# Patient Record
Sex: MALE | Race: WHITE | NOT HISPANIC OR LATINO | Employment: OTHER | URBAN - NONMETROPOLITAN AREA
[De-identification: names, ages, dates, MRNs, and addresses within clinical notes are randomized per-mention and may not be internally consistent; named-entity substitution may affect disease eponyms.]

---

## 2017-02-10 ENCOUNTER — TELEPHONE (OUTPATIENT)
Dept: CARDIOLOGY | Facility: CLINIC | Age: 67
End: 2017-02-10

## 2017-02-10 NOTE — TELEPHONE ENCOUNTER
Called pt to see if he had his Lipid panel done. [facility] [place] and [facility] work.

## 2018-08-30 ENCOUNTER — HOSPITAL ENCOUNTER (OUTPATIENT)
Facility: HOSPITAL | Age: 68
Setting detail: OBSERVATION
Discharge: HOME | End: 2018-08-31
Attending: FAMILY MEDICINE | Admitting: INTERNAL MEDICINE

## 2018-08-30 ENCOUNTER — APPOINTMENT (OUTPATIENT)
Dept: GENERAL RADIOLOGY | Facility: HOSPITAL | Age: 68
End: 2018-08-30

## 2018-08-30 DIAGNOSIS — R07.9 CHEST PAIN, UNSPECIFIED TYPE: Primary | ICD-10-CM

## 2018-08-30 LAB
ALBUMIN SERPL-MCNC: 3.9 G/DL (ref 3.4–4.8)
ALBUMIN/GLOB SERPL: 1.6 G/DL (ref 1.5–2.5)
ALP SERPL-CCNC: 117 U/L (ref 40–129)
ALT SERPL W P-5'-P-CCNC: 14 U/L (ref 10–44)
ANION GAP SERPL CALCULATED.3IONS-SCNC: 2.7 MMOL/L (ref 3.6–11.2)
AST SERPL-CCNC: 12 U/L (ref 10–34)
BASOPHILS # BLD AUTO: 0.07 10*3/MM3 (ref 0–0.3)
BASOPHILS NFR BLD AUTO: 0.8 % (ref 0–2)
BILIRUB SERPL-MCNC: 0.7 MG/DL (ref 0.2–1.8)
BUN BLD-MCNC: 16 MG/DL (ref 7–21)
BUN/CREAT SERPL: 16.3 (ref 7–25)
CALCIUM SPEC-SCNC: 8.8 MG/DL (ref 7.7–10)
CHLORIDE SERPL-SCNC: 109 MMOL/L (ref 99–112)
CK MB SERPL-CCNC: 0.97 NG/ML (ref 0–5)
CK MB SERPL-RTO: 1.5 % (ref 0–3)
CK SERPL-CCNC: 64 U/L (ref 24–204)
CO2 SERPL-SCNC: 25.3 MMOL/L (ref 24.3–31.9)
CREAT BLD-MCNC: 0.98 MG/DL (ref 0.43–1.29)
D DIMER PPP FEU-MCNC: <0.27 MCGFEU/ML (ref 0–0.5)
DEPRECATED RDW RBC AUTO: 42.4 FL (ref 37–54)
EOSINOPHIL # BLD AUTO: 0.34 10*3/MM3 (ref 0–0.7)
EOSINOPHIL NFR BLD AUTO: 3.8 % (ref 0–7)
ERYTHROCYTE [DISTWIDTH] IN BLOOD BY AUTOMATED COUNT: 13.7 % (ref 11.5–14.5)
GFR SERPL CREATININE-BSD FRML MDRD: 76 ML/MIN/1.73
GLOBULIN UR ELPH-MCNC: 2.4 GM/DL
GLUCOSE BLD-MCNC: 92 MG/DL (ref 70–110)
HCT VFR BLD AUTO: 43 % (ref 42–52)
HGB BLD-MCNC: 15 G/DL (ref 14–18)
HOLD SPECIMEN: NORMAL
HOLD SPECIMEN: NORMAL
IMM GRANULOCYTES # BLD: 0.03 10*3/MM3 (ref 0–0.03)
IMM GRANULOCYTES NFR BLD: 0.3 % (ref 0–0.5)
INR PPP: 1.14 (ref 0.9–1.1)
LYMPHOCYTES # BLD AUTO: 2.98 10*3/MM3 (ref 1–3)
LYMPHOCYTES NFR BLD AUTO: 33 % (ref 16–46)
MCH RBC QN AUTO: 29.9 PG (ref 27–33)
MCHC RBC AUTO-ENTMCNC: 34.9 G/DL (ref 33–37)
MCV RBC AUTO: 85.8 FL (ref 80–94)
MONOCYTES # BLD AUTO: 0.77 10*3/MM3 (ref 0.1–0.9)
MONOCYTES NFR BLD AUTO: 8.5 % (ref 0–12)
MYOGLOBIN SERPL-MCNC: 77 NG/ML (ref 0–109)
NEUTROPHILS # BLD AUTO: 4.84 10*3/MM3 (ref 1.4–6.5)
NEUTROPHILS NFR BLD AUTO: 53.6 % (ref 40–75)
OSMOLALITY SERPL CALC.SUM OF ELEC: 274.6 MOSM/KG (ref 273–305)
PLATELET # BLD AUTO: 262 10*3/MM3 (ref 130–400)
PMV BLD AUTO: 10 FL (ref 6–10)
POTASSIUM BLD-SCNC: 4 MMOL/L (ref 3.5–5.3)
PROT SERPL-MCNC: 6.3 G/DL (ref 6–8)
PROTHROMBIN TIME: 14.8 SECONDS (ref 11–15.4)
RBC # BLD AUTO: 5.01 10*6/MM3 (ref 4.7–6.1)
SODIUM BLD-SCNC: 137 MMOL/L (ref 135–153)
TROPONIN I SERPL-MCNC: <0.006 NG/ML
TROPONIN I SERPL-MCNC: <0.006 NG/ML
WBC NRBC COR # BLD: 9.03 10*3/MM3 (ref 4.5–12.5)
WHOLE BLOOD HOLD SPECIMEN: NORMAL
WHOLE BLOOD HOLD SPECIMEN: NORMAL

## 2018-08-30 PROCEDURE — 71045 X-RAY EXAM CHEST 1 VIEW: CPT | Performed by: RADIOLOGY

## 2018-08-30 PROCEDURE — 99285 EMERGENCY DEPT VISIT HI MDM: CPT

## 2018-08-30 PROCEDURE — 25010000002 HEPARIN (PORCINE) PER 1000 UNITS: Performed by: INTERNAL MEDICINE

## 2018-08-30 PROCEDURE — G0378 HOSPITAL OBSERVATION PER HR: HCPCS

## 2018-08-30 PROCEDURE — 99220 PR INITIAL OBSERVATION CARE/DAY 70 MINUTES: CPT | Performed by: INTERNAL MEDICINE

## 2018-08-30 PROCEDURE — 93005 ELECTROCARDIOGRAM TRACING: CPT | Performed by: PHYSICIAN ASSISTANT

## 2018-08-30 PROCEDURE — 71045 X-RAY EXAM CHEST 1 VIEW: CPT

## 2018-08-30 PROCEDURE — 80053 COMPREHEN METABOLIC PANEL: CPT | Performed by: PHYSICIAN ASSISTANT

## 2018-08-30 PROCEDURE — 93010 ELECTROCARDIOGRAM REPORT: CPT | Performed by: INTERNAL MEDICINE

## 2018-08-30 PROCEDURE — 82550 ASSAY OF CK (CPK): CPT | Performed by: INTERNAL MEDICINE

## 2018-08-30 PROCEDURE — 85025 COMPLETE CBC W/AUTO DIFF WBC: CPT | Performed by: PHYSICIAN ASSISTANT

## 2018-08-30 PROCEDURE — 83874 ASSAY OF MYOGLOBIN: CPT | Performed by: INTERNAL MEDICINE

## 2018-08-30 PROCEDURE — 84484 ASSAY OF TROPONIN QUANT: CPT | Performed by: PHYSICIAN ASSISTANT

## 2018-08-30 PROCEDURE — 96372 THER/PROPH/DIAG INJ SC/IM: CPT

## 2018-08-30 PROCEDURE — 84484 ASSAY OF TROPONIN QUANT: CPT | Performed by: INTERNAL MEDICINE

## 2018-08-30 PROCEDURE — 82553 CREATINE MB FRACTION: CPT | Performed by: INTERNAL MEDICINE

## 2018-08-30 PROCEDURE — 85379 FIBRIN DEGRADATION QUANT: CPT | Performed by: PHYSICIAN ASSISTANT

## 2018-08-30 PROCEDURE — 85610 PROTHROMBIN TIME: CPT | Performed by: PHYSICIAN ASSISTANT

## 2018-08-30 RX ORDER — ASPIRIN 81 MG/1
324 TABLET, CHEWABLE ORAL ONCE
Status: COMPLETED | OUTPATIENT
Start: 2018-08-30 | End: 2018-08-30

## 2018-08-30 RX ORDER — SODIUM CHLORIDE 0.9 % (FLUSH) 0.9 %
10 SYRINGE (ML) INJECTION AS NEEDED
Status: DISCONTINUED | OUTPATIENT
Start: 2018-08-30 | End: 2018-08-31 | Stop reason: HOSPADM

## 2018-08-30 RX ORDER — SODIUM CHLORIDE 0.9 % (FLUSH) 0.9 %
1-10 SYRINGE (ML) INJECTION AS NEEDED
Status: DISCONTINUED | OUTPATIENT
Start: 2018-08-30 | End: 2018-08-31 | Stop reason: HOSPADM

## 2018-08-30 RX ORDER — PANTOPRAZOLE SODIUM 40 MG/1
40 TABLET, DELAYED RELEASE ORAL
Status: DISCONTINUED | OUTPATIENT
Start: 2018-08-31 | End: 2018-08-31 | Stop reason: HOSPADM

## 2018-08-30 RX ORDER — ATORVASTATIN CALCIUM 20 MG/1
20 TABLET, FILM COATED ORAL NIGHTLY
Status: DISCONTINUED | OUTPATIENT
Start: 2018-08-31 | End: 2018-08-31 | Stop reason: HOSPADM

## 2018-08-30 RX ORDER — NITROGLYCERIN 0.4 MG/1
0.4 TABLET SUBLINGUAL
Status: DISCONTINUED | OUTPATIENT
Start: 2018-08-30 | End: 2018-08-31 | Stop reason: HOSPADM

## 2018-08-30 RX ORDER — HEPARIN SODIUM 5000 [USP'U]/ML
5000 INJECTION, SOLUTION INTRAVENOUS; SUBCUTANEOUS EVERY 12 HOURS SCHEDULED
Status: DISCONTINUED | OUTPATIENT
Start: 2018-08-30 | End: 2018-08-31 | Stop reason: HOSPADM

## 2018-08-30 RX ORDER — ASPIRIN 81 MG/1
81 TABLET ORAL DAILY
Status: DISCONTINUED | OUTPATIENT
Start: 2018-08-31 | End: 2018-08-31 | Stop reason: HOSPADM

## 2018-08-30 RX ORDER — MORPHINE SULFATE 15 MG/1
15 TABLET, FILM COATED, EXTENDED RELEASE ORAL EVERY 8 HOURS SCHEDULED
Status: DISCONTINUED | OUTPATIENT
Start: 2018-08-31 | End: 2018-08-31 | Stop reason: HOSPADM

## 2018-08-30 RX ORDER — IPRATROPIUM BROMIDE AND ALBUTEROL SULFATE 2.5; .5 MG/3ML; MG/3ML
3 SOLUTION RESPIRATORY (INHALATION) EVERY 6 HOURS PRN
Status: DISCONTINUED | OUTPATIENT
Start: 2018-08-30 | End: 2018-08-31 | Stop reason: HOSPADM

## 2018-08-30 RX ADMIN — ASPIRIN 324 MG: 81 TABLET, CHEWABLE ORAL at 18:14

## 2018-08-30 RX ADMIN — NITROGLYCERIN 1 INCH: 20 OINTMENT TOPICAL at 18:15

## 2018-08-30 RX ADMIN — HEPARIN SODIUM 5000 UNITS: 5000 INJECTION, SOLUTION INTRAVENOUS; SUBCUTANEOUS at 23:49

## 2018-08-31 ENCOUNTER — APPOINTMENT (OUTPATIENT)
Dept: CARDIOLOGY | Facility: HOSPITAL | Age: 68
End: 2018-08-31

## 2018-08-31 ENCOUNTER — APPOINTMENT (OUTPATIENT)
Dept: NUCLEAR MEDICINE | Facility: HOSPITAL | Age: 68
End: 2018-08-31

## 2018-08-31 VITALS
WEIGHT: 195.2 LBS | BODY MASS INDEX: 27.33 KG/M2 | TEMPERATURE: 98.1 F | HEIGHT: 71 IN | DIASTOLIC BLOOD PRESSURE: 75 MMHG | HEART RATE: 50 BPM | OXYGEN SATURATION: 99 % | RESPIRATION RATE: 18 BRPM | SYSTOLIC BLOOD PRESSURE: 136 MMHG

## 2018-08-31 LAB
6-ACETYL MORPHINE: NEGATIVE
ALBUMIN SERPL-MCNC: 3.3 G/DL (ref 3.4–4.8)
ALBUMIN/GLOB SERPL: 1.5 G/DL (ref 1.5–2.5)
ALP SERPL-CCNC: 102 U/L (ref 40–129)
ALT SERPL W P-5'-P-CCNC: 10 U/L (ref 10–44)
AMPHET+METHAMPHET UR QL: NEGATIVE
ANION GAP SERPL CALCULATED.3IONS-SCNC: 0.7 MMOL/L (ref 3.6–11.2)
AST SERPL-CCNC: 12 U/L (ref 10–34)
BARBITURATES UR QL SCN: NEGATIVE
BASOPHILS # BLD AUTO: 0.05 10*3/MM3 (ref 0–0.3)
BASOPHILS NFR BLD AUTO: 0.7 % (ref 0–2)
BENZODIAZ UR QL SCN: NEGATIVE
BH CV ECHO MEAS - % IVS THICK: 47.2 %
BH CV ECHO MEAS - % LVPW THICK: 50.9 %
BH CV ECHO MEAS - ACS: 2 CM
BH CV ECHO MEAS - AO MAX PG: 7.3 MMHG
BH CV ECHO MEAS - AO MEAN PG: 5 MMHG
BH CV ECHO MEAS - AO ROOT AREA (BSA CORRECTED): 1.6
BH CV ECHO MEAS - AO ROOT AREA: 9.1 CM^2
BH CV ECHO MEAS - AO ROOT DIAM: 3.4 CM
BH CV ECHO MEAS - AO V2 MAX: 134.7 CM/SEC
BH CV ECHO MEAS - AO V2 MEAN: 104.7 CM/SEC
BH CV ECHO MEAS - AO V2 VTI: 28.4 CM
BH CV ECHO MEAS - BSA(HAYCOCK): 2.1 M^2
BH CV ECHO MEAS - BSA: 2.1 M^2
BH CV ECHO MEAS - BZI_BMI: 27.2 KILOGRAMS/M^2
BH CV ECHO MEAS - BZI_METRIC_HEIGHT: 180.3 CM
BH CV ECHO MEAS - BZI_METRIC_WEIGHT: 88.5 KG
BH CV ECHO MEAS - EDV(CUBED): 133.1 ML
BH CV ECHO MEAS - EDV(MOD-SP4): 72 ML
BH CV ECHO MEAS - EDV(TEICH): 124.2 ML
BH CV ECHO MEAS - EF(CUBED): 64.4 %
BH CV ECHO MEAS - EF(MOD-SP4): 68.1 %
BH CV ECHO MEAS - EF(TEICH): 55.6 %
BH CV ECHO MEAS - ESV(CUBED): 47.5 ML
BH CV ECHO MEAS - ESV(MOD-SP4): 23 ML
BH CV ECHO MEAS - ESV(TEICH): 55.2 ML
BH CV ECHO MEAS - FS: 29.1 %
BH CV ECHO MEAS - IVS/LVPW: 0.85
BH CV ECHO MEAS - IVSD: 0.86 CM
BH CV ECHO MEAS - IVSS: 1.3 CM
BH CV ECHO MEAS - LA DIMENSION: 4 CM
BH CV ECHO MEAS - LA/AO: 1.2
BH CV ECHO MEAS - LV DIASTOLIC VOL/BSA (35-75): 34.5 ML/M^2
BH CV ECHO MEAS - LV MASS(C)D: 171.4 GRAMS
BH CV ECHO MEAS - LV MASS(C)DI: 82.1 GRAMS/M^2
BH CV ECHO MEAS - LV MASS(C)S: 179.1 GRAMS
BH CV ECHO MEAS - LV MASS(C)SI: 85.9 GRAMS/M^2
BH CV ECHO MEAS - LV SYSTOLIC VOL/BSA (12-30): 11 ML/M^2
BH CV ECHO MEAS - LVIDD: 5.1 CM
BH CV ECHO MEAS - LVIDS: 3.6 CM
BH CV ECHO MEAS - LVLD AP4: 7.2 CM
BH CV ECHO MEAS - LVLS AP4: 6.5 CM
BH CV ECHO MEAS - LVOT AREA (M): 3.5 CM^2
BH CV ECHO MEAS - LVOT AREA: 3.6 CM^2
BH CV ECHO MEAS - LVOT DIAM: 2.1 CM
BH CV ECHO MEAS - LVPWD: 1 CM
BH CV ECHO MEAS - LVPWS: 1.5 CM
BH CV ECHO MEAS - MV A MAX VEL: 88.8 CM/SEC
BH CV ECHO MEAS - MV E MAX VEL: 51.8 CM/SEC
BH CV ECHO MEAS - MV E/A: 0.58
BH CV ECHO MEAS - RAP SYSTOLE: 10 MMHG
BH CV ECHO MEAS - RVDD: 1.8 CM
BH CV ECHO MEAS - RVSP: 35.8 MMHG
BH CV ECHO MEAS - SI(AO): 124.5 ML/M^2
BH CV ECHO MEAS - SI(CUBED): 41.1 ML/M^2
BH CV ECHO MEAS - SI(MOD-SP4): 23.5 ML/M^2
BH CV ECHO MEAS - SI(TEICH): 33.1 ML/M^2
BH CV ECHO MEAS - SV(AO): 259.7 ML
BH CV ECHO MEAS - SV(CUBED): 85.7 ML
BH CV ECHO MEAS - SV(MOD-SP4): 49 ML
BH CV ECHO MEAS - SV(TEICH): 69 ML
BH CV ECHO MEAS - TR MAX VEL: 253.8 CM/SEC
BH CV NUCLEAR PRIOR STUDY: 3
BH CV STRESS BP STAGE 1: NORMAL
BH CV STRESS BP STAGE 2: NORMAL
BH CV STRESS COMMENTS STAGE 1: NORMAL
BH CV STRESS COMMENTS STAGE 2: NORMAL
BH CV STRESS DOSE REGADENOSON STAGE 1: 0.4
BH CV STRESS DURATION MIN STAGE 1: 0
BH CV STRESS DURATION MIN STAGE 2: 4
BH CV STRESS DURATION SEC STAGE 1: 10
BH CV STRESS DURATION SEC STAGE 2: 0
BH CV STRESS HR STAGE 1: 85
BH CV STRESS HR STAGE 2: 90
BH CV STRESS PROTOCOL 1: NORMAL
BH CV STRESS RECOVERY BP: NORMAL MMHG
BH CV STRESS RECOVERY HR: 71 BPM
BH CV STRESS STAGE 1: 1
BH CV STRESS STAGE 2: 2
BILIRUB SERPL-MCNC: 0.6 MG/DL (ref 0.2–1.8)
BUN BLD-MCNC: 16 MG/DL (ref 7–21)
BUN/CREAT SERPL: 16.3 (ref 7–25)
BUPRENORPHINE SERPL-MCNC: NEGATIVE NG/ML
CALCIUM SPEC-SCNC: 8.4 MG/DL (ref 7.7–10)
CANNABINOIDS SERPL QL: NEGATIVE
CHLORIDE SERPL-SCNC: 111 MMOL/L (ref 99–112)
CHOLEST SERPL-MCNC: 98 MG/DL (ref 0–200)
CK MB SERPL-CCNC: 1.26 NG/ML (ref 0–5)
CK MB SERPL-CCNC: 1.57 NG/ML (ref 0–5)
CK MB SERPL-RTO: 2.1 % (ref 0–3)
CK MB SERPL-RTO: 2.2 % (ref 0–3)
CK SERPL-CCNC: 60 U/L (ref 24–204)
CK SERPL-CCNC: 70 U/L (ref 24–204)
CO2 SERPL-SCNC: 26.3 MMOL/L (ref 24.3–31.9)
COCAINE UR QL: NEGATIVE
CREAT BLD-MCNC: 0.98 MG/DL (ref 0.43–1.29)
DEPRECATED RDW RBC AUTO: 43.5 FL (ref 37–54)
EOSINOPHIL # BLD AUTO: 0.35 10*3/MM3 (ref 0–0.7)
EOSINOPHIL NFR BLD AUTO: 4.7 % (ref 0–7)
ERYTHROCYTE [DISTWIDTH] IN BLOOD BY AUTOMATED COUNT: 13.8 % (ref 11.5–14.5)
GFR SERPL CREATININE-BSD FRML MDRD: 76 ML/MIN/1.73
GLOBULIN UR ELPH-MCNC: 2.2 GM/DL
GLUCOSE BLD-MCNC: 99 MG/DL (ref 70–110)
HBA1C MFR BLD: 5.5 % (ref 4.5–5.7)
HCT VFR BLD AUTO: 40.9 % (ref 42–52)
HDLC SERPL-MCNC: 30 MG/DL (ref 60–100)
HGB BLD-MCNC: 14 G/DL (ref 14–18)
IMM GRANULOCYTES # BLD: 0.04 10*3/MM3 (ref 0–0.03)
IMM GRANULOCYTES NFR BLD: 0.5 % (ref 0–0.5)
LDLC SERPL CALC-MCNC: 48 MG/DL (ref 0–100)
LDLC/HDLC SERPL: 1.59 {RATIO}
LV EF 2D ECHO EST: 65 %
LV EF NUC BP: 70 %
LYMPHOCYTES # BLD AUTO: 2.28 10*3/MM3 (ref 1–3)
LYMPHOCYTES NFR BLD AUTO: 30.5 % (ref 16–46)
MAXIMAL PREDICTED HEART RATE: 153 BPM
MAXIMAL PREDICTED HEART RATE: 153 BPM
MCH RBC QN AUTO: 29.8 PG (ref 27–33)
MCHC RBC AUTO-ENTMCNC: 34.2 G/DL (ref 33–37)
MCV RBC AUTO: 87 FL (ref 80–94)
METHADONE UR QL SCN: NEGATIVE
MONOCYTES # BLD AUTO: 0.77 10*3/MM3 (ref 0.1–0.9)
MONOCYTES NFR BLD AUTO: 10.3 % (ref 0–12)
MYOGLOBIN SERPL-MCNC: 69 NG/ML (ref 0–109)
MYOGLOBIN SERPL-MCNC: 79 NG/ML (ref 0–109)
NEUTROPHILS # BLD AUTO: 3.99 10*3/MM3 (ref 1.4–6.5)
NEUTROPHILS NFR BLD AUTO: 53.3 % (ref 40–75)
OPIATES UR QL: POSITIVE
OSMOLALITY SERPL CALC.SUM OF ELEC: 276.9 MOSM/KG (ref 273–305)
OXYCODONE UR QL SCN: NEGATIVE
PCP UR QL SCN: NEGATIVE
PERCENT MAX PREDICTED HR: 58.82 %
PLATELET # BLD AUTO: 236 10*3/MM3 (ref 130–400)
PMV BLD AUTO: 9.9 FL (ref 6–10)
POTASSIUM BLD-SCNC: 3.9 MMOL/L (ref 3.5–5.3)
PROT SERPL-MCNC: 5.5 G/DL (ref 6–8)
RBC # BLD AUTO: 4.7 10*6/MM3 (ref 4.7–6.1)
SODIUM BLD-SCNC: 138 MMOL/L (ref 135–153)
STRESS BASELINE BP: NORMAL MMHG
STRESS BASELINE HR: 51 BPM
STRESS PERCENT HR: 69 %
STRESS POST PEAK BP: NORMAL MMHG
STRESS POST PEAK HR: 90 BPM
STRESS TARGET HR: 130 BPM
STRESS TARGET HR: 130 BPM
TRIGL SERPL-MCNC: 101 MG/DL (ref 0–150)
TROPONIN I SERPL-MCNC: <0.006 NG/ML
TROPONIN I SERPL-MCNC: <0.006 NG/ML
VLDLC SERPL-MCNC: 20.2 MG/DL
WBC NRBC COR # BLD: 7.48 10*3/MM3 (ref 4.5–12.5)

## 2018-08-31 PROCEDURE — 80307 DRUG TEST PRSMV CHEM ANLYZR: CPT | Performed by: INTERNAL MEDICINE

## 2018-08-31 PROCEDURE — 83874 ASSAY OF MYOGLOBIN: CPT | Performed by: INTERNAL MEDICINE

## 2018-08-31 PROCEDURE — 83036 HEMOGLOBIN GLYCOSYLATED A1C: CPT | Performed by: INTERNAL MEDICINE

## 2018-08-31 PROCEDURE — A9500 TC99M SESTAMIBI: HCPCS | Performed by: INTERNAL MEDICINE

## 2018-08-31 PROCEDURE — 80053 COMPREHEN METABOLIC PANEL: CPT | Performed by: INTERNAL MEDICINE

## 2018-08-31 PROCEDURE — 84484 ASSAY OF TROPONIN QUANT: CPT | Performed by: INTERNAL MEDICINE

## 2018-08-31 PROCEDURE — 94799 UNLISTED PULMONARY SVC/PX: CPT

## 2018-08-31 PROCEDURE — 25010000002 REGADENOSON 0.4 MG/5ML SOLUTION: Performed by: INTERNAL MEDICINE

## 2018-08-31 PROCEDURE — G0378 HOSPITAL OBSERVATION PER HR: HCPCS

## 2018-08-31 PROCEDURE — 0 TECHNETIUM SESTAMIBI: Performed by: INTERNAL MEDICINE

## 2018-08-31 PROCEDURE — 93017 CV STRESS TEST TRACING ONLY: CPT

## 2018-08-31 PROCEDURE — 94640 AIRWAY INHALATION TREATMENT: CPT

## 2018-08-31 PROCEDURE — 80061 LIPID PANEL: CPT | Performed by: INTERNAL MEDICINE

## 2018-08-31 PROCEDURE — 82550 ASSAY OF CK (CPK): CPT | Performed by: INTERNAL MEDICINE

## 2018-08-31 PROCEDURE — 85025 COMPLETE CBC W/AUTO DIFF WBC: CPT | Performed by: PHYSICIAN ASSISTANT

## 2018-08-31 PROCEDURE — 93018 CV STRESS TEST I&R ONLY: CPT | Performed by: INTERNAL MEDICINE

## 2018-08-31 PROCEDURE — 93306 TTE W/DOPPLER COMPLETE: CPT | Performed by: INTERNAL MEDICINE

## 2018-08-31 PROCEDURE — 82553 CREATINE MB FRACTION: CPT | Performed by: INTERNAL MEDICINE

## 2018-08-31 PROCEDURE — 78452 HT MUSCLE IMAGE SPECT MULT: CPT

## 2018-08-31 PROCEDURE — 99217 PR OBSERVATION CARE DISCHARGE MANAGEMENT: CPT | Performed by: INTERNAL MEDICINE

## 2018-08-31 PROCEDURE — 93306 TTE W/DOPPLER COMPLETE: CPT

## 2018-08-31 PROCEDURE — 78452 HT MUSCLE IMAGE SPECT MULT: CPT | Performed by: INTERNAL MEDICINE

## 2018-08-31 RX ORDER — CLOBETASOL PROPIONATE 0.5 MG/G
1 CREAM TOPICAL 2 TIMES DAILY PRN
Status: ON HOLD | COMMUNITY
End: 2022-11-30

## 2018-08-31 RX ORDER — ETODOLAC 200 MG/1
200 CAPSULE ORAL 3 TIMES DAILY
COMMUNITY
End: 2022-04-11 | Stop reason: ALTCHOICE

## 2018-08-31 RX ORDER — ASPIRIN 81 MG/1
162 TABLET ORAL DAILY
COMMUNITY
End: 2018-08-31

## 2018-08-31 RX ORDER — METOPROLOL TARTRATE 50 MG/1
25 TABLET, FILM COATED ORAL 2 TIMES DAILY
COMMUNITY
End: 2018-08-31

## 2018-08-31 RX ORDER — ASPIRIN 81 MG/1
81 TABLET ORAL DAILY
Qty: 30 TABLET | Refills: 0 | Status: ON HOLD
Start: 2018-09-01 | End: 2022-11-30

## 2018-08-31 RX ORDER — LANOLIN ALCOHOL/MO/W.PET/CERES
1000 CREAM (GRAM) TOPICAL DAILY
COMMUNITY
End: 2022-04-11 | Stop reason: ALTCHOICE

## 2018-08-31 RX ORDER — ALBUTEROL SULFATE 90 UG/1
2 AEROSOL, METERED RESPIRATORY (INHALATION) EVERY 4 HOURS PRN
COMMUNITY
End: 2023-01-10 | Stop reason: SDUPTHER

## 2018-08-31 RX ORDER — BUDESONIDE AND FORMOTEROL FUMARATE DIHYDRATE 160; 4.5 UG/1; UG/1
2 AEROSOL RESPIRATORY (INHALATION)
Status: ON HOLD | COMMUNITY
End: 2022-11-30

## 2018-08-31 RX ORDER — RANITIDINE 300 MG/1
300 TABLET ORAL DAILY
COMMUNITY
End: 2022-04-11

## 2018-08-31 RX ORDER — CETIRIZINE HYDROCHLORIDE 10 MG/1
10 TABLET ORAL DAILY
Status: ON HOLD | COMMUNITY
End: 2022-11-30

## 2018-08-31 RX ORDER — SILDENAFIL 100 MG/1
100 TABLET, FILM COATED ORAL DAILY PRN
COMMUNITY
End: 2018-08-31

## 2018-08-31 RX ORDER — BETAMETHASONE DIPROPIONATE 0.05 %
1 OINTMENT (GRAM) TOPICAL DAILY
Status: ON HOLD | COMMUNITY
End: 2022-11-30

## 2018-08-31 RX ADMIN — METOPROLOL TARTRATE 12.5 MG: 25 TABLET, FILM COATED ORAL at 00:50

## 2018-08-31 RX ADMIN — MORPHINE SULFATE 15 MG: 15 TABLET, EXTENDED RELEASE ORAL at 00:49

## 2018-08-31 RX ADMIN — TECHNETIUM TC 99M SESTAMIBI 1 DOSE: 1 INJECTION INTRAVENOUS at 09:45

## 2018-08-31 RX ADMIN — IPRATROPIUM BROMIDE AND ALBUTEROL SULFATE 3 ML: .5; 3 SOLUTION RESPIRATORY (INHALATION) at 06:26

## 2018-08-31 RX ADMIN — ATORVASTATIN CALCIUM 20 MG: 20 TABLET, FILM COATED ORAL at 00:49

## 2018-08-31 RX ADMIN — MORPHINE SULFATE 15 MG: 15 TABLET, EXTENDED RELEASE ORAL at 05:12

## 2018-08-31 RX ADMIN — REGADENOSON 0.4 MG: 0.08 INJECTION, SOLUTION INTRAVENOUS at 09:45

## 2018-08-31 RX ADMIN — IPRATROPIUM BROMIDE AND ALBUTEROL SULFATE 3 ML: .5; 3 SOLUTION RESPIRATORY (INHALATION) at 02:58

## 2018-08-31 RX ADMIN — PANTOPRAZOLE SODIUM 40 MG: 40 TABLET, DELAYED RELEASE ORAL at 05:12

## 2018-09-04 ENCOUNTER — OFFICE VISIT (OUTPATIENT)
Dept: PULMONOLOGY | Facility: CLINIC | Age: 68
End: 2018-09-04

## 2018-09-04 VITALS
HEIGHT: 71 IN | TEMPERATURE: 97.5 F | WEIGHT: 192 LBS | SYSTOLIC BLOOD PRESSURE: 126 MMHG | DIASTOLIC BLOOD PRESSURE: 79 MMHG | HEART RATE: 57 BPM | BODY MASS INDEX: 26.88 KG/M2 | OXYGEN SATURATION: 95 %

## 2018-09-04 DIAGNOSIS — J41.0 SIMPLE CHRONIC BRONCHITIS (HCC): ICD-10-CM

## 2018-09-04 DIAGNOSIS — F51.5 DREAM ANXIETY DISORDER: ICD-10-CM

## 2018-09-04 DIAGNOSIS — G47.33 OSA (OBSTRUCTIVE SLEEP APNEA): Primary | ICD-10-CM

## 2018-09-04 DIAGNOSIS — F51.5 BAD DREAMS: ICD-10-CM

## 2018-09-04 PROCEDURE — 99204 OFFICE O/P NEW MOD 45 MIN: CPT | Performed by: INTERNAL MEDICINE
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
## 2018-09-05 ENCOUNTER — OFFICE VISIT (OUTPATIENT)
Dept: CARDIOLOGY | Facility: CLINIC | Age: 68
End: 2018-09-05

## 2018-09-05 VITALS
SYSTOLIC BLOOD PRESSURE: 127 MMHG | BODY MASS INDEX: 27.02 KG/M2 | DIASTOLIC BLOOD PRESSURE: 67 MMHG | HEART RATE: 50 BPM | RESPIRATION RATE: 16 BRPM | HEIGHT: 71 IN | OXYGEN SATURATION: 94 % | WEIGHT: 193 LBS

## 2018-09-05 DIAGNOSIS — I10 ESSENTIAL HYPERTENSION: ICD-10-CM

## 2018-09-05 DIAGNOSIS — I73.9 PVD (PERIPHERAL VASCULAR DISEASE) (HCC): ICD-10-CM

## 2018-09-05 DIAGNOSIS — R00.1 SINUS BRADYCARDIA: ICD-10-CM

## 2018-09-05 DIAGNOSIS — I25.10 CHRONIC CORONARY ARTERY DISEASE: ICD-10-CM

## 2018-09-05 DIAGNOSIS — E78.5 DYSLIPIDEMIA: ICD-10-CM

## 2018-09-05 DIAGNOSIS — Z72.0 TOBACCO ABUSE: ICD-10-CM

## 2018-09-05 DIAGNOSIS — R07.2 PRECORDIAL CHEST PAIN: Primary | ICD-10-CM

## 2018-09-05 PROCEDURE — 99214 OFFICE O/P EST MOD 30 MIN: CPT | Performed by: INTERNAL MEDICINE

## 2018-09-05 RX ORDER — ISOSORBIDE MONONITRATE 30 MG/1
30 TABLET, EXTENDED RELEASE ORAL DAILY
Qty: 30 TABLET | Refills: 6 | Status: SHIPPED | OUTPATIENT
Start: 2018-09-05 | End: 2018-10-23 | Stop reason: SDUPTHER

## 2018-09-05 NOTE — PROGRESS NOTES
Crispin Etienne MD  Bryson Baker  1950      Patient Active Problem List   Diagnosis   • Hypertension, controlled.   • Dyslipidemia, on Lipitor   • Pulmonary emphysema (CMS/HCC)   • Hypercholesterolemia   • Chronic coronary artery disease   • PVD (peripheral vascular disease) (CMS/HCC)   • Precordial chest pain   • Chest pain   • Tobacco abuse       Dear Crispin Etienne MD:    Subjective     Bryson Baker is a 68 y.o. male with the above medical problems who is here today for follow-up.  The patient was previously seen 2016 but it was lost to follow-up since that time.  He was recently admitted to the hospital for an episode of chest pain and was referred back to the clinic for further evaluation.  He underwent stress echo which showed no evidence of ischemia or previous infarct and inferior wall.  Systolic cardiac showed normal ejection fraction and wall motion abnormalities.  Currently the patient he is also presenting with bilateral lower extremity pain with ambulation concerning for claudication.  He has a known history of peripheral vascular disease.      Current Outpatient Prescriptions:   •  albuterol (PROVENTIL HFA;VENTOLIN HFA) 108 (90 Base) MCG/ACT inhaler, Inhale 2 puffs Every 4 (Four) Hours As Needed for Wheezing., Disp: , Rfl:   •  aspirin 81 MG EC tablet, Take 1 tablet by mouth Daily., Disp: 30 tablet, Rfl: 0  •  atorvastatin (LIPITOR) 20 MG tablet, Take 10 mg by mouth Daily., Disp: , Rfl:   •  betamethasone dipropionate (DIPROLENE) 0.05 % ointment, Apply 1 application topically to the appropriate area as directed Daily. Prior to [facility] Admission, Patient was on: Applies to legs for rash, Disp: , Rfl:   •  bisacodyl (DULCOLAX) 5 MG EC tablet, Take 10 mg by mouth 2 (Two) Times a Day., Disp: , Rfl:   •  budesonide-formoterol (SYMBICORT) 160-4.5 MCG/ACT inhaler, Inhale 2 puffs 2 (Two) Times a Day., Disp: , Rfl:   •  carbidopa-levodopa CR (SINEMET CR)  MG per CR tablet, Take 1  tablet by mouth Daily., Disp: , Rfl:   •  cetirizine (zyrTEC) 10 MG tablet, Take 10 mg by mouth Daily., Disp: , Rfl:   •  clobetasol (TEMOVATE) 0.05 % cream, Apply 1 application topically to the appropriate area as directed 2 (Two) Times a Day As Needed (rash). Prior to [facility] Admission, Patient was on: applies to legs, Disp: , Rfl:   •  etodolac (LODINE) 200 MG capsule, Take 200 mg by mouth 3 (Three) Times a Day., Disp: , Rfl:   •  metoprolol tartrate (LOPRESSOR) 25 MG tablet, Take ½ tablet by mouth Every 12 (Twelve) Hours., Disp: 30 tablet, Rfl: 0  •  morphine (MSIR) 15 MG tablet, Take 15 mg by mouth 3 (Three) Times a Day., Disp: , Rfl:   •  nitroglycerin (NITROSTAT) 0.4 MG SL tablet, Place 0.4 mg under the tongue Every 5 (Five) Minutes As Needed., Disp: , Rfl:   •  omeprazole (priLOSEC) 20 MG capsule, Take 20 mg by mouth 2 (Two) Times a Day As Needed., Disp: , Rfl:   •  raNITIdine (ZANTAC) 300 MG tablet, Take 300 mg by mouth Daily., Disp: , Rfl:   •  tamsulosin (FLOMAX) 0.4 MG capsule 24 hr capsule, Take 1 capsule by mouth Every Night., Disp: , Rfl:   •  vitamin B-12 (CYANOCOBALAMIN) 1000 MCG tablet, Take 1,000 mcg by mouth Daily., Disp: , Rfl:   •  isosorbide mononitrate (IMDUR) 30 MG 24 hr tablet, Take 1 tablet by mouth Daily., Disp: 30 tablet, Rfl: 6  •  lisinopril (PRINIVIL,ZESTRIL) 40 MG tablet, Take 20 mg by mouth Daily., Disp: , Rfl:     The following portions of the patient's history were reviewed and updated as appropriate: allergies, current medications, past family history, past medical history, past social history, past surgical history and problem list.    Review of Systems   Constitution: Negative for chills, diaphoresis and fever.   HENT: Negative for congestion, ear pain and sore throat.    Cardiovascular: Positive for chest pain and claudication. Negative for leg swelling, orthopnea, palpitations, paroxysmal nocturnal dyspnea and syncope.   Respiratory: Positive for shortness of breath. Negative  "for cough and hemoptysis.    Endocrine: Negative for cold intolerance and heat intolerance.   Hematologic/Lymphatic: Does not bruise/bleed easily.   Skin: Negative for rash.   Musculoskeletal: Negative for arthritis, joint pain, joint swelling, myalgias and stiffness.   Gastrointestinal: Negative for abdominal pain, constipation, diarrhea, hematemesis, hematochezia, melena, nausea and vomiting.   Genitourinary: Negative for dysuria and hematuria.   Neurological: Negative for dizziness, focal weakness and numbness.       Objective   Blood pressure 127/67, pulse 50, resp. rate 16, height 180.3 cm (70.98\"), weight 87.5 kg (193 lb), SpO2 94 %.    Physical Exam   Constitutional: He is oriented to person, place, and time. He appears well-developed and well-nourished. He is cooperative.   WM sitting comfortably on chair.   HENT:   Head: Normocephalic and atraumatic.   Nose: No mucosal edema or nasal deformity.   Mouth/Throat: Uvula is midline and oropharynx is clear and moist.   Eyes: Pupils are equal, round, and reactive to light. EOM are normal.   Neck: Normal range of motion. Neck supple. No JVD present. No tracheal deviation present. No thyromegaly present.   Cardiovascular: Normal rate, regular rhythm and normal heart sounds.  Exam reveals no gallop.    No murmur heard.  Pulses:       Posterior tibial pulses are 1+ on the left side.   Pulmonary/Chest: Effort normal and breath sounds normal. No respiratory distress. He has no wheezes. He has no rales. He exhibits no tenderness.   Abdominal: Soft. Bowel sounds are normal. He exhibits no mass. There is tenderness. There is no guarding.   Musculoskeletal: Normal range of motion. He exhibits no edema.   Lymphadenopathy:     He has no cervical adenopathy.   Neurological: He is alert and oriented to person, place, and time.   Skin: Skin is warm and dry. No rash noted. No erythema.   Psychiatric: He has a normal mood and affect. His behavior is normal. "       Procedures    Assessment/Plan     1.  Chest pain: Patient with a history of coronary artery disease with nonobstructive CAD on cardiac catheterization done by Dr. Ga in 2014.  He underwent stress test and echocardiogram which showed no evidence of ischemia and a normal ejection fraction and wall motion abnormality.  This point will start the patient on isosorbide mononitrate and monitor for improvement.    2.  Coronary artery disease: Patient with a history of coronary artery disease with a 50% lesion on the PDA found on cardiac catheterization done by Dr. Ga.  He is currently on aspirin, statin, beta blocker, ACE inhibitor and nitroglycerin.  This point will start isosorbide mononitrate and monitor for improvement as above.    3.  Hypertension: Patient with history of hypertension which is currently well controlled on current regimen.  At this point will continue.    4.  Peripheral vascular disease: Patient with history of peripheral vascular disease.  To the patient he has continued to present with pain and bilateral lower extremities and ambulation.  He previously underwent ABIs which showed no evidence of significant obstruction.  Since this was 2 years ago we will repeat at this point.    5.  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  He remains completely asymptomatic from this.  His metoprolol dose was recently decreased.  At this point we'll continue current regimen and monitor.    6.  Dyslipidemia: Patient with history of dyslipidemia with a lipid panel showing adequate control.  At this point would continue current regimen.    7.  Tobacco abuse: Patient with an extensive history of tobacco abuse and continues to smoke in spite of multiple counseling sessions and tobacco cessation.  I once again reminded him of the risks of continued tobacco use in the importance of tobacco cessation.       Diagnosis Plan   1. Precordial chest pain     2. Chronic coronary artery  disease     3. Essential hypertension     4. PVD (peripheral vascular disease) (CMS/HCC)  US Ankle / Brachial Indices Extremity Limited   5. Sinus bradycardia, likey secondary to metoprolol, asympotmatic.     6. Dyslipidemia, on Lipitor     7. Tobacco abuse          Return in about 4 weeks (around 10/3/2018).    I appreciate the opportunity to participate in this patient's cardiovascular care.    Best Regards    Cory Bettencourt

## 2018-09-07 ENCOUNTER — HOSPITAL ENCOUNTER (EMERGENCY)
Facility: HOSPITAL | Age: 68
Discharge: HOME | End: 2018-09-07
Attending: EMERGENCY MEDICINE | Admitting: EMERGENCY MEDICINE

## 2018-09-07 VITALS
SYSTOLIC BLOOD PRESSURE: 126 MMHG | RESPIRATION RATE: 18 BRPM | HEIGHT: 71 IN | TEMPERATURE: 97.6 F | HEART RATE: 58 BPM | BODY MASS INDEX: 27.02 KG/M2 | DIASTOLIC BLOOD PRESSURE: 70 MMHG | WEIGHT: 193 LBS | OXYGEN SATURATION: 98 %

## 2018-09-07 DIAGNOSIS — E86.0 DEHYDRATION: Primary | ICD-10-CM

## 2018-09-07 LAB
ALBUMIN SERPL-MCNC: 3.6 G/DL (ref 3.4–4.8)
ALBUMIN/GLOB SERPL: 1.7 G/DL (ref 1.5–2.5)
ALP SERPL-CCNC: 96 U/L (ref 40–129)
ALT SERPL W P-5'-P-CCNC: 17 U/L (ref 10–44)
ANION GAP SERPL CALCULATED.3IONS-SCNC: 1.3 MMOL/L (ref 3.6–11.2)
AST SERPL-CCNC: 15 U/L (ref 10–34)
BASOPHILS # BLD AUTO: 0.04 10*3/MM3 (ref 0–0.3)
BASOPHILS NFR BLD AUTO: 0.6 % (ref 0–2)
BILIRUB SERPL-MCNC: 0.8 MG/DL (ref 0.2–1.8)
BILIRUB UR QL STRIP: NEGATIVE
BNP SERPL-MCNC: 43 PG/ML (ref 0–100)
BUN BLD-MCNC: 15 MG/DL (ref 7–21)
BUN/CREAT SERPL: 16.5 (ref 7–25)
CALCIUM SPEC-SCNC: 8.4 MG/DL (ref 7.7–10)
CHLORIDE SERPL-SCNC: 116 MMOL/L (ref 99–112)
CLARITY UR: CLEAR
CO2 SERPL-SCNC: 24.7 MMOL/L (ref 24.3–31.9)
COLOR UR: YELLOW
CREAT BLD-MCNC: 0.91 MG/DL (ref 0.43–1.29)
DEPRECATED RDW RBC AUTO: 43.6 FL (ref 37–54)
EOSINOPHIL # BLD AUTO: 0.4 10*3/MM3 (ref 0–0.7)
EOSINOPHIL NFR BLD AUTO: 5.7 % (ref 0–7)
ERYTHROCYTE [DISTWIDTH] IN BLOOD BY AUTOMATED COUNT: 14.1 % (ref 11.5–14.5)
GFR SERPL CREATININE-BSD FRML MDRD: 83 ML/MIN/1.73
GLOBULIN UR ELPH-MCNC: 2.1 GM/DL
GLUCOSE BLD-MCNC: 93 MG/DL (ref 70–110)
GLUCOSE UR STRIP-MCNC: NEGATIVE MG/DL
HCT VFR BLD AUTO: 38.7 % (ref 42–52)
HGB BLD-MCNC: 13 G/DL (ref 14–18)
HGB UR QL STRIP.AUTO: NEGATIVE
IMM GRANULOCYTES # BLD: 0.01 10*3/MM3 (ref 0–0.03)
IMM GRANULOCYTES NFR BLD: 0.1 % (ref 0–0.5)
KETONES UR QL STRIP: NEGATIVE
LEUKOCYTE ESTERASE UR QL STRIP.AUTO: NEGATIVE
LYMPHOCYTES # BLD AUTO: 2.47 10*3/MM3 (ref 1–3)
LYMPHOCYTES NFR BLD AUTO: 35.4 % (ref 16–46)
MCH RBC QN AUTO: 28.7 PG (ref 27–33)
MCHC RBC AUTO-ENTMCNC: 33.6 G/DL (ref 33–37)
MCV RBC AUTO: 85.4 FL (ref 80–94)
MONOCYTES # BLD AUTO: 0.78 10*3/MM3 (ref 0.1–0.9)
MONOCYTES NFR BLD AUTO: 11.2 % (ref 0–12)
NEUTROPHILS # BLD AUTO: 3.28 10*3/MM3 (ref 1.4–6.5)
NEUTROPHILS NFR BLD AUTO: 47 % (ref 40–75)
NITRITE UR QL STRIP: NEGATIVE
OSMOLALITY SERPL CALC.SUM OF ELEC: 283.6 MOSM/KG (ref 273–305)
PH UR STRIP.AUTO: <=5 [PH] (ref 5–8)
PLATELET # BLD AUTO: 241 10*3/MM3 (ref 130–400)
PMV BLD AUTO: 10.3 FL (ref 6–10)
POTASSIUM BLD-SCNC: 4 MMOL/L (ref 3.5–5.3)
PROT SERPL-MCNC: 5.7 G/DL (ref 6–8)
PROT UR QL STRIP: NEGATIVE
RBC # BLD AUTO: 4.53 10*6/MM3 (ref 4.7–6.1)
SODIUM BLD-SCNC: 142 MMOL/L (ref 135–153)
SP GR UR STRIP: 1.02 (ref 1–1.03)
TROPONIN I SERPL-MCNC: <0.006 NG/ML
UROBILINOGEN UR QL STRIP: NORMAL
WBC NRBC COR # BLD: 6.98 10*3/MM3 (ref 4.5–12.5)

## 2018-09-07 PROCEDURE — 93005 ELECTROCARDIOGRAM TRACING: CPT | Performed by: EMERGENCY MEDICINE

## 2018-09-07 PROCEDURE — 84484 ASSAY OF TROPONIN QUANT: CPT | Performed by: EMERGENCY MEDICINE

## 2018-09-07 PROCEDURE — 36415 COLL VENOUS BLD VENIPUNCTURE: CPT

## 2018-09-07 PROCEDURE — 93010 ELECTROCARDIOGRAM REPORT: CPT | Performed by: INTERNAL MEDICINE

## 2018-09-07 PROCEDURE — 85025 COMPLETE CBC W/AUTO DIFF WBC: CPT | Performed by: EMERGENCY MEDICINE

## 2018-09-07 PROCEDURE — 99283 EMERGENCY DEPT VISIT LOW MDM: CPT

## 2018-09-07 PROCEDURE — 83880 ASSAY OF NATRIURETIC PEPTIDE: CPT | Performed by: EMERGENCY MEDICINE

## 2018-09-07 PROCEDURE — 96360 HYDRATION IV INFUSION INIT: CPT

## 2018-09-07 PROCEDURE — 80053 COMPREHEN METABOLIC PANEL: CPT | Performed by: EMERGENCY MEDICINE

## 2018-09-07 PROCEDURE — 81003 URINALYSIS AUTO W/O SCOPE: CPT | Performed by: EMERGENCY MEDICINE

## 2018-09-07 RX ADMIN — SODIUM CHLORIDE 1000 ML: 9 INJECTION, SOLUTION INTRAVENOUS at 19:22

## 2018-09-08 NOTE — ED PROVIDER NOTES
Subjective   Patient presents to ER because his blood pressure has been low.        Weakness - Generalized   Severity:  Mild  Onset quality:  Gradual  Progression:  Worsening  Chronicity:  New  Context: dehydration    Relieved by:  Nothing  Worsened by:  Nothing  Ineffective treatments:  None tried      Review of Systems   Constitutional: Positive for activity change and fatigue.   HENT: Negative.    Eyes: Negative.    Respiratory: Negative.    Cardiovascular: Negative.    Gastrointestinal: Negative.    Endocrine: Negative.    Genitourinary: Negative.    Musculoskeletal: Negative.    Skin: Negative.    Allergic/Immunologic: Negative.    Hematological: Negative.    Psychiatric/Behavioral: Negative.        Past Medical History:   Diagnosis Date   • Anxiety    • Arthritis    • Asthma    • Baritosis (CMS/HCC)    • COPD (chronic obstructive pulmonary disease) (CMS/HCC)    • Coronary artery disease    • Gastritis    • GERD (gastroesophageal reflux disease)    • High cholesterol    • Hypertension    • Sleep apnea        No Known Allergies    Past Surgical History:   Procedure Laterality Date   • CARDIAC CATHETERIZATION     • COLONOSCOPY W/ POLYPECTOMY     • FINGER SURGERY         Family History   Problem Relation Age of Onset   • Cancer Mother    • Heart disease Mother    • Cancer Father    • Cancer Sister    • Cancer Brother        Social History     Social History   • Marital status: Single     Social History Main Topics   • Smoking status: Current Some Day Smoker     Packs/day: 0.50     Types: Cigarettes   • Smokeless tobacco: Never Used   • Alcohol use No   • Drug use: No   • Sexual activity: Defer     Other Topics Concern   • Not on file           Objective   Physical Exam    Procedures           ED Course                  MDM      Final diagnoses:   Dehydration            Jose Manuel Cooper MD  09/07/18 0310

## 2018-09-08 NOTE — ED NOTES
2131 PT IS AAO X4 PT HAS NO SIGNS OF DISTRESS BREATHING IS EQUAL AND UNLABORED SKIN PWD      Daisy Dalton, RN  09/07/18 2136

## 2018-09-19 ENCOUNTER — HOSPITAL ENCOUNTER (OUTPATIENT)
Dept: ULTRASOUND IMAGING | Facility: HOSPITAL | Age: 68
Discharge: HOME | End: 2018-09-19
Attending: INTERNAL MEDICINE | Admitting: INTERNAL MEDICINE

## 2018-09-19 DIAGNOSIS — I73.9 PVD (PERIPHERAL VASCULAR DISEASE) (HCC): ICD-10-CM

## 2018-09-19 PROCEDURE — 93922 UPR/L XTREMITY ART 2 LEVELS: CPT | Performed by: RADIOLOGY

## 2018-09-19 PROCEDURE — 93922 UPR/L XTREMITY ART 2 LEVELS: CPT

## 2018-09-25 ENCOUNTER — TELEPHONE (OUTPATIENT)
Dept: CARDIOLOGY | Facility: CLINIC | Age: 68
End: 2018-09-25

## 2018-10-03 ENCOUNTER — OFFICE VISIT (OUTPATIENT)
Dept: PULMONOLOGY | Facility: CLINIC | Age: 68
End: 2018-10-03

## 2018-10-03 VITALS
WEIGHT: 199 LBS | HEIGHT: 71 IN | HEART RATE: 58 BPM | TEMPERATURE: 98.2 F | SYSTOLIC BLOOD PRESSURE: 125 MMHG | BODY MASS INDEX: 27.86 KG/M2 | DIASTOLIC BLOOD PRESSURE: 64 MMHG | OXYGEN SATURATION: 96 %

## 2018-10-03 DIAGNOSIS — G47.52 REM SLEEP BEHAVIOR DISORDER: ICD-10-CM

## 2018-10-03 DIAGNOSIS — Z87.891 FORMER SMOKER: ICD-10-CM

## 2018-10-03 DIAGNOSIS — G47.33 OBSTRUCTIVE SLEEP APNEA: Primary | ICD-10-CM

## 2018-10-03 DIAGNOSIS — J43.2 CENTRILOBULAR EMPHYSEMA (HCC): ICD-10-CM

## 2018-10-03 PROCEDURE — 99214 OFFICE O/P EST MOD 30 MIN: CPT | Performed by: INTERNAL MEDICINE

## 2018-10-03 NOTE — PROGRESS NOTES
Interval history since last visit: wakes with SOB/ panic attacks.     Recent hospitalizations: none     Investigations (imaging, PFT's, labs, sleep study, record requests, etc.) none     Have you had the Influenza Vaccine? no   Would you like to receive this Vaccine today? no    Have you had the Pneumonia Vaccine?  no  Would you like to receive this Vaccine today? no    Subjective    Bryson Baker presents for the following Sleep Apnea  .    History of Present Illness   Mr. Baker is a 68 year old gentleman with a past medical history of COPD currently on albuterol, and Symbicort, seasonal allergies currently on cetirizine, gastric esophageal reflux disease currently on omeprazole and Zantac and CAD/hypertension currently on aspirin and statin isosorbide mononitrate, lisinopril, metoprolol.  He follows pulmonary clinic for the management of  COPD and sleep apnea.      Currently he denies any symptom of shortness of breath, wheezing, cough, fever, night sweats, weight loss, chest pain, racing of the heart or spells of dizziness.  He is scheduled for sleep study in next couple of days.  He still complains of nightmares.    Review of Systems   Constitutional: Negative for appetite change, chills, diaphoresis and unexpected weight change.   HENT: Negative for sore throat, trouble swallowing and voice change.    Eyes: Negative for visual disturbance.   Respiratory: Negative for apnea, cough, choking, shortness of breath and wheezing.    Cardiovascular: Negative for chest pain, palpitations and leg swelling.   Gastrointestinal: Negative for abdominal pain, constipation, diarrhea, nausea and vomiting.   Endocrine: Negative for cold intolerance, heat intolerance, polydipsia, polyphagia and polyuria.   Genitourinary: Negative for difficulty urinating and dysuria.   Musculoskeletal: Negative for gait problem.   Skin: Negative for rash and wound.   Neurological: Negative for syncope and light-headedness.   Hematological:  Negative for adenopathy.   Psychiatric/Behavioral: Negative for agitation, behavioral problems and confusion.   All other systems reviewed and are negative.      Active Problems:  Problem List Items Addressed This Visit     None      Visit Diagnoses     Obstructive sleep apnea    -  Primary    Centrilobular emphysema (CMS/HCC)        REM sleep behavior disorder        Former smoker              Past Medical History:  Past Medical History:   Diagnosis Date   • Anxiety    • Arthritis    • Asthma    • Baritosis (CMS/HCC)    • COPD (chronic obstructive pulmonary disease) (CMS/HCC)    • Coronary artery disease    • Gastritis    • GERD (gastroesophageal reflux disease)    • High cholesterol    • Hypertension    • Sleep apnea        Family History:  Family History   Problem Relation Age of Onset   • Cancer Mother    • Heart disease Mother    • Cancer Father    • Cancer Sister    • Cancer Brother        Social History:  Social History   Substance Use Topics   • Smoking status: Former Smoker     Packs/day: 0.50     Types: Cigarettes   • Smokeless tobacco: Never Used   • Alcohol use No       Current Medications:  Current Outpatient Prescriptions   Medication Sig Dispense Refill   • albuterol (PROVENTIL HFA;VENTOLIN HFA) 108 (90 Base) MCG/ACT inhaler Inhale 2 puffs Every 4 (Four) Hours As Needed for Wheezing.     • aspirin 81 MG EC tablet Take 1 tablet by mouth Daily. 30 tablet 0   • atorvastatin (LIPITOR) 20 MG tablet Take 10 mg by mouth Daily.     • betamethasone dipropionate (DIPROLENE) 0.05 % ointment Apply 1 application topically to the appropriate area as directed Daily. Prior to [facility] Admission, Patient was on: Applies to legs for rash     • bisacodyl (DULCOLAX) 5 MG EC tablet Take 10 mg by mouth 2 (Two) Times a Day.     • budesonide-formoterol (SYMBICORT) 160-4.5 MCG/ACT inhaler Inhale 2 puffs 2 (Two) Times a Day.     • carbidopa-levodopa CR (SINEMET CR)  MG per CR tablet Take 1 tablet by mouth Daily.     •  "cetirizine (zyrTEC) 10 MG tablet Take 10 mg by mouth Daily.     • clobetasol (TEMOVATE) 0.05 % cream Apply 1 application topically to the appropriate area as directed 2 (Two) Times a Day As Needed (rash). Prior to [facility] Admission, Patient was on: applies to legs     • etodolac (LODINE) 200 MG capsule Take 200 mg by mouth 3 (Three) Times a Day.     • isosorbide mononitrate (IMDUR) 30 MG 24 hr tablet Take 1 tablet by mouth Daily. 30 tablet 6   • lisinopril (PRINIVIL,ZESTRIL) 40 MG tablet Take 20 mg by mouth Daily.     • metoprolol tartrate (LOPRESSOR) 25 MG tablet Take ½ tablet by mouth Every 12 (Twelve) Hours. 30 tablet 0   • morphine (MSIR) 15 MG tablet Take 15 mg by mouth 3 (Three) Times a Day.     • nitroglycerin (NITROSTAT) 0.4 MG SL tablet Place 0.4 mg under the tongue Every 5 (Five) Minutes As Needed.     • omeprazole (priLOSEC) 20 MG capsule Take 20 mg by mouth 2 (Two) Times a Day As Needed.     • raNITIdine (ZANTAC) 300 MG tablet Take 300 mg by mouth Daily.     • tamsulosin (FLOMAX) 0.4 MG capsule 24 hr capsule Take 1 capsule by mouth Every Night.     • vitamin B-12 (CYANOCOBALAMIN) 1000 MCG tablet Take 1,000 mcg by mouth Daily.       No current facility-administered medications for this visit.        Allergies:  No Known Allergies    Vitals:  /64   Pulse 58   Temp 98.2 °F (36.8 °C)   Ht 180.3 cm (71\")   Wt 90.3 kg (199 lb)   SpO2 96%   BMI 27.75 kg/m²     Imaging:    Imaging Results (most recent)     None          Pulmonary Functions Testing Results:    No results found for: FEV1, FVC, DPB4PAB, TLC, DLCO    Results for orders placed or performed during the hospital encounter of 09/07/18   Comprehensive Metabolic Panel   Result Value Ref Range    Glucose 93 70 - 110 mg/dL    BUN 15 7 - 21 mg/dL    Creatinine 0.91 0.43 - 1.29 mg/dL    Sodium 142 135 - 153 mmol/L    Potassium 4.0 3.5 - 5.3 mmol/L    Chloride 116 (H) 99 - 112 mmol/L    CO2 24.7 24.3 - 31.9 mmol/L    Calcium 8.4 7.7 - 10.0 mg/dL    " Total Protein 5.7 (L) 6.0 - 8.0 g/dL    Albumin 3.60 3.40 - 4.80 g/dL    ALT (SGPT) 17 10 - 44 U/L    AST (SGOT) 15 10 - 34 U/L    Alkaline Phosphatase 96 40 - 129 U/L    Total Bilirubin 0.8 0.2 - 1.8 mg/dL    eGFR Non African Amer 83 >60 mL/min/1.73    Globulin 2.1 gm/dL    A/G Ratio 1.7 1.5 - 2.5 g/dL    BUN/Creatinine Ratio 16.5 7.0 - 25.0    Anion Gap 1.3 (L) 3.6 - 11.2 mmol/L   CBC Auto Differential   Result Value Ref Range    WBC 6.98 4.50 - 12.50 10*3/mm3    RBC 4.53 (L) 4.70 - 6.10 10*6/mm3    Hemoglobin 13.0 (L) 14.0 - 18.0 g/dL    Hematocrit 38.7 (L) 42.0 - 52.0 %    MCV 85.4 80.0 - 94.0 fL    MCH 28.7 27.0 - 33.0 pg    MCHC 33.6 33.0 - 37.0 g/dL    RDW 14.1 11.5 - 14.5 %    RDW-SD 43.6 37.0 - 54.0 fl    MPV 10.3 (H) 6.0 - 10.0 fL    Platelets 241 130 - 400 10*3/mm3    Neutrophil % 47.0 40.0 - 75.0 %    Lymphocyte % 35.4 16.0 - 46.0 %    Monocyte % 11.2 0.0 - 12.0 %    Eosinophil % 5.7 0.0 - 7.0 %    Basophil % 0.6 0.0 - 2.0 %    Immature Grans % 0.1 0.0 - 0.5 %    Neutrophils, Absolute 3.28 1.40 - 6.50 10*3/mm3    Lymphocytes, Absolute 2.47 1.00 - 3.00 10*3/mm3    Monocytes, Absolute 0.78 0.10 - 0.90 10*3/mm3    Eosinophils, Absolute 0.40 0.00 - 0.70 10*3/mm3    Basophils, Absolute 0.04 0.00 - 0.30 10*3/mm3    Immature Grans, Absolute 0.01 0.00 - 0.03 10*3/mm3   Urinalysis With Microscopic If Indicated (No Culture) - Urine, Clean Catch   Result Value Ref Range    Color, UA Yellow Yellow, Straw    Appearance, UA Clear Clear    pH, UA <=5.0 5.0 - 8.0    Specific Gravity, UA 1.025 1.005 - 1.030    Glucose, UA Negative Negative    Ketones, UA Negative Negative    Bilirubin, UA Negative Negative    Blood, UA Negative Negative    Protein, UA Negative Negative    Leuk Esterase, UA Negative Negative    Nitrite, UA Negative Negative    Urobilinogen, UA 1.0 E.U./dL 0.2 - 1.0 E.U./dL   BNP   Result Value Ref Range    BNP 43.0 0.0 - 100.0 pg/mL   Troponin   Result Value Ref Range    Troponin I <0.006 <=0.040 ng/mL    Osmolality, Calculated   Result Value Ref Range    Osmolality Calc 283.6 273.0 - 305.0 mOsm/kg     I have reviewed the labs and previous imaging.  Objective   Physical Exam   Constitutional: He is oriented to person, place, and time. He appears well-developed. No distress.   HENT:   Head: Normocephalic and atraumatic.   Eyes: Pupils are equal, round, and reactive to light. Conjunctivae and EOM are normal.   Neck: Normal range of motion. Neck supple. No thyromegaly present.   Cardiovascular: Normal rate, regular rhythm, normal heart sounds and intact distal pulses.    No murmur heard.  Pulmonary/Chest: Breath sounds normal. No respiratory distress.   Abdominal: Soft. Bowel sounds are normal.   Musculoskeletal: Normal range of motion. He exhibits no edema or deformity.   Neurological: He is alert and oriented to person, place, and time. No cranial nerve deficit. Coordination normal.   Skin: Skin is warm and dry. Capillary refill takes less than 2 seconds. No rash noted. He is not diaphoretic.   Psychiatric: He has a normal mood and affect. His behavior is normal.       Assessment/Plan       ICD-10-CM ICD-9-CM   1. Obstructive sleep apnea G47.33 327.23   2. Centrilobular emphysema (CMS/HCC) J43.2 492.8   3. REM sleep behavior disorder G47.52 327.42   4. Former smoker Z87.891 V15.82     Obstructive sleep apnea  [place] sleepiness score: 11  STOPBANG score: 5  Plan:  - Polysomnography: Follow up on the results  - Strongly recommend to avoid alcohol, sedatives and narcotics   - Strongly recommend weight loss program  - Based on polysomnography results, positive airway pressure treatment will be initiated.  - Patient was educated on positive airway pressure treatment.  As per CMS guidelines, more than 4 hours on 70% of observed nights is considered adherence. Patient was strongly encouraged to use CPAP as much as possible during sleep as more CPAP use is equal to more benefit. Use of heated humidification in positive  airway pressure treatment to improve the adherence to the device.  In case of claustrophobia, we will provide the patient cognitive behavioral therapy and desensitization. Oral appliances use will be discussed with the patient in case of mild to moderate sleep apnea or if the patient with severe disease fail positive airway pressure treatment.   The patient was extensively educated on the consequences of untreated obstructive sleep apnea namely cardiovascular/metabolic disorder, neurocognitive deficit, daytime sleepiness, motor vehicle accidents, depression, mood disorders and reduced quality of life.  At the end of conversation, the patient voices understanding of the disease process and treatment modality.  Patient also understands the risk of untreated obstructive sleep apnea and benefit benefits of the treatment.  Counseling time was greater than 10 minutes.    REM sleep behavior disorder  Patient is currently on levo dopa/carbidopa for parkinsonism.   Plan:  -Follow polysomnography results.  Based on the results, we may add melatonin to his medication list.  Melatonin provides a safer alternative to clonazepam.  - Strongly recommend to discontinue beta blocker if possible as it can worsen RBD.  - I strongly recommend to secure the patient's environment while sleeping by removing potentially dangerous objects from the bedroom, placing cushions around the bed and move the mattress to the floor for added protection against injuries.  - I strongly advised him to maintain a normal sleep schedule, avoid sleep deprivation and keep track of any sleepiness he may have.  Sleep deprivation, alcohol, certain medication can also increase REM behavior disorder and should be avoided.    COPD:  Plan:  -Continue with albuterol when necessary  - Continue with Symbicort 2 puffs twice daily    Former smoker, more than 30-pack-year smoking history quit less than 15 years  - He will need low-dose CT scan during his second  visit.      Patient's Body mass index is 27.75 kg/m². BMI is above normal parameters. Recommendations include: educational material and exercise counseling.    Patient is a former smoker.    Patient has already finished the course of pneumonia vaccination.      Patient will follow-up with us in next 3 months.

## 2018-10-23 ENCOUNTER — OFFICE VISIT (OUTPATIENT)
Dept: CARDIOLOGY | Facility: CLINIC | Age: 68
End: 2018-10-23

## 2018-10-23 VITALS
SYSTOLIC BLOOD PRESSURE: 117 MMHG | HEIGHT: 71 IN | WEIGHT: 202.8 LBS | BODY MASS INDEX: 28.39 KG/M2 | OXYGEN SATURATION: 93 % | HEART RATE: 69 BPM | DIASTOLIC BLOOD PRESSURE: 59 MMHG

## 2018-10-23 DIAGNOSIS — I10 ESSENTIAL HYPERTENSION: ICD-10-CM

## 2018-10-23 DIAGNOSIS — E78.5 DYSLIPIDEMIA: ICD-10-CM

## 2018-10-23 DIAGNOSIS — I25.10 ASCVD (ARTERIOSCLEROTIC CARDIOVASCULAR DISEASE): Primary | ICD-10-CM

## 2018-10-23 DIAGNOSIS — J43.9 PULMONARY EMPHYSEMA, UNSPECIFIED EMPHYSEMA TYPE (HCC): ICD-10-CM

## 2018-10-23 DIAGNOSIS — Z72.0 TOBACCO ABUSE: ICD-10-CM

## 2018-10-23 PROCEDURE — 99213 OFFICE O/P EST LOW 20 MIN: CPT | Performed by: PHYSICIAN ASSISTANT

## 2018-10-23 RX ORDER — CHLORAL HYDRATE 500 MG
CAPSULE ORAL
COMMUNITY
End: 2022-04-11 | Stop reason: ALTCHOICE

## 2018-10-23 RX ORDER — AMLODIPINE BESYLATE 5 MG/1
5 TABLET ORAL DAILY
COMMUNITY
End: 2022-04-11 | Stop reason: ALTCHOICE

## 2018-10-23 RX ORDER — POLYETHYLENE GLYCOL 3350 17 G/17G
17 POWDER, FOR SOLUTION ORAL DAILY
Status: ON HOLD | COMMUNITY
End: 2022-11-30

## 2018-10-23 RX ORDER — ISOSORBIDE MONONITRATE 60 MG/1
30 TABLET, EXTENDED RELEASE ORAL DAILY
Qty: 90 TABLET | Refills: 3 | Status: SHIPPED | OUTPATIENT
Start: 2018-10-23 | End: 2019-03-05 | Stop reason: SINTOL

## 2018-10-23 NOTE — PROGRESS NOTES
Ever Chiang MD  Bryson Baker  1950  10/23/2018    Patient Active Problem List   Diagnosis   • Hypertension, controlled.   • Dyslipidemia, on Lipitor   • Pulmonary emphysema (CMS/HCC)   • ASCVD with 50% stenosis in RCA in 2013   • PVD (peripheral vascular disease) (CMS/HCC)   • Precordial chest pain   • Chest pain   • Tobacco abuse       Dear Ever Chiang MD:    Subjective       History of Present Illness:    Chief Complaint   Patient presents with   • Follow-up   • Med Management     med list.    • Results     IFTIKHAR   • Chest Pain     some   • Palpitations       Bryson Baker is a pleasant 68 y.o. male with a past medical history significant for ASCVD with 50% stenosis seen in RCA from a left heart catheterization on August 5, 2013, essential hypertension, dyslipidemia, COPD, and tobacco abuse.  Patient comes in for routine cardiology follow-up.    Patient states that he normally has been seeing Dr. Bettencourt in the last visit Dr. Bettencourt started him on isosorbide mononitrate 30 mg by mouth daily and since initiating this medication he says his chest pains have significantly improved in severity and frequency.  Although he does state that he still does have some couple times a week that is somewhat related to exertion as described as a sharp pain in his left upper chest does not make him short of breath or diaphoretic and does not radiate anywhere.          No Known Allergies:      Current Outpatient Prescriptions:   •  albuterol (PROVENTIL HFA;VENTOLIN HFA) 108 (90 Base) MCG/ACT inhaler, Inhale 2 puffs Every 4 (Four) Hours As Needed for Wheezing., Disp: , Rfl:   •  amLODIPine (NORVASC) 5 MG tablet, Take 5 mg by mouth Daily., Disp: , Rfl:   •  aspirin 81 MG EC tablet, Take 1 tablet by mouth Daily., Disp: 30 tablet, Rfl: 0  •  atorvastatin (LIPITOR) 20 MG tablet, Take 10 mg by mouth Daily., Disp: , Rfl:   •  betamethasone dipropionate (DIPROLENE) 0.05 % ointment, Apply 1 application topically to  the appropriate area as directed Daily. Prior to [facility] Admission, Patient was on: Applies to legs for rash, Disp: , Rfl:   •  budesonide-formoterol (SYMBICORT) 160-4.5 MCG/ACT inhaler, Inhale 2 puffs 2 (Two) Times a Day., Disp: , Rfl:   •  clobetasol (TEMOVATE) 0.05 % cream, Apply 1 application topically to the appropriate area as directed 2 (Two) Times a Day As Needed (rash). Prior to [facility] Admission, Patient was on: applies to legs, Disp: , Rfl:   •  etodolac (LODINE) 200 MG capsule, Take 200 mg by mouth 3 (Three) Times a Day., Disp: , Rfl:   •  metoprolol tartrate (LOPRESSOR) 25 MG tablet, Take ½ tablet by mouth Every 12 (Twelve) Hours. (Patient taking differently: Take 50 mg by mouth Every 12 (Twelve) Hours.), Disp: 30 tablet, Rfl: 0  •  morphine (MSIR) 15 MG tablet, Take 15 mg by mouth 3 (Three) Times a Day., Disp: , Rfl:   •  nitroglycerin (NITROSTAT) 0.4 MG SL tablet, Place 0.4 mg under the tongue Every 5 (Five) Minutes As Needed., Disp: , Rfl:   •  Omega-3 Fatty Acids (FISH OIL) 1000 MG capsule capsule, Take  by mouth Daily With Breakfast., Disp: , Rfl:   •  omeprazole (priLOSEC) 20 MG capsule, Take 20 mg by mouth 2 (Two) Times a Day As Needed., Disp: , Rfl:   •  polyethylene glycol (MIRALAX) packet, Take 17 g by mouth Daily., Disp: , Rfl:   •  raNITIdine (ZANTAC) 300 MG tablet, Take 300 mg by mouth Daily., Disp: , Rfl:   •  bisacodyl (DULCOLAX) 5 MG EC tablet, Take 10 mg by mouth 2 (Two) Times a Day., Disp: , Rfl:   •  carbidopa-levodopa CR (SINEMET CR)  MG per CR tablet, Take 1 tablet by mouth Daily., Disp: , Rfl:   •  cetirizine (zyrTEC) 10 MG tablet, Take 10 mg by mouth Daily., Disp: , Rfl:   •  isosorbide mononitrate (IMDUR) 60 MG 24 hr tablet, Take 0.5 tablets by mouth Daily., Disp: 90 tablet, Rfl: 3  •  lisinopril (PRINIVIL,ZESTRIL) 40 MG tablet, Take 20 mg by mouth Daily., Disp: , Rfl:   •  tamsulosin (FLOMAX) 0.4 MG capsule 24 hr capsule, Take 1 capsule by mouth Every Night., Disp: , Rfl:  "  •  vitamin B-12 (CYANOCOBALAMIN) 1000 MCG tablet, Take 1,000 mcg by mouth Daily., Disp: , Rfl:       The following portions of the patient's history were reviewed and updated as appropriate: allergies, current medications, past family history, past medical history, past social history, past surgical history and problem list.    Social History   Substance Use Topics   • Smoking status: Current Every Day Smoker     Packs/day: 1.00     Types: Cigarettes   • Smokeless tobacco: Never Used      Comment: had quit but started back today 10/23/18   • Alcohol use No       Review of Systems   Constitution: Negative for weakness and malaise/fatigue.   Eyes: Negative for visual disturbance and visual halos.   Cardiovascular: Positive for chest pain and palpitations. Negative for leg swelling and syncope.   Respiratory: Positive for shortness of breath. Negative for cough.    Hematologic/Lymphatic: Negative for bleeding problem. Does not bruise/bleed easily.   Neurological: Negative for dizziness.       Objective   Vitals:    10/23/18 1228   BP: 117/59   BP Location: Left arm   Patient Position: Sitting   Cuff Size: Adult   Pulse: 69   SpO2: 93%   Weight: 92 kg (202 lb 12.8 oz)   Height: 180.3 cm (71\")     Body mass index is 28.28 kg/m².        Physical Exam   Constitutional: He is oriented to person, place, and time. He appears well-developed and well-nourished. No distress.   Cardiovascular: Normal rate, regular rhythm and normal heart sounds.    No murmur heard.  Pulmonary/Chest: Effort normal. No respiratory distress. He has wheezes.   Musculoskeletal: He exhibits no edema.   Neurological: He is alert and oriented to person, place, and time.   Skin: He is not diaphoretic.       Lab Results   Component Value Date     09/07/2018    K 4.0 09/07/2018     (H) 09/07/2018    CO2 24.7 09/07/2018    BUN 15 09/07/2018    CREATININE 0.91 09/07/2018    GLUCOSE 93 09/07/2018    CALCIUM 8.4 09/07/2018    AST 15 09/07/2018    " ALT 17 09/07/2018    ALKPHOS 96 09/07/2018    LABIL2 1.6 05/05/2016     Lab Results   Component Value Date    CKTOTAL 70 08/31/2018     Lab Results   Component Value Date    WBC 6.98 09/07/2018    HGB 13.0 (L) 09/07/2018    HCT 38.7 (L) 09/07/2018     09/07/2018     Lab Results   Component Value Date    INR 1.14 (H) 08/30/2018     Lab Results   Component Value Date    MG 2.2 02/18/2014     Lab Results   Component Value Date    TSH 1.338 05/09/2016    CHLPL 139 02/19/2014    TRIG 101 08/31/2018    HDL 30 (L) 08/31/2018    LDL 48 08/31/2018      Lab Results   Component Value Date    BNP 43.0 09/07/2018       During this visit the following were done:  Labs Reviewed [x]    Labs Ordered []    Radiology Reports Reviewed []    Radiology Ordered []    PCP Records Reviewed []    Referring Provider Records Reviewed []    ER Records Reviewed []    Hospital Records Reviewed []    History Obtained From Family []    Radiology Images Reviewed []    Other Reviewed []    Records Requested []       Procedures      Assessment/Plan    Diagnosis Plan   1. ASCVD with 50% stenosis in RCA in 2013     2. Dyslipidemia, on Lipitor     3. Essential hypertension     4. Tobacco abuse     5. Pulmonary emphysema, unspecified emphysema type (CMS/HCC)                  Recommendations:  1. Continue on aspirin, Lipitor, metoprolol, lisinopril, amlodipine.   2. I reviewed patient's labs was sent to us patient with adequately controlled LDL on the current dose of Lipitor.  3. Patient also reported to me that he had been able to stop smoking couple months ago however since he started gaining weight he started back smoking again I strongly encouraged him to resist this urge has being slightly overweight is still healthier than smoking.  4. Since patient had good benefit with 30 mg I'll go ahead and increase 60 cc of this, the results of his chest pains if it does not make To consider left heart catheterization since he has known nonobstructive  ASCVD in 2013.  5. Follow-up in about one to 2 months    Patient's Body mass index is 28.28 kg/m². BMI is above normal parameters. Recommendations include: educational material.       Return in about 2 months (around 12/23/2018).    As always, I appreciate very much the opportunity to participate in the cardiovascular care of your patients.      With Best Regards,    AUDIE Abdalla disclaimer:  Much of this encounter note is an electronic transcription/translation of spoken language to printed text. The electronic translation of spoken language may permit erroneous, or at times, nonsensical words or phrases to be inadvertently transcribed; Although I have reviewed the note for such errors, some may still exist.

## 2018-10-29 ENCOUNTER — TELEPHONE (OUTPATIENT)
Dept: CARDIOLOGY | Facility: CLINIC | Age: 68
End: 2018-10-29

## 2018-10-29 NOTE — TELEPHONE ENCOUNTER
Pt called and stated that when he woke up this morning he didn't feel right and was having bad dreams. He checked his BP and it was 90/60. He stated after he got up and had some coffee he checked his BP and it was 118/70.

## 2018-10-29 NOTE — TELEPHONE ENCOUNTER
This is may be related to the higher dose of imdur. If he continue to have low blood pressure like that he should go back to his original dose of 30 mg.

## 2018-11-16 ENCOUNTER — TRANSCRIBE ORDERS (OUTPATIENT)
Dept: PULMONOLOGY | Facility: CLINIC | Age: 68
End: 2018-11-16

## 2018-11-16 DIAGNOSIS — G47.33 OSA (OBSTRUCTIVE SLEEP APNEA): Primary | ICD-10-CM

## 2018-11-26 ENCOUNTER — TELEPHONE (OUTPATIENT)
Dept: CARDIOLOGY | Facility: CLINIC | Age: 68
End: 2018-11-26

## 2018-11-26 NOTE — TELEPHONE ENCOUNTER
Pt called and stated that since he has started the Imdur that his BP gets really low. He stated that he doesn't take any of BP meds unless his BP is really high. It has been 3/4 days since he last took his BP meds. He stated that when he does take his BP meds that it will keep his bp so low that he has to eat slaty foods to get it brought back up and he will not have to take his BP meds again for a few days.     Pt has an apt next week with Danny on 12/4/18. I advised pt to make sure he keeps that apt. He expressed understanding.

## 2018-11-27 NOTE — TELEPHONE ENCOUNTER
Called pt and he stated he has been off his Imdur for a week now and still having the low BP issue. He wanted to know if they was anything that we can add him on to bring his BP up.     I also advised him to keep a log of his BP and bring it with his next week when he comes in for a follow up. He expressed understanding.

## 2018-11-27 NOTE — TELEPHONE ENCOUNTER
He is not taking any BP medicines? Metoprolol, norvasc, or lisinoprill? Also ask if he is taking morphine three times a day cause this causes low BP?

## 2018-11-28 NOTE — TELEPHONE ENCOUNTER
Spoke with Danny and advised him that Bryson does take his Morphine TID. Danny stated to have pt hold his BP meds since his BP is so low and they will discuss his medicine when he comes in the office on Tuesday 12/4/18 and to make sure that he know his morphine could be causing his BP to drop so low.     Advised pt and he expressed understanding.

## 2018-11-28 NOTE — TELEPHONE ENCOUNTER
Pt called me back. He stated it has been about a week and half since he has took any BP meds. He takes Amlodipine 5 mg QD, Lisinopril 20 mg QD, and Metoprolol 12.5 mg BID. Does he need to start back on all 3 of these?

## 2018-11-28 NOTE — TELEPHONE ENCOUNTER
If his BP has been staying in the 90s/60s then no. He should stay off of all of them. Also ask if he is taking any narcotics like morphine? These medications can lower blood pressure.

## 2018-12-04 ENCOUNTER — OFFICE VISIT (OUTPATIENT)
Dept: CARDIOLOGY | Facility: CLINIC | Age: 68
End: 2018-12-04

## 2018-12-04 VITALS
HEART RATE: 67 BPM | WEIGHT: 199 LBS | RESPIRATION RATE: 16 BRPM | DIASTOLIC BLOOD PRESSURE: 69 MMHG | SYSTOLIC BLOOD PRESSURE: 116 MMHG | HEIGHT: 71 IN | BODY MASS INDEX: 27.86 KG/M2

## 2018-12-04 DIAGNOSIS — I25.10 ASCVD (ARTERIOSCLEROTIC CARDIOVASCULAR DISEASE): Primary | ICD-10-CM

## 2018-12-04 DIAGNOSIS — K22.719 BARRETT'S ESOPHAGUS WITH DYSPLASIA: ICD-10-CM

## 2018-12-04 DIAGNOSIS — I10 ESSENTIAL HYPERTENSION: ICD-10-CM

## 2018-12-04 DIAGNOSIS — R07.9 CHEST PAIN, UNSPECIFIED TYPE: ICD-10-CM

## 2018-12-04 PROCEDURE — 99214 OFFICE O/P EST MOD 30 MIN: CPT | Performed by: PHYSICIAN ASSISTANT

## 2018-12-04 RX ORDER — RANOLAZINE 500 MG/1
500 TABLET, EXTENDED RELEASE ORAL 2 TIMES DAILY
Qty: 60 TABLET | Refills: 60 | Status: SHIPPED | OUTPATIENT
Start: 2018-12-04 | End: 2018-12-05 | Stop reason: SDUPTHER

## 2018-12-04 NOTE — PROGRESS NOTES
Ever Chiang MD  Bryson Baker  1950 12/04/2018    Patient Active Problem List   Diagnosis   • Hypertension, controlled.   • Dyslipidemia, on Lipitor   • Pulmonary emphysema (CMS/HCC)   • ASCVD with 50% stenosis in RCA in 2013   • PVD (peripheral vascular disease) (CMS/HCC)   • Precordial chest pain   • Chest pain   • Tobacco abuse   • Kumar's esophagus with dysplasia       Dear Ever Chiang MD:    Subjective       History of Present Illness:    Chief Complaint   Patient presents with   • Follow-up     3 mos   • Chest Pain     imdur drops bp   • Palpitations     skipping   • Sleep Apnea     recent c pap diag   • Shortness of Breath     routine activity       Bryson Baker is a pleasant 68 y.o. male with a past medical history significant for  ASCVD with 50% stenosis seen in RCA from a left heart catheterization on August 5, 2013, essential hypertension, dyslipidemia, COPD, and tobacco abuse.  Patient comes in for routine cardiology follow-up.     Patient states that he couldn't tolerate the 60 mg of imdur so he has stopped this medication but states he has been doing well since then. He states he stopped it due to low blood pressure. He does still admit to some chest pain that has been present chronically. He states that pain is sharp and is in his left shoulder blade and center of chest pain. He does state that its related to exertion where walking up hill can make him more short of breath and bring the pain on.       No Known Allergies:      Current Outpatient Medications:   •  albuterol (PROVENTIL HFA;VENTOLIN HFA) 108 (90 Base) MCG/ACT inhaler, Inhale 2 puffs Every 4 (Four) Hours As Needed for Wheezing., Disp: , Rfl:   •  aspirin 81 MG EC tablet, Take 1 tablet by mouth Daily., Disp: 30 tablet, Rfl: 0  •  atorvastatin (LIPITOR) 20 MG tablet, Take 20 mg by mouth Daily., Disp: , Rfl:   •  betamethasone dipropionate (DIPROLENE) 0.05 % ointment, Apply 1 application topically to the  appropriate area as directed Daily. Prior to [facility] Admission, Patient was on: Applies to legs for rash, Disp: , Rfl:   •  bisacodyl (DULCOLAX) 5 MG EC tablet, Take 10 mg by mouth 2 (Two) Times a Day., Disp: , Rfl:   •  budesonide-formoterol (SYMBICORT) 160-4.5 MCG/ACT inhaler, Inhale 2 puffs 2 (Two) Times a Day., Disp: , Rfl:   •  carbidopa-levodopa CR (SINEMET CR)  MG per CR tablet, Take 1 tablet by mouth As Needed., Disp: , Rfl:   •  clobetasol (TEMOVATE) 0.05 % cream, Apply 1 application topically to the appropriate area as directed 2 (Two) Times a Day As Needed (rash). Prior to [facility] Admission, Patient was on: applies to legs, Disp: , Rfl:   •  etodolac (LODINE) 200 MG capsule, Take 200 mg by mouth 3 (Three) Times a Day., Disp: , Rfl:   •  morphine (MSIR) 15 MG tablet, Take 15 mg by mouth 3 (Three) Times a Day., Disp: , Rfl:   •  nitroglycerin (NITROSTAT) 0.4 MG SL tablet, Place 0.4 mg under the tongue Every 5 (Five) Minutes As Needed., Disp: , Rfl:   •  Omega-3 Fatty Acids (FISH OIL) 1000 MG capsule capsule, Take  by mouth Daily With Breakfast., Disp: , Rfl:   •  omeprazole (priLOSEC) 20 MG capsule, Take 20 mg by mouth 2 (Two) Times a Day As Needed., Disp: , Rfl:   •  polyethylene glycol (MIRALAX) packet, Take 17 g by mouth Daily., Disp: , Rfl:   •  raNITIdine (ZANTAC) 300 MG tablet, Take 300 mg by mouth Daily., Disp: , Rfl:   •  tamsulosin (FLOMAX) 0.4 MG capsule 24 hr capsule, Take 1 capsule by mouth Every Night., Disp: , Rfl:   •  vitamin B-12 (CYANOCOBALAMIN) 1000 MCG tablet, Take 1,000 mcg by mouth Daily., Disp: , Rfl:   •  amLODIPine (NORVASC) 5 MG tablet, Take 5 mg by mouth Daily., Disp: , Rfl:   •  cetirizine (zyrTEC) 10 MG tablet, Take 10 mg by mouth Daily., Disp: , Rfl:   •  isosorbide mononitrate (IMDUR) 60 MG 24 hr tablet, Take 0.5 tablets by mouth Daily., Disp: 90 tablet, Rfl: 3  •  lisinopril (PRINIVIL,ZESTRIL) 40 MG tablet, Take 20 mg by mouth Daily., Disp: , Rfl:   •  metoprolol  "tartrate (LOPRESSOR) 25 MG tablet, Take ½ tablet by mouth Every 12 (Twelve) Hours. (Patient taking differently: Take 50 mg by mouth Every 12 (Twelve) Hours.), Disp: 30 tablet, Rfl: 0  •  ranolazine (RANEXA) 500 MG 12 hr tablet, Take 1 tablet by mouth 2 (Two) Times a Day., Disp: 60 tablet, Rfl: 60      The following portions of the patient's history were reviewed and updated as appropriate: allergies, current medications, past family history, past medical history, past social history, past surgical history and problem list.    Social History     Tobacco Use   • Smoking status: Current Every Day Smoker     Packs/day: 1.00     Types: Cigarettes   • Smokeless tobacco: Never Used   • Tobacco comment: had quit but started back today 10/23/18   Substance Use Topics   • Alcohol use: No   • Drug use: No       Review of Systems   Constitution: Negative for weakness and malaise/fatigue.   Cardiovascular: Positive for chest pain, dyspnea on exertion and palpitations. Negative for irregular heartbeat and leg swelling.   Respiratory: Positive for shortness of breath. Negative for cough.    Hematologic/Lymphatic: Negative for bleeding problem. Does not bruise/bleed easily.   Gastrointestinal: Negative for nausea and vomiting.       Objective   Vitals:    12/04/18 1458   BP: 116/69   Pulse: 67   Resp: 16   Weight: 90.3 kg (199 lb)   Height: 180.3 cm (71\")     Body mass index is 27.75 kg/m².        Physical Exam   Constitutional: He is oriented to person, place, and time. He appears well-developed and well-nourished. No distress.   HENT:   Head: Normocephalic and atraumatic.   Cardiovascular: Normal rate, regular rhythm, normal heart sounds and intact distal pulses.   Pulmonary/Chest: Effort normal and breath sounds normal. No respiratory distress.   Musculoskeletal: He exhibits no edema.   Neurological: He is alert and oriented to person, place, and time.   Skin: He is not diaphoretic.       Lab Results   Component Value Date    NA " 142 09/07/2018    K 4.0 09/07/2018     (H) 09/07/2018    CO2 24.7 09/07/2018    BUN 15 09/07/2018    CREATININE 0.91 09/07/2018    GLUCOSE 93 09/07/2018    CALCIUM 8.4 09/07/2018    AST 15 09/07/2018    ALT 17 09/07/2018    ALKPHOS 96 09/07/2018    LABIL2 1.6 05/05/2016     Lab Results   Component Value Date    CKTOTAL 70 08/31/2018     Lab Results   Component Value Date    WBC 6.98 09/07/2018    HGB 13.0 (L) 09/07/2018    HCT 38.7 (L) 09/07/2018     09/07/2018     Lab Results   Component Value Date    INR 1.14 (H) 08/30/2018     Lab Results   Component Value Date    MG 2.2 02/18/2014     Lab Results   Component Value Date    TSH 1.338 05/09/2016    CHLPL 139 02/19/2014    TRIG 101 08/31/2018    HDL 30 (L) 08/31/2018    LDL 48 08/31/2018      Lab Results   Component Value Date    BNP 43.0 09/07/2018       During this visit the following were done:  Labs Reviewed [x]    Labs Ordered []    Radiology Reports Reviewed [x]    Radiology Ordered []    PCP Records Reviewed []    Referring Provider Records Reviewed []    ER Records Reviewed []    Hospital Records Reviewed []    History Obtained From Family []    Radiology Images Reviewed []    Other Reviewed []    Records Requested []       Procedures      Assessment/Plan    Diagnosis Plan   1. ASCVD with 50% stenosis in RCA in 2013     2. Essential hypertension     3. Chest pain, unspecified type     4. Kumar's esophagus with dysplasia                  Recommendations:  1. Will start him on Ranexa to see if this helps his chest pains. If this does not help I again discussed with him the possiblity of a left heart catheterization. He expressed understanding.   2. Continue amlodipine, aspirin, Lipitor,  Lisinopril, metoprolol.   3. Follow up in 3 months.     Return in about 3 months (around 3/4/2019).    As always, I appreciate very much the opportunity to participate in the cardiovascular care of your patients.      With Best Regards,    Danny Oneal,  PA-C    Dragon disclaimer:  Much of this encounter note is an electronic transcription/translation of spoken language to printed text. The electronic translation of spoken language may permit erroneous, or at times, nonsensical words or phrases to be inadvertently transcribed; Although I have reviewed the note for such errors, some may still exist.

## 2018-12-05 ENCOUNTER — TELEPHONE (OUTPATIENT)
Dept: CARDIOLOGY | Facility: CLINIC | Age: 68
End: 2018-12-05

## 2018-12-05 RX ORDER — RANOLAZINE 500 MG/1
500 TABLET, EXTENDED RELEASE ORAL 2 TIMES DAILY
Qty: 60 TABLET | Refills: 3 | OUTPATIENT
Start: 2018-12-05 | End: 2018-12-05 | Stop reason: SDUPTHER

## 2018-12-05 RX ORDER — RANOLAZINE 500 MG/1
500 TABLET, EXTENDED RELEASE ORAL 2 TIMES DAILY
Qty: 60 TABLET | Refills: 60 | OUTPATIENT
Start: 2018-12-05 | End: 2018-12-05 | Stop reason: SDUPTHER

## 2018-12-05 RX ORDER — RANOLAZINE 500 MG/1
500 TABLET, EXTENDED RELEASE ORAL 2 TIMES DAILY
Qty: 60 TABLET | Refills: 3 | Status: SHIPPED | OUTPATIENT
Start: 2018-12-05 | End: 2018-12-06 | Stop reason: SDUPTHER

## 2018-12-05 NOTE — TELEPHONE ENCOUNTER
[facility]. He wanted to make sure we have the right fax number. The number he gave us is:    [phone]     Can someone call this in asap?     Thank you!

## 2018-12-06 RX ORDER — RANOLAZINE 500 MG/1
500 TABLET, EXTENDED RELEASE ORAL 2 TIMES DAILY
Qty: 180 TABLET | Refills: 1 | Status: SHIPPED | OUTPATIENT
Start: 2018-12-06

## 2019-01-02 ENCOUNTER — TELEPHONE (OUTPATIENT)
Dept: PULMONOLOGY | Facility: CLINIC | Age: 69
End: 2019-01-02

## 2019-01-02 ENCOUNTER — OFFICE VISIT (OUTPATIENT)
Dept: PULMONOLOGY | Facility: CLINIC | Age: 69
End: 2019-01-02

## 2019-01-02 VITALS
SYSTOLIC BLOOD PRESSURE: 100 MMHG | OXYGEN SATURATION: 95 % | TEMPERATURE: 98 F | HEIGHT: 61 IN | HEART RATE: 67 BPM | WEIGHT: 203 LBS | BODY MASS INDEX: 38.33 KG/M2 | DIASTOLIC BLOOD PRESSURE: 62 MMHG

## 2019-01-02 DIAGNOSIS — G47.33 OSA (OBSTRUCTIVE SLEEP APNEA): Primary | ICD-10-CM

## 2019-01-02 DIAGNOSIS — G47.52 REM BEHAVIORAL DISORDER: ICD-10-CM

## 2019-01-02 DIAGNOSIS — J43.2 CENTRILOBULAR EMPHYSEMA (HCC): ICD-10-CM

## 2019-01-02 DIAGNOSIS — Z12.2 ENCOUNTER FOR SCREENING FOR LUNG CANCER: ICD-10-CM

## 2019-01-02 DIAGNOSIS — Z87.891 FORMER SMOKER: ICD-10-CM

## 2019-01-02 PROCEDURE — 94664 DEMO&/EVAL PT USE INHALER: CPT | Performed by: INTERNAL MEDICINE

## 2019-01-02 PROCEDURE — 99214 OFFICE O/P EST MOD 30 MIN: CPT | Performed by: INTERNAL MEDICINE

## 2019-01-02 RX ORDER — CHOLECALCIFEROL (VITAMIN D3) 125 MCG
5 CAPSULE ORAL NIGHTLY
Qty: 30 TABLET | Refills: 11 | Status: SHIPPED | OUTPATIENT
Start: 2019-01-02 | End: 2022-04-11

## 2019-01-02 NOTE — PROGRESS NOTES
Interval history since last visit: Feels like he has swelling in stomach.    Recent hospitalizations: None    Investigations (imaging, PFT's, labs, sleep study, record requests, etc.) None    Have you had the Influenza Vaccine? yes    Would you like to receive this Vaccine today? no    Have you had the Pneumonia Vaccine?  yes   Would you like to receive this Vaccine today? no    Subjective    Bryson Baker presents for the following Sleep Apnea    History of Present Illness   Mr. Baker is a 68 year old gentleman with a past medical history of COPD currently on albuterol, and Symbicort, seasonal allergies currently on cetirizine, gastric esophageal reflux disease currently on omeprazole and Zantac and CAD/hypertension currently on aspirin and statin isosorbide mononitrate, lisinopril, metoprolol.  He follows pulmonary clinic for the management of  COPD and sleep apnea.       Currently he denies any symptom of shortness of breath, wheezing, cough, fever, night sweats, weight loss, chest pain, racing of the heart or spells of dizziness.  He still complains of nightmares.        Review of Systems   Constitutional: Negative for activity change, fatigue and unexpected weight change.   HENT: Negative for congestion, postnasal drip and rhinorrhea.    Eyes: Negative for discharge and itching.   Respiratory: Negative for apnea, cough, chest tightness, shortness of breath (On exertion) and wheezing.    Cardiovascular: Negative for chest pain and palpitations.   Gastrointestinal: Negative for abdominal distention and nausea.   Endocrine: Negative for cold intolerance and heat intolerance.   Genitourinary: Negative for difficulty urinating and dysuria.   Musculoskeletal: Negative for arthralgias and back pain.   Skin: Negative for color change and pallor.   Allergic/Immunologic: Negative for environmental allergies and food allergies.   Neurological: Negative for dizziness and headaches.   Hematological: Negative for  adenopathy. Does not bruise/bleed easily.   Psychiatric/Behavioral: Negative for agitation and confusion.       Active Problems:  Problem List Items Addressed This Visit     Pulmonary emphysema (CMS/HCC)    Relevant Medications    tiotropium bromide monohydrate (SPIRIVA RESPIMAT) 2.5 MCG/ACT aerosol solution inhaler      Other Visit Diagnoses     JONNIE (obstructive sleep apnea)    -  Primary    Relevant Orders    Ambulatory Referral to Sleep Medicine    REM behavioral disorder        Encounter for screening for lung cancer        Relevant Orders    CT Chest Low Dose Wo    Former smoker        Relevant Orders    CT Chest Low Dose Wo          Past Medical History:  Past Medical History:   Diagnosis Date   • Anxiety    • Arthritis    • Asthma    • Baritosis (CMS/HCC)    • COPD (chronic obstructive pulmonary disease) (CMS/HCC)    • Coronary artery disease    • Gastritis    • GERD (gastroesophageal reflux disease)    • High cholesterol    • Hypertension    • Sleep apnea    • Sleep apnea        Family History:  Family History   Problem Relation Age of Onset   • Cancer Mother    • Heart disease Mother    • Cancer Father    • Cancer Sister    • Cancer Brother        Social History:  Social History     Tobacco Use   • Smoking status: Current Every Day Smoker     Packs/day: 1.00     Types: Cigarettes   • Smokeless tobacco: Never Used   • Tobacco comment: had quit but started back today 10/23/18   Substance Use Topics   • Alcohol use: No       Current Medications:  Current Outpatient Medications   Medication Sig Dispense Refill   • albuterol (PROVENTIL HFA;VENTOLIN HFA) 108 (90 Base) MCG/ACT inhaler Inhale 2 puffs Every 4 (Four) Hours As Needed for Wheezing.     • amLODIPine (NORVASC) 5 MG tablet Take 5 mg by mouth Daily.     • aspirin 81 MG EC tablet Take 1 tablet by mouth Daily. 30 tablet 0   • atorvastatin (LIPITOR) 20 MG tablet Take 20 mg by mouth Daily.     • betamethasone dipropionate (DIPROLENE) 0.05 % ointment Apply 1  application topically to the appropriate area as directed Daily. Prior to [facility] Admission, Patient was on: Applies to legs for rash     • bisacodyl (DULCOLAX) 5 MG EC tablet Take 10 mg by mouth 2 (Two) Times a Day.     • budesonide-formoterol (SYMBICORT) 160-4.5 MCG/ACT inhaler Inhale 2 puffs 2 (Two) Times a Day.     • carbidopa-levodopa CR (SINEMET CR)  MG per CR tablet Take 1 tablet by mouth As Needed.     • cetirizine (zyrTEC) 10 MG tablet Take 10 mg by mouth Daily.     • clobetasol (TEMOVATE) 0.05 % cream Apply 1 application topically to the appropriate area as directed 2 (Two) Times a Day As Needed (rash). Prior to [facility] Admission, Patient was on: applies to legs     • etodolac (LODINE) 200 MG capsule Take 200 mg by mouth 3 (Three) Times a Day.     • isosorbide mononitrate (IMDUR) 60 MG 24 hr tablet Take 0.5 tablets by mouth Daily. 90 tablet 3   • lisinopril (PRINIVIL,ZESTRIL) 40 MG tablet Take 20 mg by mouth Daily.     • metoprolol tartrate (LOPRESSOR) 25 MG tablet Take ½ tablet by mouth Every 12 (Twelve) Hours. (Patient taking differently: Take 50 mg by mouth Every 12 (Twelve) Hours.) 30 tablet 0   • morphine (MSIR) 15 MG tablet Take 15 mg by mouth 3 (Three) Times a Day.     • nitroglycerin (NITROSTAT) 0.4 MG SL tablet Place 0.4 mg under the tongue Every 5 (Five) Minutes As Needed.     • Omega-3 Fatty Acids (FISH OIL) 1000 MG capsule capsule Take  by mouth Daily With Breakfast.     • omeprazole (priLOSEC) 20 MG capsule Take 20 mg by mouth 2 (Two) Times a Day As Needed.     • polyethylene glycol (MIRALAX) packet Take 17 g by mouth Daily.     • raNITIdine (ZANTAC) 300 MG tablet Take 300 mg by mouth Daily.     • ranolazine (RANEXA) 500 MG 12 hr tablet Take 1 tablet by mouth 2 (Two) Times a Day. 180 tablet 1   • tamsulosin (FLOMAX) 0.4 MG capsule 24 hr capsule Take 1 capsule by mouth Every Night.     • vitamin B-12 (CYANOCOBALAMIN) 1000 MCG tablet Take 1,000 mcg by mouth Daily.       No current  "facility-administered medications for this visit.        Allergies:  No Known Allergies    Vitals:  /62   Pulse 67   Temp 98 °F (36.7 °C) (Oral)   Ht 154.9 cm (61\")   Wt 92.1 kg (203 lb)   SpO2 95%   BMI 38.36 kg/m²     Imaging:    Imaging Results (most recent)     None          Pulmonary Functions Testing Results:    No results found for: FEV1, FVC, MUJ4SBH, TLC, DLCO    Results for orders placed or performed during the hospital encounter of 09/07/18   Comprehensive Metabolic Panel   Result Value Ref Range    Glucose 93 70 - 110 mg/dL    BUN 15 7 - 21 mg/dL    Creatinine 0.91 0.43 - 1.29 mg/dL    Sodium 142 135 - 153 mmol/L    Potassium 4.0 3.5 - 5.3 mmol/L    Chloride 116 (H) 99 - 112 mmol/L    CO2 24.7 24.3 - 31.9 mmol/L    Calcium 8.4 7.7 - 10.0 mg/dL    Total Protein 5.7 (L) 6.0 - 8.0 g/dL    Albumin 3.60 3.40 - 4.80 g/dL    ALT (SGPT) 17 10 - 44 U/L    AST (SGOT) 15 10 - 34 U/L    Alkaline Phosphatase 96 40 - 129 U/L    Total Bilirubin 0.8 0.2 - 1.8 mg/dL    eGFR Non African Amer 83 >60 mL/min/1.73    Globulin 2.1 gm/dL    A/G Ratio 1.7 1.5 - 2.5 g/dL    BUN/Creatinine Ratio 16.5 7.0 - 25.0    Anion Gap 1.3 (L) 3.6 - 11.2 mmol/L   CBC Auto Differential   Result Value Ref Range    WBC 6.98 4.50 - 12.50 10*3/mm3    RBC 4.53 (L) 4.70 - 6.10 10*6/mm3    Hemoglobin 13.0 (L) 14.0 - 18.0 g/dL    Hematocrit 38.7 (L) 42.0 - 52.0 %    MCV 85.4 80.0 - 94.0 fL    MCH 28.7 27.0 - 33.0 pg    MCHC 33.6 33.0 - 37.0 g/dL    RDW 14.1 11.5 - 14.5 %    RDW-SD 43.6 37.0 - 54.0 fl    MPV 10.3 (H) 6.0 - 10.0 fL    Platelets 241 130 - 400 10*3/mm3    Neutrophil % 47.0 40.0 - 75.0 %    Lymphocyte % 35.4 16.0 - 46.0 %    Monocyte % 11.2 0.0 - 12.0 %    Eosinophil % 5.7 0.0 - 7.0 %    Basophil % 0.6 0.0 - 2.0 %    Immature Grans % 0.1 0.0 - 0.5 %    Neutrophils, Absolute 3.28 1.40 - 6.50 10*3/mm3    Lymphocytes, Absolute 2.47 1.00 - 3.00 10*3/mm3    Monocytes, Absolute 0.78 0.10 - 0.90 10*3/mm3    Eosinophils, Absolute 0.40 " 0.00 - 0.70 10*3/mm3    Basophils, Absolute 0.04 0.00 - 0.30 10*3/mm3    Immature Grans, Absolute 0.01 0.00 - 0.03 10*3/mm3   Urinalysis With Microscopic If Indicated (No Culture) - Urine, Clean Catch   Result Value Ref Range    Color, UA Yellow Yellow, Straw    Appearance, UA Clear Clear    pH, UA <=5.0 5.0 - 8.0    Specific Gravity, UA 1.025 1.005 - 1.030    Glucose, UA Negative Negative    Ketones, UA Negative Negative    Bilirubin, UA Negative Negative    Blood, UA Negative Negative    Protein, UA Negative Negative    Leuk Esterase, UA Negative Negative    Nitrite, UA Negative Negative    Urobilinogen, UA 1.0 E.U./dL 0.2 - 1.0 E.U./dL   BNP   Result Value Ref Range    BNP 43.0 0.0 - 100.0 pg/mL   Troponin   Result Value Ref Range    Troponin I <0.006 <=0.040 ng/mL   Osmolality, Calculated   Result Value Ref Range    Osmolality Calc 283.6 273.0 - 305.0 mOsm/kg       Objective   Physical Exam  Physical Exam:  Constitutional:  oriented to person, place, and time. appears well-developed and well-nourished. No distress.   HENT:   Head: Normocephalic and atraumatic.   Right Ear: External ear normal.   Left Ear: External ear normal.   Nose: Nose normal.   Eyes: Pupils are equal, round, and reactive to light. Conjunctivae and EOM are normal. Right eye exhibits no discharge. Left eye exhibits no discharge. No scleral icterus.   Neck: Normal range of motion. Neck supple. No thyromegaly present.   Cardiovascular: Normal rate, regular rhythm, normal heart sounds and intact distal pulses.  Exam reveals no friction rub.    No murmur heard.  Pulmonary/Chest: No stridor.   Bilateral air entry equal.  Decreased breath sounds in left and right posterior lower lung zones.  No rhonchi heard.  Wheezing absent.  No crackles appreciated.    Abdominal: Soft. Bowel sounds are normal.exhibits no distension. There is no tenderness.   Musculoskeletal: Normal range of motion. exhibits no deformity.   No lower extremity edema bilateral.    Neurological: alert and oriented to person, place, and time. No cranial nerve deficit. Coordination normal.   Skin: Skin is warm and dry. Capillary refill takes less than 2 seconds. not diaphoretic. No erythema.   Psychiatric:   normal mood and affect.   behavior is normal.     Assessment/Plan     ICD-10-CM ICD-9-CM   1. JONNIE (obstructive sleep apnea) G47.33 327.23   2. REM behavioral disorder G47.52 327.42   3. Centrilobular emphysema (CMS/[facility]) J43.2 492.8   4. Encounter for screening for lung cancer Z12.2 V76.0   5. Former smoker Z87.891 V15.82     Obstructive sleep apnea  [place] sleepiness score: 11  STOPBANG score: 5  Plan:  - Polysomnography: Severe JONNIE. Reviewed.   - Strongly recommend to avoid alcohol, sedatives and narcotics   - Strongly recommend weight loss program  -  Ordered titration study.   - Patient was educated on positive airway pressure treatment.  As per CMS guidelines, more than 4 hours on 70% of observed nights is considered adherence. Patient was strongly encouraged to use CPAP as much as possible during sleep as more CPAP use is equal to more benefit. Use of heated humidification in positive airway pressure treatment to improve the adherence to the device.  In case of claustrophobia, we will provide the patient cognitive behavioral therapy and desensitization. Oral appliances use will be discussed with the patient in case of mild to moderate sleep apnea or if the patient with severe disease fail positive airway pressure treatment.   The patient was extensively educated on the consequences of untreated obstructive sleep apnea namely cardiovascular/metabolic disorder, neurocognitive deficit, daytime sleepiness, motor vehicle accidents, depression, mood disorders and reduced quality of life.  At the end of conversation, the patient voices understanding of the disease process and treatment modality.  Patient also understands the risk of untreated obstructive sleep apnea and benefit benefits of  the treatment.  Counseling time was greater than 10 minutes.     REM sleep behavior disorder  Patient is currently on levo dopa/carbidopa for parkinsonism.   Plan:  - Added Melatonin for REM sleep behavior disorder.   - Strongly recommend to discontinue beta blocker if possible as it can worsen RBD.  - I strongly recommend to secure the patient's environment while sleeping by removing potentially dangerous objects from the bedroom, placing cushions around the bed and move the mattress to the floor for added protection against injuries.  - I strongly advised him to maintain a normal sleep schedule, avoid sleep deprivation and keep track of any sleepiness he may have. Sleep deprivation, alcohol, certain medication can also increase REM behavior disorder and should be avoided.     COPD:  Plan:  - Added Spiriva nebs.   - Continue with albuterol when necessary  - Continue with Symbicort 2 puffs twice daily     Former smoker, more than 30-pack-year smoking history quit less than 15 years  -  Ordered low-dose CT scan during his second visit.    - Inhaler technique demonstration/discussion:  I have extensively discussed the steps.  In summary, the steps were discussed in the following order. Patient was advised to rinse the mouth after steroid inhalation to prevent fungal mucositis.  · Remove the cap from the inhaler and shake well.    · Breathe out all the way.    · Place the mouthpiece of the inhaler between your teeth and seal your lips tightly around it.    · As you start to breath in slowly, press down on the canister one time.   · Keep breathing in as slowly and deeply as you can.    · It should take about 5 seconds for you to completely breathe in.    · Hold your breath for 10 seconds(count to 10 slowly) to allow the medication to reach the airways of the lung.    · Repeat the above steps for each puff.    · Wait about 1 minute between the puffs.    · Replace the cap on the inhaler when finished.  Counseling time: 12  minutes      Patient's Body mass index is 38.36 kg/m². BMI is above normal parameters. Recommendations include: educational material and exercise counseling.  Patient is ex-smoker.   Patient is uptodate on flu and pneumonia vaccination.       Follow up in 6 months.

## 2019-01-07 ENCOUNTER — HOSPITAL ENCOUNTER (OUTPATIENT)
Dept: CT IMAGING | Facility: HOSPITAL | Age: 69
Discharge: HOME | End: 2019-01-07
Attending: INTERNAL MEDICINE | Admitting: INTERNAL MEDICINE

## 2019-01-07 DIAGNOSIS — Z87.891 FORMER SMOKER: ICD-10-CM

## 2019-01-07 DIAGNOSIS — Z12.2 ENCOUNTER FOR SCREENING FOR LUNG CANCER: ICD-10-CM

## 2019-01-07 PROCEDURE — G0297 LDCT FOR LUNG CA SCREEN: HCPCS | Performed by: RADIOLOGY

## 2019-01-07 PROCEDURE — G0297 LDCT FOR LUNG CA SCREEN: HCPCS

## 2019-03-05 ENCOUNTER — OFFICE VISIT (OUTPATIENT)
Dept: CARDIOLOGY | Facility: CLINIC | Age: 69
End: 2019-03-05

## 2019-03-05 VITALS
WEIGHT: 202.6 LBS | BODY MASS INDEX: 38.25 KG/M2 | HEIGHT: 61 IN | DIASTOLIC BLOOD PRESSURE: 72 MMHG | OXYGEN SATURATION: 98 % | SYSTOLIC BLOOD PRESSURE: 122 MMHG | HEART RATE: 65 BPM

## 2019-03-05 DIAGNOSIS — I73.9 PVD (PERIPHERAL VASCULAR DISEASE) (HCC): ICD-10-CM

## 2019-03-05 DIAGNOSIS — E78.5 DYSLIPIDEMIA: ICD-10-CM

## 2019-03-05 DIAGNOSIS — I10 ESSENTIAL HYPERTENSION: ICD-10-CM

## 2019-03-05 DIAGNOSIS — R00.2 PALPITATIONS: ICD-10-CM

## 2019-03-05 DIAGNOSIS — I25.10 ASCVD (ARTERIOSCLEROTIC CARDIOVASCULAR DISEASE): Primary | ICD-10-CM

## 2019-03-05 PROCEDURE — 99213 OFFICE O/P EST LOW 20 MIN: CPT | Performed by: PHYSICIAN ASSISTANT

## 2019-03-05 PROCEDURE — 93000 ELECTROCARDIOGRAM COMPLETE: CPT | Performed by: PHYSICIAN ASSISTANT

## 2019-03-05 RX ORDER — HYDROCODONE BITARTRATE AND ACETAMINOPHEN 10; 325 MG/1; MG/1
1 TABLET ORAL 3 TIMES DAILY
COMMUNITY

## 2019-03-05 NOTE — PROGRESS NOTES
Ever Chiang MD  Bryson Baker  1950 03/05/2019    Patient Active Problem List   Diagnosis   • Essential hypertension   • Dyslipidemia, on Lipitor   • Pulmonary emphysema (CMS/HCC)   • ASCVD with 50% stenosis in RCA in 2013   • PVD (peripheral vascular disease) (CMS/HCC)   • Precordial chest pain   • Chest pain   • Tobacco abuse   • Kumar's esophagus with dysplasia   • Palpitations       Dear Ever Chiang MD:    Subjective       History of Present Illness:    Chief Complaint   Patient presents with   • Coronary Artery Disease   • Med Management     pt has questions about Imdur and Metoprolol       Bryson Baker is a pleasant 68 y.o. male with a past medical history significant for ASCVD with 50% stenosis seen in RCA from a left heart catheterization on August 5, 2013, essential hypertension, dyslipidemia, COPD, and tobacco abuse.  Patient comes in for routine cardiology follow-up.     Patient denies any chest pain, shortness of breath, dizziness, syncope or near syncope.  He does admit to some mild palpitations that occur for only seconds and come on randomly.  He also states he has not been able to take Imdur due to low blood pressure stating whenever he takes his blood blood pressure around 90 systolic.      No Known Allergies:      Current Outpatient Medications:   •  albuterol (PROVENTIL HFA;VENTOLIN HFA) 108 (90 Base) MCG/ACT inhaler, Inhale 2 puffs Every 4 (Four) Hours As Needed for Wheezing., Disp: , Rfl:   •  aspirin 81 MG EC tablet, Take 1 tablet by mouth Daily., Disp: 30 tablet, Rfl: 0  •  atorvastatin (LIPITOR) 20 MG tablet, Take 20 mg by mouth Daily., Disp: , Rfl:   •  bisacodyl (DULCOLAX) 5 MG EC tablet, Take 10 mg by mouth 2 (Two) Times a Day., Disp: , Rfl:   •  clobetasol (TEMOVATE) 0.05 % cream, Apply 1 application topically to the appropriate area as directed 2 (Two) Times a Day As Needed (rash). Prior to [facility] Admission, Patient was on: applies to legs, Disp: , Rfl:    •  HYDROcodone-acetaminophen (NORCO)  MG per tablet, Take 1 tablet by mouth Every 6 (Six) Hours As Needed for Moderate Pain ., Disp: , Rfl:   •  Omega-3 Fatty Acids (FISH OIL) 1000 MG capsule capsule, Take  by mouth Daily With Breakfast., Disp: , Rfl:   •  omeprazole (priLOSEC) 20 MG capsule, Take 20 mg by mouth 2 (Two) Times a Day As Needed., Disp: , Rfl:   •  polyethylene glycol (MIRALAX) packet, Take 17 g by mouth Daily., Disp: , Rfl:   •  raNITIdine (ZANTAC) 300 MG tablet, Take 300 mg by mouth Daily., Disp: , Rfl:   •  ranolazine (RANEXA) 500 MG 12 hr tablet, Take 1 tablet by mouth 2 (Two) Times a Day., Disp: 180 tablet, Rfl: 1  •  tamsulosin (FLOMAX) 0.4 MG capsule 24 hr capsule, Take 1 capsule by mouth Every Night., Disp: , Rfl:   •  tiotropium bromide monohydrate (SPIRIVA RESPIMAT) 2.5 MCG/ACT aerosol solution inhaler, Inhale 1 puff Daily., Disp: 1 inhaler, Rfl: 5  •  vitamin B-12 (CYANOCOBALAMIN) 1000 MCG tablet, Take 1,000 mcg by mouth Daily., Disp: , Rfl:   •  amLODIPine (NORVASC) 5 MG tablet, Take 5 mg by mouth Daily., Disp: , Rfl:   •  betamethasone dipropionate (DIPROLENE) 0.05 % ointment, Apply 1 application topically to the appropriate area as directed Daily. Prior to [facility] Admission, Patient was on: Applies to legs for rash, Disp: , Rfl:   •  budesonide-formoterol (SYMBICORT) 160-4.5 MCG/ACT inhaler, Inhale 2 puffs 2 (Two) Times a Day., Disp: , Rfl:   •  carbidopa-levodopa CR (SINEMET CR)  MG per CR tablet, Take 1 tablet by mouth As Needed., Disp: , Rfl:   •  cetirizine (zyrTEC) 10 MG tablet, Take 10 mg by mouth Daily., Disp: , Rfl:   •  etodolac (LODINE) 200 MG capsule, Take 200 mg by mouth 3 (Three) Times a Day., Disp: , Rfl:   •  lisinopril (PRINIVIL,ZESTRIL) 40 MG tablet, Take 20 mg by mouth Daily., Disp: , Rfl:   •  melatonin 5 MG tablet tablet, Take 1 tablet by mouth Every Night., Disp: 30 tablet, Rfl: 11  •  metoprolol tartrate (LOPRESSOR) 25 MG tablet, Take ½ tablet by mouth  "Every 12 (Twelve) Hours. (Patient taking differently: Take 50 mg by mouth Every 12 (Twelve) Hours.), Disp: 30 tablet, Rfl: 0  •  morphine (MSIR) 15 MG tablet, Take 15 mg by mouth 3 (Three) Times a Day., Disp: , Rfl:   •  nitroglycerin (NITROSTAT) 0.4 MG SL tablet, Place 0.4 mg under the tongue Every 5 (Five) Minutes As Needed., Disp: , Rfl:       The following portions of the patient's history were reviewed and updated as appropriate: allergies, current medications, past family history, past medical history, past social history, past surgical history and problem list.    Social History     Tobacco Use   • Smoking status: Current Every Day Smoker     Packs/day: 1.00     Types: Cigarettes   • Smokeless tobacco: Never Used   • Tobacco comment: had quit but started back today 10/23/18   Substance Use Topics   • Alcohol use: No   • Drug use: No       Review of Systems   Constitution: Negative for weakness and malaise/fatigue.   Cardiovascular: Negative for chest pain, dyspnea on exertion and irregular heartbeat.   Respiratory: Negative for cough and shortness of breath.    Hematologic/Lymphatic: Negative for bleeding problem. Does not bruise/bleed easily.   Gastrointestinal: Negative for nausea and vomiting.       Objective   Vitals:    03/05/19 1139   BP: 122/72   BP Location: Left arm   Patient Position: Sitting   Cuff Size: Adult   Pulse: 65   SpO2: 98%   Weight: 91.9 kg (202 lb 9.6 oz)   Height: 154.9 cm (61\")     Body mass index is 38.28 kg/m².        Physical Exam   Constitutional: He is oriented to person, place, and time. He appears well-developed and well-nourished. No distress.   HENT:   Head: Normocephalic and atraumatic.   Cardiovascular: Normal rate, regular rhythm, normal heart sounds and intact distal pulses.   Pulmonary/Chest: Effort normal and breath sounds normal. No respiratory distress.   Musculoskeletal: He exhibits no edema.   Neurological: He is alert and oriented to person, place, and time. "   Skin: He is not diaphoretic.       Lab Results   Component Value Date     09/07/2018    K 4.0 09/07/2018     (H) 09/07/2018    CO2 24.7 09/07/2018    BUN 15 09/07/2018    CREATININE 0.91 09/07/2018    GLUCOSE 93 09/07/2018    CALCIUM 8.4 09/07/2018    AST 15 09/07/2018    ALT 17 09/07/2018    ALKPHOS 96 09/07/2018    LABIL2 1.6 05/05/2016     Lab Results   Component Value Date    CKTOTAL 70 08/31/2018     Lab Results   Component Value Date    WBC 6.98 09/07/2018    HGB 13.0 (L) 09/07/2018    HCT 38.7 (L) 09/07/2018     09/07/2018     Lab Results   Component Value Date    INR 1.14 (H) 08/30/2018     Lab Results   Component Value Date    MG 2.2 02/18/2014     Lab Results   Component Value Date    TSH 1.338 05/09/2016    CHLPL 139 02/19/2014    TRIG 101 08/31/2018    HDL 30 (L) 08/31/2018    LDL 48 08/31/2018      Lab Results   Component Value Date    BNP 43.0 09/07/2018       During this visit the following were done:  Labs Reviewed [x]    Labs Ordered []    Radiology Reports Reviewed [x]    Radiology Ordered []    PCP Records Reviewed []    Referring Provider Records Reviewed []    ER Records Reviewed []    Hospital Records Reviewed []    History Obtained From Family []    Radiology Images Reviewed []    Other Reviewed []    Records Requested []         ECG 12 Lead  Date/Time: 3/5/2019 11:39 AM  Performed by: Crystal Hart CMA  Authorized by: Danny Oneal PA-C   Rhythm: sinus rhythm  Conduction: conduction normal  ST Segments: ST segments normal  T inversion: V1  T flattening: III    Clinical impression: non-specific ECG  Comments: QTc 412          Assessment/Plan    Diagnosis Plan   1. ASCVD with 50% stenosis in RCA in 2013     2. Essential hypertension     3. PVD (peripheral vascular disease) (CMS/HCC)     4. Dyslipidemia, on Lipitor     5. Palpitations  Holter Monitor - 48 Hour            Recommendations:  1. Will evaluate his palpitations further with holter monitor.    2. Continue current regimen follow-up in 3 months.        Return in about 3 months (around 6/5/2019).    As always, I appreciate very much the opportunity to participate in the cardiovascular care of your patients.      With Best Regards,    Danny Oneal PA-C

## 2022-03-15 ENCOUNTER — TELEPHONE (OUTPATIENT)
Dept: SURGERY | Facility: CLINIC | Age: 72
End: 2022-03-15

## 2022-03-15 NOTE — TELEPHONE ENCOUNTER
CALLED PT TO SCHEDULE NEXT AVAILABLE W/ [facility] [place]. CONFIRM PT WANTS [place] BE SEEN [place]. NO ANSWER/LMOM

## 2022-03-15 NOTE — TELEPHONE ENCOUNTER
Provider: DR. MARISCAL  Caller: FANNY GARNER  Relationship to Patient: SELF    Phone Number: [phone]  Reason for Call: PATIENT CALLED BACK. PREFERRED TO BE SEEN IN [place]. [facility] SCHEDULED PATIENT WITH DR. JOSEPH IN [place] 3/24/22.

## 2022-04-04 ENCOUNTER — NURSE NAVIGATOR (OUTPATIENT)
Dept: PULMONOLOGY | Facility: CLINIC | Age: 72
End: 2022-04-04

## 2022-04-04 ENCOUNTER — OFFICE VISIT (OUTPATIENT)
Dept: PULMONOLOGY | Facility: CLINIC | Age: 72
End: 2022-04-04

## 2022-04-04 VITALS
WEIGHT: 221.6 LBS | HEART RATE: 88 BPM | BODY MASS INDEX: 31.02 KG/M2 | RESPIRATION RATE: 20 BRPM | TEMPERATURE: 97.5 F | HEIGHT: 71 IN | DIASTOLIC BLOOD PRESSURE: 81 MMHG | SYSTOLIC BLOOD PRESSURE: 149 MMHG | OXYGEN SATURATION: 96 %

## 2022-04-04 DIAGNOSIS — R05.3 CHRONIC COUGH: ICD-10-CM

## 2022-04-04 DIAGNOSIS — R06.09 DOE (DYSPNEA ON EXERTION): ICD-10-CM

## 2022-04-04 DIAGNOSIS — Z72.0 TOBACCO ABUSE: ICD-10-CM

## 2022-04-04 DIAGNOSIS — J44.9 COPD MIXED TYPE: ICD-10-CM

## 2022-04-04 DIAGNOSIS — R91.8 MULTIPLE PULMONARY NODULES: Primary | ICD-10-CM

## 2022-04-04 PROCEDURE — 99205 OFFICE O/P NEW HI 60 MIN: CPT | Performed by: INTERNAL MEDICINE

## 2022-04-04 RX ORDER — NICOTINE 21 MG/24HR
1 PATCH, TRANSDERMAL 24 HOURS TRANSDERMAL EVERY 24 HOURS
Qty: 14 PATCH | Refills: 3 | Status: SHIPPED | OUTPATIENT
Start: 2022-04-04 | End: 2022-07-12

## 2022-04-04 NOTE — PROGRESS NOTES
Left femoral neck fracture    Relevant Problems   No relevant active problems   BPH  Platelets 117    Very Active METs>5     Physical Exam    Airway   Mallampati: II  TM distance: >3 FB  Neck ROM: full       Cardiovascular - normal exam  Rhythm: regular  Rate: normal  (-) murmur     Dental - normal exam         Pulmonary - normal exam  Breath sounds clear to auscultation     Abdominal    Neurological - normal exam                 Anesthesia Plan    ASA 2       Plan - general       Airway plan will be ETT        Induction: intravenous    Postoperative Plan: Postoperative administration of opioids is intended.    Pertinent diagnostic labs and testing reviewed    Informed Consent:    Anesthetic plan and risks discussed with patient.    Use of blood products discussed with: patient whom consented to blood products.          Nurse Navigator met with patient.  Pt is seen in new consultation with Dr. Delgado.  Pt was referred to lung nodule clinic for h/o abnormal chest CT scan; last completed in July 2021, per pt.  Dr. Delgado's recommendation and plan is to order and complete a new CT chest soon, [facility], to write prescription for nicotine patches, and for patient to continue current maintenance & prn inhalers.  Pt verbalized understanding and is in agreement with plan of care.  The role of the NN introduced to patient.  NN contact information provided and encouraged for patient to call with any questions or concerns.  NN will follow and assist prn.

## 2022-04-04 NOTE — PROGRESS NOTES
"  PULMONARY  NOTE    Chief Complaint     Pulmonary nodules, tobacco abuse, COPD, dyspnea on exertion, chronic cough    History of Present Illness     8-year-old male referred for evaluation of pulmonary nodules  He previously had been seen in the pulmonary clinic by Dr. Martin, last in 2019  Subsequent to that he has been followed through the [facility], apparently    He has a long history of tobacco abuse which is ongoing  He smokes up to 1 pack of cigarettes per day  He was able to stop for a period of time using the nicotine patches but \"ran out\" he is interested in trying smoking cessation again    He has dyspnea on exertion  He uses Symbicort, Spiriva, and albuterol  These are covered by the [facility]  He feels that they help with his symptoms    He has a chronic cough which she has had since the 1970s  He has not had any hemoptysis    Patient Active Problem List   Diagnosis   • Essential hypertension   • Dyslipidemia, on Lipitor   • ASCVD with 50% stenosis in RCA in 2013   • PVD (peripheral vascular disease) (HCC)   • Precordial chest pain   • Chest pain   • Tobacco abuse   • Kumar's esophagus with dysplasia   • Palpitations   • Emphysema   • Multiple pulmonary nodules   • Chronic cough   • GAYTAN (dyspnea on exertion)     No Known Allergies    Current Outpatient Medications:   •  albuterol (PROVENTIL HFA;VENTOLIN HFA) 108 (90 Base) MCG/ACT inhaler, Inhale 2 puffs Every 4 (Four) Hours As Needed for Wheezing., Disp: , Rfl:   •  amLODIPine (NORVASC) 5 MG tablet, Take 5 mg by mouth Daily., Disp: , Rfl:   •  aspirin 81 MG EC tablet, Take 1 tablet by mouth Daily., Disp: 30 tablet, Rfl: 0  •  atorvastatin (LIPITOR) 20 MG tablet, Take 20 mg by mouth Daily., Disp: , Rfl:   •  betamethasone dipropionate (DIPROLENE) 0.05 % ointment, Apply 1 application topically to the appropriate area as directed Daily. Prior to [facility] Admission, Patient was on: Applies to legs for rash, Disp: , Rfl:   •  bisacodyl (DULCOLAX) 5 MG EC tablet, " Take 10 mg by mouth 2 (Two) Times a Day., Disp: , Rfl:   •  budesonide-formoterol (SYMBICORT) 160-4.5 MCG/ACT inhaler, Inhale 2 puffs 2 (Two) Times a Day., Disp: , Rfl:   •  carbidopa-levodopa CR (SINEMET CR)  MG per CR tablet, Take 1 tablet by mouth As Needed., Disp: , Rfl:   •  cetirizine (zyrTEC) 10 MG tablet, Take 10 mg by mouth Daily., Disp: , Rfl:   •  clobetasol (TEMOVATE) 0.05 % cream, Apply 1 application topically to the appropriate area as directed 2 (Two) Times a Day As Needed (rash). Prior to [facility] Admission, Patient was on: applies to legs, Disp: , Rfl:   •  etodolac (LODINE) 200 MG capsule, Take 200 mg by mouth 3 (Three) Times a Day., Disp: , Rfl:   •  HYDROcodone-acetaminophen (NORCO)  MG per tablet, Take 1 tablet by mouth Every 6 (Six) Hours As Needed for Moderate Pain ., Disp: , Rfl:   •  lisinopril (PRINIVIL,ZESTRIL) 40 MG tablet, Take 20 mg by mouth Daily., Disp: , Rfl:   •  melatonin 5 MG tablet tablet, Take 1 tablet by mouth Every Night., Disp: 30 tablet, Rfl: 11  •  metoprolol tartrate (LOPRESSOR) 25 MG tablet, Take ½ tablet by mouth Every 12 (Twelve) Hours. (Patient taking differently: Take 50 mg by mouth Every 12 (Twelve) Hours.), Disp: 30 tablet, Rfl: 0  •  morphine (MSIR) 15 MG tablet, Take 15 mg by mouth 3 (Three) Times a Day., Disp: , Rfl:   •  nitroglycerin (NITROSTAT) 0.4 MG SL tablet, Place 0.4 mg under the tongue Every 5 (Five) Minutes As Needed., Disp: , Rfl:   •  Omega-3 Fatty Acids (FISH OIL) 1000 MG capsule capsule, Take  by mouth Daily With Breakfast., Disp: , Rfl:   •  omeprazole (priLOSEC) 20 MG capsule, Take 20 mg by mouth 2 (Two) Times a Day As Needed., Disp: , Rfl:   •  polyethylene glycol (MIRALAX) packet, Take 17 g by mouth Daily., Disp: , Rfl:   •  raNITIdine (ZANTAC) 300 MG tablet, Take 300 mg by mouth Daily., Disp: , Rfl:   •  ranolazine (RANEXA) 500 MG 12 hr tablet, Take 1 tablet by mouth 2 (Two) Times a Day., Disp: 180 tablet, Rfl: 1  •  tamsulosin  "(FLOMAX) 0.4 MG capsule 24 hr capsule, Take 1 capsule by mouth Every Night., Disp: , Rfl:   •  tiotropium bromide monohydrate (SPIRIVA RESPIMAT) 2.5 MCG/ACT aerosol solution inhaler, Inhale 1 puff Daily., Disp: 1 inhaler, Rfl: 5  •  vitamin B-12 (CYANOCOBALAMIN) 1000 MCG tablet, Take 1,000 mcg by mouth Daily., Disp: , Rfl:   MEDICATION LIST AND ALLERGIES REVIEWED.    Family History   Problem Relation Age of Onset   • Cancer Mother    • Heart disease Mother    • Cancer Father    • Cancer Sister    • Cancer Brother      Social History     Tobacco Use   • Smoking status: Current Every Day Smoker     Packs/day: 1.00     Types: Cigarettes   • Smokeless tobacco: Never Used   • Tobacco comment: had quit but started back today 10/23/18   Substance Use Topics   • Alcohol use: No   • Drug use: No     Social History     Social History Narrative   • Not on file     FAMILY AND SOCIAL HISTORY REVIEWED.    Review of Systems  ALSO REFER TO SCANNED ROS SHEET FROM SAME DATE.    /81   Pulse 88   Temp 97.5 °F (36.4 °C) (Temporal)   Resp 20   Ht 180.3 cm (71\")   Wt 101 kg (221 lb 9.6 oz)   SpO2 96%   BMI 30.91 kg/m²   Physical Exam  Vitals and nursing note reviewed.   Constitutional:       General: He is not in acute distress.     Appearance: He is well-developed. He is not diaphoretic.   HENT:      Head: Normocephalic and atraumatic.   Neck:      Thyroid: No thyromegaly.   Cardiovascular:      Rate and Rhythm: Normal rate and regular rhythm.      Heart sounds: Normal heart sounds. No murmur heard.  Pulmonary:      Effort: Pulmonary effort is normal.      Breath sounds: No stridor.      Comments: Bilateral wheezes  Chest:   Breasts:      Right: No supraclavicular adenopathy.      Left: No supraclavicular adenopathy.       Musculoskeletal:         General: Normal range of motion.   Lymphadenopathy:      Cervical: No cervical adenopathy.      Upper Body:      Right upper body: No supraclavicular or epitrochlear adenopathy.    "   Left upper body: No supraclavicular or epitrochlear adenopathy.   Skin:     General: Skin is warm and dry.   Neurological:      Mental Status: He is alert and oriented to person, place, and time.   Psychiatric:         Behavior: Behavior normal.         Results     CT Chest 2019 reviewed on PACS.  Bilateral pulmonary nodules max 6mm in size    Immunization History   Administered Date(s) Administered   • COVID-19 (PFIZER) PURPLE CAP 10/14/2021   • Fluzone High Dose =>65 Years ([facility] ONLY) 09/14/2016, 10/01/2018   • Pneumococcal Conjugate 13-Valent (PCV13) 09/14/2016     Problem List       ICD-10-CM ICD-9-CM   1. Multiple pulmonary nodules  R91.8 793.19   2. Emphysema  J44.9 496   3. Tobacco abuse  Z72.0 305.1   4. Chronic cough  R05.3 786.2   5. GAYTAN (dyspnea on exertion)  R06.00 786.09       Discussion     I do not have his most recent [facility] CT scan for review  We will get that for follow-up  As of 2019, he had small pulmonary nodules and at that point the recommendation would be annual LDCT.    He did like to [facility] CT [facility]  We will schedule his next CT scan of the chest for [facility].    We discussed the need for smoking cessation and we discussed smoking cessation strategies  He is interested in trying the nicotine patches again  I will submit a prescription    I recommended that he stay on the Symbicort, Spiriva, and albuterol as needed    We will see him back after his repeat CT scan of the chest    Level of service justified based on 44 minutes spent in patient care on this date of service including, but not limited to: preparing to see the patient, obtaining and/or reviewing history, performing medically appropriate examination, ordering tests/medicine/procedures, independently interpreting results, documenting clinical information in EHR, and counseling/education of patient/family/caregiver (excluding time spent on other separate services such as  performing procedures or test interpretation, if applicable). (Level 4 30-39 minutes; Level 5 40-54 minutes)    Crispin Delgado MD  Note electronically signed    CC: Ever Chiang MD

## 2022-04-11 ENCOUNTER — OFFICE VISIT (OUTPATIENT)
Dept: CARDIOLOGY | Facility: CLINIC | Age: 72
End: 2022-04-11

## 2022-04-11 VITALS
SYSTOLIC BLOOD PRESSURE: 144 MMHG | BODY MASS INDEX: 30.94 KG/M2 | TEMPERATURE: 98.2 F | WEIGHT: 221 LBS | HEIGHT: 71 IN | DIASTOLIC BLOOD PRESSURE: 83 MMHG | HEART RATE: 79 BPM

## 2022-04-11 DIAGNOSIS — R07.2 PRECORDIAL PAIN: Primary | ICD-10-CM

## 2022-04-11 DIAGNOSIS — Z72.0 TOBACCO ABUSE: ICD-10-CM

## 2022-04-11 DIAGNOSIS — I10 ESSENTIAL HYPERTENSION: ICD-10-CM

## 2022-04-11 DIAGNOSIS — R06.09 DYSPNEA ON EXERTION: ICD-10-CM

## 2022-04-11 PROCEDURE — 93000 ELECTROCARDIOGRAM COMPLETE: CPT | Performed by: INTERNAL MEDICINE

## 2022-04-11 PROCEDURE — 99204 OFFICE O/P NEW MOD 45 MIN: CPT | Performed by: INTERNAL MEDICINE

## 2022-04-11 RX ORDER — PANTOPRAZOLE SODIUM 40 MG/1
40 TABLET, DELAYED RELEASE ORAL 2 TIMES DAILY
COMMUNITY

## 2022-04-11 RX ORDER — DIPHENHYDRAMINE HCL 50 MG
50 CAPSULE ORAL EVERY 6 HOURS PRN
COMMUNITY
End: 2022-07-12

## 2022-04-11 RX ORDER — GABAPENTIN 400 MG/1
400 CAPSULE ORAL 3 TIMES DAILY
COMMUNITY

## 2022-05-03 ENCOUNTER — HOSPITAL ENCOUNTER (OUTPATIENT)
Dept: CT IMAGING | Facility: HOSPITAL | Age: 72
Discharge: HOME | End: 2022-05-03
Admitting: INTERNAL MEDICINE

## 2022-05-03 DIAGNOSIS — Z72.0 TOBACCO ABUSE: ICD-10-CM

## 2022-05-03 DIAGNOSIS — J44.9 COPD MIXED TYPE: ICD-10-CM

## 2022-05-03 DIAGNOSIS — R06.09 DOE (DYSPNEA ON EXERTION): ICD-10-CM

## 2022-05-03 DIAGNOSIS — R05.3 CHRONIC COUGH: ICD-10-CM

## 2022-05-03 DIAGNOSIS — R91.8 MULTIPLE PULMONARY NODULES: ICD-10-CM

## 2022-05-03 PROCEDURE — 71250 CT THORAX DX C-: CPT | Performed by: RADIOLOGY

## 2022-05-03 PROCEDURE — 71250 CT THORAX DX C-: CPT

## 2022-05-05 ENCOUNTER — DOCUMENTATION (OUTPATIENT)
Dept: PULMONOLOGY | Facility: CLINIC | Age: 72
End: 2022-05-05

## 2022-05-05 NOTE — PROGRESS NOTES
CT scan from May 3, 2022 reviewed on PACS and compared to his LDCT from January 7, 2019  Several pulmonary nodules noted  There is a right lower lobe pulmonary nodule that was probably about 5 mm in 2019 and now about 7 mm  It does have more of a soft tissue component    Would recommend a 3-month follow-up CT scan of the chest  Will discuss when he returns to the office next week

## 2022-05-09 ENCOUNTER — OFFICE VISIT (OUTPATIENT)
Dept: PULMONOLOGY | Facility: CLINIC | Age: 72
End: 2022-05-09

## 2022-05-09 VITALS
WEIGHT: 222 LBS | RESPIRATION RATE: 18 BRPM | BODY MASS INDEX: 31.08 KG/M2 | TEMPERATURE: 97.7 F | OXYGEN SATURATION: 97 % | HEIGHT: 71 IN | HEART RATE: 83 BPM | SYSTOLIC BLOOD PRESSURE: 168 MMHG | DIASTOLIC BLOOD PRESSURE: 89 MMHG

## 2022-05-09 DIAGNOSIS — R91.8 MULTIPLE PULMONARY NODULES: ICD-10-CM

## 2022-05-09 DIAGNOSIS — R05.3 CHRONIC COUGH: ICD-10-CM

## 2022-05-09 DIAGNOSIS — J44.9 COPD MIXED TYPE: Primary | ICD-10-CM

## 2022-05-09 DIAGNOSIS — Z72.0 TOBACCO ABUSE: ICD-10-CM

## 2022-05-09 DIAGNOSIS — E66.9 OBESITY (BMI 30-39.9): ICD-10-CM

## 2022-05-09 PROCEDURE — 99214 OFFICE O/P EST MOD 30 MIN: CPT | Performed by: NURSE PRACTITIONER

## 2022-05-09 NOTE — PROGRESS NOTES
"Chief Complaint  multiple pulmonary nodules    Subjective          Bryson Baker presents to [facility]  History of Present Illness     Mr. Baker is a 71 year old male with a medical history significant for anxiety, arthritis, asthma, COPD, CAD, GERD, hyperlipidemia, hypertension and sleep apnea.    He presents today for routine follow-up on multiple pulmonary nodules.  He had a CT chest completed at the [facility] which showed small pulmonary nodules.  The recommendation at this time was to continue with annual LDCT screenings.  He wishes to have his next scan done [facility]  He states that he is also still smoking.  He also tells me that he was once told that he had lupus and has 3 [facility] before another doctor told him that he did not have this.  He is confused as to whether or not he actually has lupus.      Objective   Vital Signs:  /89   Pulse 83   Temp 97.7 °F (36.5 °C) (Temporal)   Resp 18   Ht 180.3 cm (71\")   Wt 101 kg (222 lb)   SpO2 97%   BMI 30.96 kg/m²     BMI is >= 30 and <= 34.9 (Class 1 obesity). The following options were offered after discussion: exercise counseling/recommendations and nutrition counseling/recommendations      Physical Exam     GENERAL APPEARANCE: Well developed, well nourished, alert and cooperative, and appears to be in no acute distress.    HEAD: normocephalic. Atraumatic.    EYES: PERRL, EOMI. Vision is grossly intact.    THROAT: Oral cavity and pharynx normal. No inflammation, swelling, exudate, or lesions.     NECK: Neck supple.  No thyromegaly.    CARDIAC: Normal S1 and S2. No S3, S4 or murmurs. Rhythm is regular.     RESPIRATORY:Bilateral air entry positive. Bilateral diminished breath sounds. No wheezing, crackles or rhonchi noted.    GI: Positive bowel sounds. Soft, nondistended, nontender.     MUSCULOSKELETAL: No significant deformity or joint abnormality. " No edema. Peripheral pulses intact. No varicosities.    NEUROLOGICAL: Strength and sensation symmetric and intact throughout.     PSYCHIATRIC: The mental examination revealed the patient was oriented to person, place, and time.     Result Review :   The following data was reviewed by: ANGELIC Michaels on 05/09/2022:               Assessment and Plan    Diagnoses and all orders for this visit:    1. Emphysema (Primary)    2. Chronic cough    3. Multiple pulmonary nodules  -     CT Chest Without Contrast; Future    4. Tobacco abuse    5. Obesity (BMI 30-39.9)             CT chest was completed and reviewed.  Bilateral pulmonary nodules for noted with the largest being 7 mm in size.  This nodule appeared to be 5 mm on previous scans when compared.  We will repeat CT chest without contrast in 3 months for further follow-up on stability of pulmonary nodules.    Bryson Baker  reports that he has been smoking cigarettes. He has been smoking about 1.00 pack per day. He has never used smokeless tobacco.. I have educated him on the risk of diseases from using tobacco products such as cancer, COPD and heart disease.     I advised him to quit and he is not willing to quit.    Will continue Symbicort, Spiriva and albuterol as needed.    I will obtain records from [facility] in regards to the patient's lupus diagnosis.         Follow Up   Return in about 3 months (around 8/9/2022).  Patient was given instructions and counseling regarding his condition or for health maintenance advice. Please see specific information pulled into the AVS if appropriate.

## 2022-05-11 ENCOUNTER — HOSPITAL ENCOUNTER (OUTPATIENT)
Dept: NUCLEAR MEDICINE | Facility: HOSPITAL | Age: 72
Discharge: HOME | End: 2022-05-11

## 2022-05-11 ENCOUNTER — HOSPITAL ENCOUNTER (OUTPATIENT)
Dept: CARDIOLOGY | Facility: HOSPITAL | Age: 72
Discharge: HOME | End: 2022-05-11

## 2022-05-11 DIAGNOSIS — R06.09 DYSPNEA ON EXERTION: ICD-10-CM

## 2022-05-11 DIAGNOSIS — R07.2 PRECORDIAL PAIN: ICD-10-CM

## 2022-05-11 LAB
BH CV ECHO MEAS - ACS: 2.3 CM
BH CV ECHO MEAS - AO MAX PG: 7.3 MMHG
BH CV ECHO MEAS - AO MEAN PG: 3 MMHG
BH CV ECHO MEAS - AO ROOT DIAM: 3.5 CM
BH CV ECHO MEAS - AO V2 MAX: 135 CM/SEC
BH CV ECHO MEAS - AO V2 VTI: 26.8 CM
BH CV ECHO MEAS - EDV(CUBED): 112.3 ML
BH CV ECHO MEAS - EDV(MOD-SP4): 94.5 ML
BH CV ECHO MEAS - EF(MOD-SP4): 53.2 %
BH CV ECHO MEAS - ESV(CUBED): 51.7 ML
BH CV ECHO MEAS - ESV(MOD-SP4): 44.2 ML
BH CV ECHO MEAS - FS: 22.8 %
BH CV ECHO MEAS - IVS/LVPW: 1.1 CM
BH CV ECHO MEAS - IVSD: 1.31 CM
BH CV ECHO MEAS - LA DIMENSION: 3.3 CM
BH CV ECHO MEAS - LAT PEAK E' VEL: 7.6 CM/SEC
BH CV ECHO MEAS - LV DIASTOLIC VOL/BSA (35-75): 42.9 CM2
BH CV ECHO MEAS - LV MASS(C)D: 233.5 GRAMS
BH CV ECHO MEAS - LV SYSTOLIC VOL/BSA (12-30): 20 CM2
BH CV ECHO MEAS - LVIDD: 4.8 CM
BH CV ECHO MEAS - LVIDS: 3.7 CM
BH CV ECHO MEAS - LVOT AREA: 4.9 CM2
BH CV ECHO MEAS - LVOT DIAM: 2.5 CM
BH CV ECHO MEAS - LVPWD: 1.19 CM
BH CV ECHO MEAS - MED PEAK E' VEL: 5 CM/SEC
BH CV ECHO MEAS - MV A MAX VEL: 77.1 CM/SEC
BH CV ECHO MEAS - MV E MAX VEL: 42 CM/SEC
BH CV ECHO MEAS - MV E/A: 0.54
BH CV ECHO MEAS - PA ACC TIME: 0.12 SEC
BH CV ECHO MEAS - PA PR(ACCEL): 23.7 MMHG
BH CV ECHO MEAS - SI(MOD-SP4): 22.8 ML/M2
BH CV ECHO MEAS - SV(MOD-SP4): 50.3 ML
BH CV ECHO MEASUREMENTS AVERAGE E/E' RATIO: 6.67
BH CV NUCLEAR PRIOR STUDY: 3
BH CV REST NUCLEAR ISOTOPE DOSE: 10.2 MCI
BH CV STRESS BP STAGE 1: NORMAL
BH CV STRESS BP STAGE 2: NORMAL
BH CV STRESS COMMENTS STAGE 1: NORMAL
BH CV STRESS COMMENTS STAGE 2: NORMAL
BH CV STRESS DOSE REGADENOSON STAGE 1: 0.4
BH CV STRESS DURATION MIN STAGE 1: 0
BH CV STRESS DURATION MIN STAGE 2: 4
BH CV STRESS DURATION SEC STAGE 1: 10
BH CV STRESS DURATION SEC STAGE 2: 0
BH CV STRESS HR STAGE 1: 79
BH CV STRESS HR STAGE 2: 81
BH CV STRESS NUCLEAR ISOTOPE DOSE: 30.2 MCI
BH CV STRESS PROTOCOL 1: NORMAL
BH CV STRESS RECOVERY BP: NORMAL MMHG
BH CV STRESS RECOVERY HR: 75 BPM
BH CV STRESS STAGE 1: 1
BH CV STRESS STAGE 2: 2
LEFT ATRIUM VOLUME INDEX: 14.6 ML/M2
MAXIMAL PREDICTED HEART RATE: 150 BPM
MAXIMAL PREDICTED HEART RATE: 150 BPM
PERCENT MAX PREDICTED HR: 54 %
STRESS BASELINE BP: NORMAL MMHG
STRESS BASELINE HR: 65 BPM
STRESS PERCENT HR: 64 %
STRESS POST PEAK BP: NORMAL MMHG
STRESS POST PEAK HR: 81 BPM
STRESS TARGET HR: 128 BPM
STRESS TARGET HR: 128 BPM

## 2022-05-11 PROCEDURE — A9500 TC99M SESTAMIBI: HCPCS | Performed by: INTERNAL MEDICINE

## 2022-05-11 PROCEDURE — 25010000002 REGADENOSON 0.4 MG/5ML SOLUTION: Performed by: INTERNAL MEDICINE

## 2022-05-11 PROCEDURE — 0 TECHNETIUM SESTAMIBI: Performed by: INTERNAL MEDICINE

## 2022-05-11 PROCEDURE — 78452 HT MUSCLE IMAGE SPECT MULT: CPT

## 2022-05-11 PROCEDURE — 93017 CV STRESS TEST TRACING ONLY: CPT

## 2022-05-11 PROCEDURE — 93018 CV STRESS TEST I&R ONLY: CPT | Performed by: INTERNAL MEDICINE

## 2022-05-11 PROCEDURE — 78452 HT MUSCLE IMAGE SPECT MULT: CPT | Performed by: INTERNAL MEDICINE

## 2022-05-11 PROCEDURE — 93306 TTE W/DOPPLER COMPLETE: CPT

## 2022-05-11 PROCEDURE — 93306 TTE W/DOPPLER COMPLETE: CPT | Performed by: INTERNAL MEDICINE

## 2022-05-11 RX ADMIN — TECHNETIUM TC 99M SESTAMIBI 1 DOSE: 1 INJECTION INTRAVENOUS at 10:00

## 2022-05-11 RX ADMIN — TECHNETIUM TC 99M SESTAMIBI 1 DOSE: 1 INJECTION INTRAVENOUS at 08:52

## 2022-05-11 RX ADMIN — REGADENOSON 0.4 MG: 0.08 INJECTION, SOLUTION INTRAVENOUS at 10:00

## 2022-05-18 ENCOUNTER — OFFICE VISIT (OUTPATIENT)
Dept: CARDIOLOGY | Facility: CLINIC | Age: 72
End: 2022-05-18

## 2022-05-18 VITALS
HEIGHT: 71 IN | TEMPERATURE: 97.3 F | BODY MASS INDEX: 30.55 KG/M2 | WEIGHT: 218.2 LBS | HEART RATE: 64 BPM | DIASTOLIC BLOOD PRESSURE: 89 MMHG | SYSTOLIC BLOOD PRESSURE: 156 MMHG

## 2022-05-18 DIAGNOSIS — I25.10 ASCVD (ARTERIOSCLEROTIC CARDIOVASCULAR DISEASE): ICD-10-CM

## 2022-05-18 DIAGNOSIS — R00.2 PALPITATIONS: ICD-10-CM

## 2022-05-18 DIAGNOSIS — R07.2 PRECORDIAL PAIN: Primary | ICD-10-CM

## 2022-05-18 DIAGNOSIS — I10 ESSENTIAL HYPERTENSION: ICD-10-CM

## 2022-05-18 PROCEDURE — 93246 EXT ECG>7D<15D RECORDING: CPT | Performed by: INTERNAL MEDICINE

## 2022-05-18 PROCEDURE — 99214 OFFICE O/P EST MOD 30 MIN: CPT | Performed by: PHYSICIAN ASSISTANT

## 2022-05-18 RX ORDER — LOSARTAN POTASSIUM 25 MG/1
25 TABLET ORAL DAILY
Qty: 30 TABLET | Refills: 3 | Status: SHIPPED | OUTPATIENT
Start: 2022-05-18

## 2022-05-18 NOTE — PROGRESS NOTES
Fabiola Gipson, APRN  Bryson Baker  1950 05/18/2022    Patient Active Problem List   Diagnosis   • Essential hypertension   • Dyslipidemia, on Lipitor   • ASCVD with 50% stenosis in RCA in 2013   • PVD (peripheral vascular disease) (HCC)   • Precordial chest pain   • Chest pain   • Tobacco abuse   • Kumar's esophagus with dysplasia   • Palpitations   • Emphysema   • Multiple pulmonary nodules   • Chronic cough   • GAYTAN (dyspnea on exertion)       Dear Fabiola Gipson, ANGELIC:    Subjective     History of Present Illness:    Chief Complaint   Patient presents with   • Follow-up       Bryson Baker is a pleasant 71 y.o. male with a past medical history significant for nonobstructive CAD with 50% stenosis seen in the RCA.  He also has a history of tobacco abuse, essential hypertension.  He comes in today for follow-up on stress and echo.    Mr. Baker's stress test did show small sized mild degree of ischemia in inferior wall consistent with a low to intermediate risk study.  Echocardiogram revealed normal LVEF with no significant valvular abnormalities.  He does still report chest pains that he describes as a pressure in the center of his chest that last for several minutes few times a week.  Reports that these typically come on at rest but can be brought on with exertion.  He reports that these pains typically come on most often when he has palpitations with rapid heart rates he reports this essentially comes on with every episode of chest pain.        Left heart catheterization on 8/5/2014  CONCLUSION AND COMMENTS:  The patient is a 64-year-old  male with recent class III-IV anginal symptoms and multiple risk factors for coronary artery disease. His cardiac catheterization reveals:   1. Normal left main coronary artery.   2. Normal left anterior descending and left circumflex coronary artery.   3. Right coronary artery is dominant for posterior circulation with moderate diffuse narrowing of about 50%  in the distal segment of a small posterior descending artery branch.   4. Normal left ventricular chamber size, wall motion, and systolic function with an estimated LV ejection fraction of about 60%.       RECOMMENDATIONS: In view of nonfocal and nonsignificant degree of  atherosclerotic disease, would continue to emphasize on risk factor  modification as needed for now and perhaps look at a noncardiac cause for her  symptoms should they persist.           No Known Allergies:      Current Outpatient Medications:   •  albuterol (PROVENTIL HFA;VENTOLIN HFA) 108 (90 Base) MCG/ACT inhaler, Inhale 2 puffs Every 4 (Four) Hours As Needed for Wheezing., Disp: , Rfl:   •  aspirin 81 MG EC tablet, Take 1 tablet by mouth Daily., Disp: 30 tablet, Rfl: 0  •  atorvastatin (LIPITOR) 20 MG tablet, Take 40 mg by mouth Daily., Disp: , Rfl:   •  betamethasone dipropionate (DIPROLENE) 0.05 % ointment, Apply 1 application topically to the appropriate area as directed Daily. Prior to [facility]mission, Patient was on: Applies to legs for rash, Disp: , Rfl:   •  budesonide-formoterol (SYMBICORT) 160-4.5 MCG/ACT inhaler, Inhale 2 puffs 2 (Two) Times a Day., Disp: , Rfl:   •  cetirizine (zyrTEC) 10 MG tablet, Take 10 mg by mouth Daily., Disp: , Rfl:   •  diphenhydrAMINE (BENADRYL) 50 MG capsule, Take 50 mg by mouth Every 6 (Six) Hours As Needed for Itching., Disp: , Rfl:   •  gabapentin (NEURONTIN) 400 MG capsule, Take 400 mg by mouth 3 (Three) Times a Day., Disp: , Rfl:   •  HYDROcodone-acetaminophen (NORCO)  MG per tablet, Take 1 tablet by mouth Every 6 (Six) Hours As Needed for Moderate Pain ., Disp: , Rfl:   •  metoprolol tartrate (LOPRESSOR) 25 MG tablet, Take ½ tablet by mouth Every 12 (Twelve) Hours., Disp: 30 tablet, Rfl: 0  •  nitroglycerin (NITROSTAT) 0.4 MG SL tablet, Place 0.4 mg under the tongue Every 5 (Five) Minutes As Needed., Disp: , Rfl:   •  pantoprazole (PROTONIX) 40 MG EC tablet, Take 40 mg by mouth Daily., Disp:  ", Rfl:   •  polyethylene glycol (MIRALAX) packet, Take 17 g by mouth Daily., Disp: , Rfl:   •  ranolazine (RANEXA) 500 MG 12 hr tablet, Take 1 tablet by mouth 2 (Two) Times a Day., Disp: 180 tablet, Rfl: 1  •  tamsulosin (FLOMAX) 0.4 MG capsule 24 hr capsule, Take 1 capsule by mouth Every Night., Disp: , Rfl:   •  tiotropium bromide monohydrate (SPIRIVA RESPIMAT) 2.5 MCG/ACT aerosol solution inhaler, Inhale 1 puff Daily., Disp: 1 inhaler, Rfl: 5  •  bisacodyl (DULCOLAX) 5 MG EC tablet, Take 10 mg by mouth 2 (Two) Times a Day., Disp: , Rfl:   •  clobetasol (TEMOVATE) 0.05 % cream, Apply 1 application topically to the appropriate area as directed 2 (Two) Times a Day As Needed (rash). Prior to [facility] Admission, Patient was on: applies to legs, Disp: , Rfl:   •  losartan (Cozaar) 25 MG tablet, Take 1 tablet by mouth Daily., Disp: 30 tablet, Rfl: 3  •  nicotine (Nicoderm CQ) 14 MG/24HR patch, Place 1 patch on the skin as directed by provider Daily., Disp: 14 patch, Rfl: 3  •  nicotine (Nicoderm CQ) 21 MG/24HR patch, Place 1 patch on the skin as directed by provider Daily., Disp: 14 patch, Rfl: 3    The following portions of the patient's history were reviewed and updated as appropriate: allergies, current medications, past family history, past medical history, past social history, past surgical history and problem list.    Social History     Tobacco Use   • Smoking status: Current Every Day Smoker     Packs/day: 1.00     Types: Cigarettes   • Smokeless tobacco: Never Used   • Tobacco comment: had quit but started back today 10/23/18   Vaping Use   • Vaping Use: Never used   Substance Use Topics   • Alcohol use: No   • Drug use: No         Objective   Vitals:    05/18/22 1055   BP: 156/89   Pulse: 64   Temp: 97.3 °F (36.3 °C)   Weight: 99 kg (218 lb 3.2 oz)   Height: 180.3 cm (70.98\")     Body mass index is 30.45 kg/m².    Constitutional:       General: Not in acute distress.     Appearance: Healthy appearance. " Well-developed and not in distress. Not diaphoretic.   Eyes:      Conjunctiva/sclera: Conjunctivae normal.      Pupils: Pupils are equal, round, and reactive to light.   HENT:      Head: Normocephalic and atraumatic.   Neck:      Vascular: No carotid bruit or JVD.   Pulmonary:      Effort: Pulmonary effort is normal. No respiratory distress.      Breath sounds: Normal breath sounds.   Cardiovascular:      Normal rate. Regular rhythm.   Skin:     General: Skin is cool.   Neurological:      Mental Status: Alert, oriented to person, place, and time and oriented to person, place and time.         Lab Results   Component Value Date     09/07/2018    K 4.0 09/07/2018     (H) 09/07/2018    CO2 24.7 09/07/2018    BUN 15 09/07/2018    CREATININE 0.91 09/07/2018    GLUCOSE 93 09/07/2018    CALCIUM 8.4 09/07/2018    AST 15 09/07/2018    ALT 17 09/07/2018    ALKPHOS 96 09/07/2018    LABIL2 1.6 05/05/2016     Lab Results   Component Value Date    CKTOTAL 70 08/31/2018     Lab Results   Component Value Date    WBC 6.98 09/07/2018    HGB 13.0 (L) 09/07/2018    HCT 38.7 (L) 09/07/2018     09/07/2018     Lab Results   Component Value Date    INR 1.14 (H) 08/30/2018     Lab Results   Component Value Date    MG 2.2 02/18/2014     Lab Results   Component Value Date    TSH 1.338 05/09/2016    CHLPL 139 02/19/2014    TRIG 101 08/31/2018    HDL 30 (L) 08/31/2018    LDL 48 08/31/2018      Lab Results   Component Value Date    BNP 43.0 09/07/2018       During this visit the following were done:  Labs Reviewed []    Labs Ordered []    Radiology Reports Reviewed []    Radiology Ordered []    PCP Records Reviewed []    Referring Provider Records Reviewed []    ER Records Reviewed []    Hospital Records Reviewed []    History Obtained From Family []    Radiology Images Reviewed []    Other Reviewed []    Records Requested []       Procedures    Assessment & Plan    Diagnosis Plan   1. Precordial pain  Holter Monitor - 72 Hour  Up To 15 Days    Basic Metabolic Panel    Lipid Panel   2. Palpitations  Holter Monitor - 72 Hour Up To 15 Days   3. ASCVD with 50% stenosis in RCA in 2013     4. Essential hypertension              Recommendations:  1. Abnormal stress test  1. I will begin with GDMT patient currently not on ACE/ARB denies any known issues with these in the past or allergies.  I will start losartan 25 mg daily  2. Check BMP and lipid panel in 1 week.  3. Continue aspirin, Lipitor, metoprolol.  4. We will try to have patient see Dr. Ga at next visit given his known 50% stenosis in the RCA 8 years ago may need left heart catheterization if chest pains persist.  2. Palpitations  1. Patient chest pains seem to be related to palpitations I will order Holter monitor to rule out dysrhythmias.      No follow-ups on file.    As always, I appreciate very much the opportunity to participate in the cardiovascular care of your patients.      With Best Regards,    Danny Oneal PA-C

## 2022-06-20 ENCOUNTER — TELEPHONE (OUTPATIENT)
Dept: PULMONOLOGY | Facility: CLINIC | Age: 72
End: 2022-06-20

## 2022-06-20 ENCOUNTER — TREATMENT (OUTPATIENT)
Dept: CARDIOLOGY | Facility: CLINIC | Age: 72
End: 2022-06-20

## 2022-06-20 DIAGNOSIS — R07.2 PRECORDIAL PAIN: ICD-10-CM

## 2022-06-20 DIAGNOSIS — R00.2 PALPITATIONS: ICD-10-CM

## 2022-06-20 PROCEDURE — 93248 EXT ECG>7D<15D REV&INTERPJ: CPT | Performed by: INTERNAL MEDICINE

## 2022-06-28 ENCOUNTER — TELEPHONE (OUTPATIENT)
Dept: SURGERY | Facility: CLINIC | Age: 72
End: 2022-06-28

## 2022-06-28 ENCOUNTER — OFFICE VISIT (OUTPATIENT)
Dept: SURGERY | Facility: CLINIC | Age: 72
End: 2022-06-28

## 2022-06-28 VITALS
HEIGHT: 71 IN | SYSTOLIC BLOOD PRESSURE: 140 MMHG | DIASTOLIC BLOOD PRESSURE: 80 MMHG | BODY MASS INDEX: 30.38 KG/M2 | WEIGHT: 217 LBS

## 2022-06-28 DIAGNOSIS — Z86.010 HISTORY OF COLON POLYPS: ICD-10-CM

## 2022-06-28 DIAGNOSIS — Z72.0 TOBACCO ABUSE: Primary | ICD-10-CM

## 2022-06-28 DIAGNOSIS — K22.719 BARRETT'S ESOPHAGUS WITH DYSPLASIA: ICD-10-CM

## 2022-06-28 DIAGNOSIS — M62.08 DIASTASIS RECTI: ICD-10-CM

## 2022-06-28 DIAGNOSIS — K42.9 UMBILICAL HERNIA WITHOUT OBSTRUCTION AND WITHOUT GANGRENE: ICD-10-CM

## 2022-06-28 PROBLEM — Z86.0100 HISTORY OF COLON POLYPS: Status: ACTIVE | Noted: 2022-06-28

## 2022-06-28 PROCEDURE — 99204 OFFICE O/P NEW MOD 45 MIN: CPT | Performed by: SURGERY

## 2022-06-28 RX ORDER — SODIUM, POTASSIUM,MAG SULFATES 17.5-3.13G
SOLUTION, RECONSTITUTED, ORAL ORAL
Qty: 175 ML | Refills: 0 | Status: SHIPPED | OUTPATIENT
Start: 2022-06-28 | End: 2022-07-11

## 2022-06-28 NOTE — PROGRESS NOTES
Subjective   Bryson Baker is a 71 y.o. male.     Chief Complaint: hernia    History of Present Illness He is a obese smoker who has had a bulge in the epigastrium for several years that is getting larger. He also has some left sided abdominal pains and has had Ryan's esophagus in the past as well as colon polyps. He is due to get a colonoscopy follow up.     The following portions of the patient's history were reviewed and updated as appropriate: current medications, past family history, past medical history, past social history, past surgical history and problem list.    Review of Systems   Constitutional: Negative for activity change, appetite change, chills, fever and unexpected weight change.   HENT: Negative for congestion, facial swelling and sore throat.    Eyes: Negative for photophobia and visual disturbance.   Respiratory: Negative for chest tightness, shortness of breath and wheezing.    Cardiovascular: Negative for chest pain, palpitations and leg swelling.   Gastrointestinal: Positive for abdominal pain. Negative for abdominal distention, anal bleeding, blood in stool, constipation, diarrhea, nausea, rectal pain and vomiting.   Endocrine: Negative for cold intolerance, heat intolerance, polydipsia and polyuria.   Genitourinary: Negative for difficulty urinating, dysuria, flank pain and urgency.   Musculoskeletal: Negative for back pain and myalgias.   Skin: Negative for rash and wound.   Allergic/Immunologic: Negative for immunocompromised state.   Neurological: Negative for dizziness, seizures, syncope, light-headedness, numbness and headaches.   Hematological: Negative for adenopathy. Does not bruise/bleed easily.   Psychiatric/Behavioral: Negative for behavioral problems and confusion. The patient is not nervous/anxious.        Objective   Physical Exam  Vitals reviewed.   Constitutional:       General: He is not in acute distress.     Appearance: He is well-developed. He is not ill-appearing.        HENT:      Head: Normocephalic. No laceration. Hair is normal.      Right Ear: Hearing and ear canal normal.      Left Ear: Hearing and ear canal normal.      Nose: Nose normal.      Right Sinus: No maxillary sinus tenderness or frontal sinus tenderness.      Left Sinus: No maxillary sinus tenderness or frontal sinus tenderness.   Eyes:      General: Lids are normal.      Conjunctiva/sclera: Conjunctivae normal.      Pupils: Pupils are equal, round, and reactive to light.   Neck:      Thyroid: No thyroid mass or thyromegaly.      Vascular: No JVD.      Trachea: No tracheal tenderness or tracheal deviation.   Cardiovascular:      Rate and Rhythm: Normal rate and regular rhythm.      Heart sounds: No murmur heard.    No gallop.   Pulmonary:      Effort: Pulmonary effort is normal.      Breath sounds: Normal breath sounds. No stridor. No wheezing.   Chest:      Chest wall: No tenderness.   Breasts:      Right: No supraclavicular adenopathy.      Left: No supraclavicular adenopathy.       Abdominal:      General: Bowel sounds are normal. There is no distension.      Palpations: Abdomen is soft. There is no mass.      Tenderness: There is no abdominal tenderness. There is no guarding or rebound.      Hernia: A hernia is present.   Musculoskeletal:         General: No deformity.      Cervical back: Normal range of motion.   Lymphadenopathy:      Cervical: No cervical adenopathy.      Upper Body:      Right upper body: No supraclavicular adenopathy.      Left upper body: No supraclavicular adenopathy.   Skin:     General: Skin is warm and dry.      Coloration: Skin is not pale.      Findings: No erythema or rash.   Neurological:      Mental Status: He is alert and oriented to person, place, and time.      Motor: No abnormal muscle tone.   Psychiatric:         Behavior: Behavior normal.         Thought Content: Thought content normal.         Past Medical History:   Diagnosis Date   • Anxiety    • Arthritis    •  Asthma    • Baritosis ([facility])    • COPD (chronic obstructive pulmonary disease) (Formerly [facility])    • Coronary artery disease    • Gastritis    • GERD (gastroesophageal reflux disease)    • High cholesterol    • Hypertension    • Sleep apnea    • Sleep apnea        Family History   Problem Relation Age of Onset   • Cancer Mother    • Heart disease Mother    • Cancer Father    • Cancer Sister    • Cancer Brother        Social History     Tobacco Use   • Smoking status: Current Every Day Smoker     Packs/day: 1.00     Types: Cigarettes   • Smokeless tobacco: Never Used   • Tobacco comment: had quit but started back today 10/23/18   Vaping Use   • Vaping Use: Never used   Substance Use Topics   • Alcohol use: No   • Drug use: No       Past Surgical History:   Procedure Laterality Date   • CARDIAC CATHETERIZATION     • COLONOSCOPY W/ POLYPECTOMY     • FINGER SURGERY         Current Outpatient Medications   Medication Instructions   • albuterol (PROVENTIL HFA;VENTOLIN HFA) 108 (90 Base) MCG/ACT inhaler 2 puffs, Inhalation, Every 4 Hours PRN   • aspirin 81 mg, Oral, Daily   • atorvastatin (LIPITOR) 40 mg, Oral, Daily   • betamethasone dipropionate (DIPROLENE) 0.05 % ointment 1 application, Topical, Daily, Prior to [facility] Admission, Patient was on: Applies to legs for rash    • bisacodyl (DULCOLAX) 10 mg, Oral, 2 Times Daily   • budesonide-formoterol (SYMBICORT) 160-4.5 MCG/ACT inhaler 2 puffs, Inhalation, 2 Times Daily - RT   • cetirizine (ZYRTEC) 10 mg, Oral, Daily   • clobetasol (TEMOVATE) 0.05 % cream 1 application, Topical, 2 Times Daily PRN, Prior to [facility] Admission, Patient was on: applies to legs    • diphenhydrAMINE (BENADRYL) 50 mg, Oral, Every 6 Hours PRN   • gabapentin (NEURONTIN) 400 mg, Oral, 3 Times Daily   • HYDROcodone-acetaminophen (NORCO)  MG per tablet 1 tablet, Oral, Every 6 Hours PRN   • losartan (COZAAR) 25 mg, Oral, Daily   • metoprolol tartrate (LOPRESSOR) 25 MG tablet Take ½ tablet by mouth Every 12  (Twelve) Hours.   • nicotine (Nicoderm CQ) 14 MG/24HR patch 1 patch, Transdermal, Every 24 Hours   • nicotine (Nicoderm CQ) 21 MG/24HR patch 1 patch, Transdermal, Every 24 Hours   • nitroglycerin (NITROSTAT) 0.4 mg, Sublingual, Every 5 Minutes PRN   • pantoprazole (PROTONIX) 40 mg, Oral, Daily   • polyethylene glycol (MIRALAX) packet 17 g, Oral, Daily   • ranolazine (RANEXA) 500 mg, Oral, 2 Times Daily   • tamsulosin (FLOMAX) 0.4 MG capsule 24 hr capsule 1 capsule, Oral, Nightly   • tiotropium bromide monohydrate (SPIRIVA RESPIMAT) 2.5 MCG/ACT aerosol solution inhaler 1 puff, Inhalation, Daily - RT         Assessment & Plan   Diagnoses and all orders for this visit:    1. Tobacco abuse (Primary)    2. Kumar's esophagus with dysplasia    3. History of colon polyps    4. Diastasis recti    5. Umbilical hernia without obstruction and without gangrene    The diastasis does not need surgery and the umbilical hernia is small and assymptomatic now. He needs a colonoscopy and EGD given his history. Also strengthening exercises would help the diastasis as well as stopping smoking and losing weight.

## 2022-06-28 NOTE — TELEPHONE ENCOUNTER
Patient did not get [facility] for 06/28/22 appointment. He stated wanted billed to his [employer] for his appointment with Dr. Ramirez.

## 2022-07-01 ENCOUNTER — TELEPHONE (OUTPATIENT)
Dept: SURGERY | Facility: CLINIC | Age: 72
End: 2022-07-01

## 2022-07-01 DIAGNOSIS — Z20.822 ENCOUNTER FOR PREPROCEDURE SCREENING LABORATORY TESTING FOR COVID-19: Primary | ICD-10-CM

## 2022-07-01 DIAGNOSIS — Z01.812 ENCOUNTER FOR PREPROCEDURE SCREENING LABORATORY TESTING FOR COVID-19: Primary | ICD-10-CM

## 2022-07-01 NOTE — TELEPHONE ENCOUNTER
Patient aware of Covid test (mouth not nose)  Surgery info given to patient and he understands clear liquid diet, prep, and NPO.  Patient has stopped his aspirin

## 2022-07-06 ENCOUNTER — LAB (OUTPATIENT)
Dept: LAB | Facility: HOSPITAL | Age: 72
End: 2022-07-06

## 2022-07-06 DIAGNOSIS — Z20.822 ENCOUNTER FOR PREPROCEDURE SCREENING LABORATORY TESTING FOR COVID-19: ICD-10-CM

## 2022-07-06 DIAGNOSIS — Z01.812 ENCOUNTER FOR PREPROCEDURE SCREENING LABORATORY TESTING FOR COVID-19: ICD-10-CM

## 2022-07-06 DIAGNOSIS — Z01.818 OTHER SPECIFIED PRE-OPERATIVE EXAMINATION: Primary | ICD-10-CM

## 2022-07-06 PROCEDURE — U0004 COV-19 TEST NON-CDC HGH THRU: HCPCS

## 2022-07-06 PROCEDURE — C9803 HOPD COVID-19 SPEC COLLECT: HCPCS

## 2022-07-07 LAB — SARS-COV-2 RNA PNL SPEC NAA+PROBE: NOT DETECTED

## 2022-07-08 ENCOUNTER — ANESTHESIA EVENT (OUTPATIENT)
Dept: PERIOP | Facility: HOSPITAL | Age: 72
End: 2022-07-08

## 2022-07-08 ENCOUNTER — HOSPITAL ENCOUNTER (OUTPATIENT)
Facility: HOSPITAL | Age: 72
Setting detail: HOSPITAL OUTPATIENT SURGERY
Discharge: HOME | End: 2022-07-08
Attending: SURGERY | Admitting: SURGERY

## 2022-07-08 ENCOUNTER — ANESTHESIA (OUTPATIENT)
Dept: PERIOP | Facility: HOSPITAL | Age: 72
End: 2022-07-08

## 2022-07-08 VITALS
TEMPERATURE: 98 F | HEIGHT: 71 IN | RESPIRATION RATE: 20 BRPM | WEIGHT: 218 LBS | HEART RATE: 55 BPM | SYSTOLIC BLOOD PRESSURE: 127 MMHG | BODY MASS INDEX: 30.52 KG/M2 | DIASTOLIC BLOOD PRESSURE: 78 MMHG | OXYGEN SATURATION: 95 %

## 2022-07-08 DIAGNOSIS — M62.08 DIASTASIS RECTI: ICD-10-CM

## 2022-07-08 DIAGNOSIS — K42.9 UMBILICAL HERNIA WITHOUT OBSTRUCTION AND WITHOUT GANGRENE: ICD-10-CM

## 2022-07-08 PROCEDURE — 88305 TISSUE EXAM BY PATHOLOGIST: CPT

## 2022-07-08 PROCEDURE — 25010000002 PROPOFOL 10 MG/ML EMULSION: Performed by: NURSE ANESTHETIST, CERTIFIED REGISTERED

## 2022-07-08 PROCEDURE — 94799 UNLISTED PULMONARY SVC/PX: CPT

## 2022-07-08 PROCEDURE — 94640 AIRWAY INHALATION TREATMENT: CPT

## 2022-07-08 PROCEDURE — 43239 EGD BIOPSY SINGLE/MULTIPLE: CPT | Performed by: SURGERY

## 2022-07-08 PROCEDURE — 45380 COLONOSCOPY AND BIOPSY: CPT | Performed by: SURGERY

## 2022-07-08 PROCEDURE — 87081 CULTURE SCREEN ONLY: CPT | Performed by: SURGERY

## 2022-07-08 RX ORDER — FENTANYL CITRATE 50 UG/ML
50 INJECTION, SOLUTION INTRAMUSCULAR; INTRAVENOUS
Status: DISCONTINUED | OUTPATIENT
Start: 2022-07-08 | End: 2022-07-08 | Stop reason: HOSPADM

## 2022-07-08 RX ORDER — ONDANSETRON 2 MG/ML
4 INJECTION INTRAMUSCULAR; INTRAVENOUS AS NEEDED
Status: DISCONTINUED | OUTPATIENT
Start: 2022-07-08 | End: 2022-07-08 | Stop reason: HOSPADM

## 2022-07-08 RX ORDER — MIDAZOLAM HYDROCHLORIDE 1 MG/ML
0.5 INJECTION INTRAMUSCULAR; INTRAVENOUS
Status: DISCONTINUED | OUTPATIENT
Start: 2022-07-08 | End: 2022-07-08 | Stop reason: HOSPADM

## 2022-07-08 RX ORDER — MEPERIDINE HYDROCHLORIDE 25 MG/ML
12.5 INJECTION INTRAMUSCULAR; INTRAVENOUS; SUBCUTANEOUS
Status: DISCONTINUED | OUTPATIENT
Start: 2022-07-08 | End: 2022-07-08 | Stop reason: HOSPADM

## 2022-07-08 RX ORDER — KETOROLAC TROMETHAMINE 30 MG/ML
15 INJECTION, SOLUTION INTRAMUSCULAR; INTRAVENOUS EVERY 6 HOURS PRN
Status: DISCONTINUED | OUTPATIENT
Start: 2022-07-08 | End: 2022-07-08 | Stop reason: HOSPADM

## 2022-07-08 RX ORDER — SODIUM CHLORIDE 0.9 % (FLUSH) 0.9 %
10 SYRINGE (ML) INJECTION EVERY 12 HOURS SCHEDULED
Status: DISCONTINUED | OUTPATIENT
Start: 2022-07-08 | End: 2022-07-08 | Stop reason: HOSPADM

## 2022-07-08 RX ORDER — SODIUM CHLORIDE, SODIUM LACTATE, POTASSIUM CHLORIDE, CALCIUM CHLORIDE 600; 310; 30; 20 MG/100ML; MG/100ML; MG/100ML; MG/100ML
125 INJECTION, SOLUTION INTRAVENOUS ONCE
Status: COMPLETED | OUTPATIENT
Start: 2022-07-08 | End: 2022-07-08

## 2022-07-08 RX ORDER — SODIUM CHLORIDE 0.9 % (FLUSH) 0.9 %
10 SYRINGE (ML) INJECTION AS NEEDED
Status: DISCONTINUED | OUTPATIENT
Start: 2022-07-08 | End: 2022-07-08 | Stop reason: HOSPADM

## 2022-07-08 RX ORDER — OXYCODONE HYDROCHLORIDE AND ACETAMINOPHEN 5; 325 MG/1; MG/1
1 TABLET ORAL ONCE AS NEEDED
Status: DISCONTINUED | OUTPATIENT
Start: 2022-07-08 | End: 2022-07-08 | Stop reason: HOSPADM

## 2022-07-08 RX ORDER — IPRATROPIUM BROMIDE AND ALBUTEROL SULFATE 2.5; .5 MG/3ML; MG/3ML
3 SOLUTION RESPIRATORY (INHALATION) ONCE AS NEEDED
Status: COMPLETED | OUTPATIENT
Start: 2022-07-08 | End: 2022-07-08

## 2022-07-08 RX ORDER — PROPOFOL 10 MG/ML
VIAL (ML) INTRAVENOUS AS NEEDED
Status: DISCONTINUED | OUTPATIENT
Start: 2022-07-08 | End: 2022-07-08 | Stop reason: SURG

## 2022-07-08 RX ORDER — SODIUM CHLORIDE, SODIUM LACTATE, POTASSIUM CHLORIDE, CALCIUM CHLORIDE 600; 310; 30; 20 MG/100ML; MG/100ML; MG/100ML; MG/100ML
100 INJECTION, SOLUTION INTRAVENOUS ONCE AS NEEDED
Status: DISCONTINUED | OUTPATIENT
Start: 2022-07-08 | End: 2022-07-08 | Stop reason: HOSPADM

## 2022-07-08 RX ADMIN — IPRATROPIUM BROMIDE AND ALBUTEROL SULFATE 3 ML: .5; 3 SOLUTION RESPIRATORY (INHALATION) at 12:12

## 2022-07-08 RX ADMIN — PROPOFOL 80 MG: 10 INJECTION, EMULSION INTRAVENOUS at 11:35

## 2022-07-08 RX ADMIN — SODIUM CHLORIDE, POTASSIUM CHLORIDE, SODIUM LACTATE AND CALCIUM CHLORIDE: 600; 310; 30; 20 INJECTION, SOLUTION INTRAVENOUS at 11:32

## 2022-07-08 RX ADMIN — PROPOFOL 120 MCG/KG/MIN: 10 INJECTION, EMULSION INTRAVENOUS at 11:35

## 2022-07-08 NOTE — ANESTHESIA POSTPROCEDURE EVALUATION
Patient: Bryson Baker    Procedure Summary     Date: 07/08/22 Room / Location: [facility] OR  /  [facility] OR    Anesthesia Start: 1132 Anesthesia Stop: 1157    Procedures:       COLONOSCOPY (N/A )      ESOPHAGOGASTRODUODENOSCOPY (N/A Esophagus) Diagnosis:       Diastasis recti      Umbilical hernia without obstruction and without gangrene      (Diastasis recti [M62.08])      (Umbilical hernia without obstruction and without gangrene [K42.9])    Surgeons: Tico Ramirez MD Provider: Charbel Garnett DO    Anesthesia Type: general ASA Status: 3          Anesthesia Type: general    Vitals  Vitals Value Taken Time   /78 07/08/22 1230   Temp 98 °F (36.7 °C) 07/08/22 1230   Pulse 55 07/08/22 1230   Resp 20 07/08/22 1230   SpO2 95 % 07/08/22 1230           Post Anesthesia Care and Evaluation    Patient location during evaluation: PHASE II  Patient participation: complete - patient participated  Level of consciousness: awake and alert  Pain score: 1  Pain management: adequate    Airway patency: patent  Anesthetic complications: No anesthetic complications  PONV Status: controlled  Cardiovascular status: acceptable  Respiratory status: acceptable  Hydration status: acceptable

## 2022-07-08 NOTE — ANESTHESIA PREPROCEDURE EVALUATION
Anesthesia Evaluation     Patient summary reviewed and Nursing notes reviewed   no history of anesthetic complications:               Airway   Mallampati: II  TM distance: >3 FB  Neck ROM: limited  Possible difficult intubation  Dental - normal exam   (+) poor dentition    Pulmonary - normal exam   (+) a smoker Current Smoked day of surgery, COPD, asthma,shortness of breath, sleep apnea,   Cardiovascular - normal exam  Exercise tolerance: good (4-7 METS)    NYHA Classification: II    (+) hypertension, CAD, GAYTAN, PVD, hyperlipidemia,     ROS comment: · A pharmacological stress test was performed using regadenoson without low-level exercise.  · Findings consistent with a normal ECG stress test.  · Myocardial perfusion imaging indicates a small-sized, mild degree of ischemia located in the inferior wall.  · Left ventricular ejection fraction is hyperdynamic (Calculated EF > 70%). .  · Impressions are consistent with an low to intermediate risk study.         Neuro/Psych  (+) psychiatric history,    GI/Hepatic/Renal/Endo    (+)  GERD,      Musculoskeletal     Abdominal  - normal exam    Bowel sounds: normal.   Substance History - negative use     OB/GYN negative ob/gyn ROS         Other   arthritis,                    Anesthesia Plan    ASA 3     general     intravenous induction     Anesthetic plan, risks, benefits, and alternatives have been provided, discussed and informed consent has been obtained with: patient.        CODE STATUS:

## 2022-07-08 NOTE — OP NOTE
COLONOSCOPY, ESOPHAGOGASTRODUODENOSCOPY  Procedure Note    Bryson Baker  7/8/2022    Pre-op Diagnosis:   Diastasis recti [M62.08]  Umbilical hernia without obstruction and without gangrene [K42.9]    Post-op Diagnosis: mild gastritis, diverticulosis, polyps, hemorrhoids         Procedure(s):  COLONOSCOPY  ESOPHAGOGASTRODUODENOSCOPY    Surgeon(s):  Tico Ramirez MD    Anesthesia: General    Staff:   Circulator: Alecia Arizmendi RN  Endo Technician: Crispin Sousa; Shankar Mittal    Estimated Blood Loss: none    Specimens:                Order Name Source Comment Collection Info Order Time   UREASE FOR H PYLORI Gastric, Antrum  Collected By: Tico Ramirez MD 7/8/2022 11:41 AM     Release to patient   Immediate        TISSUE EXAM, [facility] ([facility]) Large Intestine, Left / Descending Colon  Collected By: Tico Ramirez MD 7/8/2022 11:49 AM     How many specimens?   1          Release to patient   Immediate              Drains: * No LDAs found *    Procedure: The scope advanced from the mouth to the duodenum. It was unremarkable. The stomach had mild irritation and a biopsy was done for urease in the antrum. The esophagus was unremarkable.   The scope advanced from the rectum to the cecum . The ileocecal valve was unremarkable. There was some stool that had to be irrigated and suctioned. There was no inflammation in the colon, but he had a couple of small polyps int he descending colon removed with biopsy forceps. There were numerous large diverticuli in the descending colon and sigmoid but no inflammation. He does have some internal and external hemorrhoids.     Findings:      Complications: none   Grafts / Implants N/A    Tico Ramirez MD     Date: 7/8/2022  Time: 11:57 EDT

## 2022-07-09 LAB — UREASE TISS QL: POSITIVE

## 2022-07-11 ENCOUNTER — TELEPHONE (OUTPATIENT)
Dept: SURGERY | Facility: CLINIC | Age: 72
End: 2022-07-11

## 2022-07-11 DIAGNOSIS — A04.8 BACTERIAL INFECTION DUE TO H. PYLORI: Primary | ICD-10-CM

## 2022-07-11 RX ORDER — BISMUTH SUBSALICYLATE 262 MG/1
524 TABLET, CHEWABLE ORAL
Qty: 80 TABLET | Refills: 0 | Status: SHIPPED | OUTPATIENT
Start: 2022-07-11 | End: 2022-07-12 | Stop reason: SDUPTHER

## 2022-07-11 RX ORDER — DOXYCYCLINE HYCLATE 100 MG/1
100 TABLET, DELAYED RELEASE ORAL 2 TIMES DAILY
Qty: 20 TABLET | Refills: 0 | Status: SHIPPED | OUTPATIENT
Start: 2022-07-11 | End: 2022-07-12 | Stop reason: SDUPTHER

## 2022-07-11 RX ORDER — METRONIDAZOLE 500 MG/1
500 TABLET ORAL 2 TIMES DAILY
Qty: 20 TABLET | Refills: 0 | Status: SHIPPED | OUTPATIENT
Start: 2022-07-11 | End: 2022-07-12

## 2022-07-11 NOTE — TELEPHONE ENCOUNTER
The following RX were sent in to the patient's pharmacy per Dr. Ramirez to treat his positive H.Pylori from his recent EGD. Patient is already on Protonix per his chart, so no prevacid was called in.    doxycycline (DORYX) 100 MG enteric coated tablet [[id number]]     Order Details  Dose: 100 mg Route: Oral Frequency: 2 Times Daily   Dispense Quantity: 20 tablet Refills: 0          Sig: Take 1 tablet by mouth 2 (Two) Times a Day for 10 days.     metroNIDAZOLE (Flagyl) 500 MG tablet [[id number]]     Order Details  Dose: 500 mg Route: Oral Frequency: 2 Times Daily   Dispense Quantity: 20 tablet Refills: 0          Sig: Take 1 tablet by mouth 2 (Two) Times a Day for 10 days.     bismuth subsalicylate (Pepto-Bismol) 262 MG chewable tablet [[id number]]     Order Details  Dose: 524 mg Route: Oral Frequency: 4 Times Daily Before Meals & Nightly   Dispense Quantity: 80 tablet Refills: 0          Sig: Chew 2 tablets 4 (Four) Times a Day Before Meals & at Bedtime for 10 days.     Patient aware

## 2022-07-12 ENCOUNTER — OFFICE VISIT (OUTPATIENT)
Dept: FAMILY MEDICINE CLINIC | Facility: CLINIC | Age: 72
End: 2022-07-12

## 2022-07-12 VITALS
TEMPERATURE: 97.3 F | HEIGHT: 71 IN | WEIGHT: 211 LBS | SYSTOLIC BLOOD PRESSURE: 150 MMHG | OXYGEN SATURATION: 99 % | DIASTOLIC BLOOD PRESSURE: 94 MMHG | BODY MASS INDEX: 29.54 KG/M2 | HEART RATE: 75 BPM

## 2022-07-12 DIAGNOSIS — J44.9 COPD MIXED TYPE: Chronic | ICD-10-CM

## 2022-07-12 DIAGNOSIS — A04.8 BACTERIAL INFECTION DUE TO H. PYLORI: Primary | ICD-10-CM

## 2022-07-12 DIAGNOSIS — E55.9 VITAMIN D DEFICIENCY: Chronic | ICD-10-CM

## 2022-07-12 DIAGNOSIS — I10 ESSENTIAL HYPERTENSION: Chronic | ICD-10-CM

## 2022-07-12 DIAGNOSIS — M54.16 LUMBAR RADICULOPATHY: Chronic | ICD-10-CM

## 2022-07-12 DIAGNOSIS — G62.9 PERIPHERAL POLYNEUROPATHY: Chronic | ICD-10-CM

## 2022-07-12 DIAGNOSIS — N40.1 BENIGN PROSTATIC HYPERPLASIA WITH URINARY HESITANCY: Chronic | ICD-10-CM

## 2022-07-12 DIAGNOSIS — K59.03 DRUG-INDUCED CONSTIPATION: Chronic | ICD-10-CM

## 2022-07-12 DIAGNOSIS — I25.10 ASCVD (ARTERIOSCLEROTIC CARDIOVASCULAR DISEASE): ICD-10-CM

## 2022-07-12 DIAGNOSIS — R73.9 HYPERGLYCEMIA: Chronic | ICD-10-CM

## 2022-07-12 DIAGNOSIS — I73.9 PVD (PERIPHERAL VASCULAR DISEASE): Chronic | ICD-10-CM

## 2022-07-12 DIAGNOSIS — J30.1 NON-SEASONAL ALLERGIC RHINITIS DUE TO POLLEN: Chronic | ICD-10-CM

## 2022-07-12 DIAGNOSIS — M51.36 DDD (DEGENERATIVE DISC DISEASE), LUMBAR: Chronic | ICD-10-CM

## 2022-07-12 DIAGNOSIS — E78.2 MIXED HYPERLIPIDEMIA: Chronic | ICD-10-CM

## 2022-07-12 DIAGNOSIS — K22.719 BARRETT'S ESOPHAGUS WITH DYSPLASIA: Chronic | ICD-10-CM

## 2022-07-12 DIAGNOSIS — R39.11 BENIGN PROSTATIC HYPERPLASIA WITH URINARY HESITANCY: Chronic | ICD-10-CM

## 2022-07-12 LAB — REF LAB TEST METHOD: NORMAL

## 2022-07-12 PROCEDURE — 99204 OFFICE O/P NEW MOD 45 MIN: CPT | Performed by: PHYSICIAN ASSISTANT

## 2022-07-12 RX ORDER — BISMUTH SUBSALICYLATE 262 MG/1
524 TABLET, CHEWABLE ORAL
Qty: 80 TABLET | Refills: 0 | Status: SHIPPED | OUTPATIENT
Start: 2022-07-12 | End: 2022-07-22

## 2022-07-12 RX ORDER — METRONIDAZOLE 500 MG/1
500 TABLET ORAL 2 TIMES DAILY
Qty: 20 TABLET | Refills: 0 | Status: SHIPPED | OUTPATIENT
Start: 2022-07-12 | End: 2022-07-22

## 2022-07-12 RX ORDER — DOXYCYCLINE HYCLATE 100 MG/1
100 TABLET, DELAYED RELEASE ORAL 2 TIMES DAILY
Qty: 20 TABLET | Refills: 0 | Status: SHIPPED | OUTPATIENT
Start: 2022-07-12 | End: 2022-07-22

## 2022-07-12 RX ORDER — DIPHENHYDRAMINE HCL 50 MG
1 CAPSULE ORAL
Status: ON HOLD | COMMUNITY
End: 2022-11-30

## 2022-07-13 NOTE — PATIENT INSTRUCTIONS
"https://www.diabeteseducator.org/docs/default-source/living-with-diabetes/conquering-the-grocery-store-v1.pdf?sfvrsn=4\">   Carbohydrate Counting for Diabetes Mellitus, Adult  Carbohydrate counting is a method of keeping track of how many carbohydrates you eat. Eating carbohydrates naturally increases the amount of sugar (glucose) in the blood. Counting how many carbohydrates you eat improves your blood glucose control, which helps you manage your diabetes.  It is important to know how many carbohydrates you can safely have in each meal. This is different for every person. A dietitian can help you make a meal plan and calculate how many carbohydrates you should have at each meal and snack.  What foods contain carbohydrates?  Carbohydrates are found in the following foods:  Grains, such as breads and cereals.  Dried beans and soy products.  Starchy vegetables, such as potatoes, peas, and corn.  Fruit and fruit juices.  Milk and yogurt.  Sweets and snack foods, such as cake, cookies, candy, chips, and soft drinks.  How do I count carbohydrates in foods?  There are two ways to count carbohydrates in food. You can read food labels or learn standard serving sizes of foods. You can use either of the methods or a combination of both.  Using the Nutrition Facts label  The Nutrition Facts list is included on the labels of almost all packaged foods and beverages in the [place] It includes:  The serving size.  Information about nutrients in each serving, including the grams (g) of carbohydrate per serving.  To use the Nutrition Facts:  Decide how many servings you will have.  Multiply the number of servings by the number of carbohydrates per serving.  The resulting number is the total amount of carbohydrates that you will be having.  Learning the standard serving sizes of foods  When you eat carbohydrate foods that are not packaged or do not include Nutrition Facts on the label, you need to measure the servings in order to count " the amount of carbohydrates.  Measure the foods that you will eat with a food scale or measuring cup, if needed.  Decide how many standard-size servings you will eat.  Multiply the number of servings by 15. For foods that contain carbohydrates, one serving equals 15 g of carbohydrates.  For example, if you eat 2 cups or 10 oz (300 g) of strawberries, you will have eaten 2 servings and 30 g of carbohydrates (2 servings x 15 g = 30 g).  For foods that have more than one food mixed, such as soups and casseroles, you must count the carbohydrates in each food that is included.  The following list contains standard serving sizes of common carbohydrate-rich foods. Each of these servings has about 15 g of carbohydrates:  1 slice of bread.  1 six-inch (15 cm) tortilla.  ? cup or 2 oz (53 g) cooked rice or pasta.  ½ cup or 3 oz (85 g) cooked or canned, drained and rinsed beans or lentils.  ½ cup or 3 oz (85 g) starchy vegetable, such as peas, corn, or squash.  ½ cup or 4 oz (120 g) hot cereal.  ½ cup or 3 oz (85 g) boiled or mashed potatoes, or ¼ or 3 oz (85 g) of a large baked potato.  ½ cup or 4 fl oz (118 mL) fruit juice.  1 cup or 8 fl oz (237 mL) milk.  1 small or 4 oz (106 g) apple.  ½ or 2 oz (63 g) of a medium banana.  1 cup or 5 oz (150 g) strawberries.  3 cups or 1 oz (24 g) popped popcorn.  What is an example of carbohydrate counting?  To calculate the number of carbohydrates in this sample meal, follow the steps shown below.  Sample meal  3 oz (85 g) chicken breast.  ? cup or 4 oz (106 g) brown rice.  ½ cup or 3 oz (85 g) corn.  1 cup or 8 fl oz (237 mL) milk.  1 cup or 5 oz (150 g) strawberries with sugar-free whipped topping.  Carbohydrate calculation  Identify the foods that contain carbohydrates:  Rice.  Corn.  Milk.  Strawberries.  Calculate how many servings you have of each food:  2 servings rice.  1 serving corn.  1 serving milk.  1 serving strawberries.  Multiply each number of servings by 15   servings rice x 15 g = 30 g.  1 serving corn x 15 g = 15 g.  1 serving milk x 15 g = 15 g.  1 serving strawberries x 15 g = 15 g.  Add together all of the amounts to find the total grams of carbohydrates eaten:  30 g + 15 g + 15 g + 15 g = 75 g of carbohydrates total.  What are tips for following this plan?  Shopping  Develop a meal plan and then make a shopping list.  Buy fresh and frozen vegetables, fresh and frozen fruit, dairy, eggs, beans, lentils, and whole grains.  Look at food labels. Choose foods that have more fiber and less sugar.  Avoid processed foods and foods with added sugars.  Meal planning  Aim to have the same amount of carbohydrates at each meal and for each snack time.  Plan to have regular, balanced meals and snacks.  Where to find more information  American Diabetes Association: www.diabetes.org  Centers for Disease Control and Prevention: www.cdc.gov  Summary  Carbohydrate counting is a method of keeping track of how many carbohydrates you eat.  Eating carbohydrates naturally increases the amount of sugar (glucose) in the blood.  Counting how many carbohydrates you eat improves your blood glucose control, which helps you manage your diabetes.  A dietitian can help you make a meal plan and calculate how many carbohydrates you should have at each meal and snack.  This information is not intended to replace advice given to you by your health care provider. Make sure you discuss any questions you have with your health care provider.  Document Revised: 12/17/2020 Document Reviewed: 12/18/2020  [employer] Patient Education © 2021 [employer]  Fat and Cholesterol Restricted Eating Plan  Getting too much fat and cholesterol in your diet may cause health problems. Choosing the right foods helps keep your fat and cholesterol at normal levels. This can keep you from getting certain diseases.  Your doctor may recommend an eating plan that includes:  Total fat: ______% or less of total calories a  "day.  Saturated fat: ______% or less of total calories a day.  Cholesterol: less than _________mg a day.  Fiber: ______g a day.  What are tips for following this plan?  Meal planning  At meals, divide your plate into four equal parts:  Fill one-half of your plate with vegetables and green salads.  Fill one-fourth of your plate with whole grains.  Fill one-fourth of your plate with low-fat (lean) protein foods.  Eat fish that is high in omega-3 fats at least two times a week. This includes mackerel, tuna, sardines, and salmon.  Eat foods that are high in fiber, such as whole grains, beans, apples, broccoli, carrots, peas, and barley.  General tips    Work with your doctor to lose weight if you need to.  Avoid:  Foods with added sugar.  Fried foods.  Foods with partially hydrogenated oils.  Limit alcohol intake to no more than 1 drink a day for nonpregnant women and 2 drinks a day for men. One drink equals 12 oz of beer, 5 oz of wine, or 1½ oz of hard liquor.    Reading food labels  Check food labels for:  Trans fats.  Partially hydrogenated oils.  Saturated fat (g) in each serving.  Cholesterol (mg) in each serving.  Fiber (g) in each serving.  Choose foods with healthy fats, such as:  Monounsaturated fats.  Polyunsaturated fats.  Omega-3 fats.  Choose grain products that have whole grains. Look for the word \"whole\" as the first word in the ingredient list.  Cooking  Cook foods using low-fat methods. These include baking, boiling, grilling, and broiling.  Eat more home-cooked foods. Eat at restaurants and buffets less often.  Avoid cooking using saturated fats, such as butter, cream, palm oil, palm kernel oil, and coconut oil.  Recommended foods    Fruits  All fresh, canned (in natural juice), or frozen fruits.  Vegetables  Fresh or frozen vegetables (raw, steamed, roasted, or grilled). Green salads.  Grains  Whole grains, such as whole wheat or whole grain breads, crackers, cereals, and pasta. Unsweetened oatmeal, " bulgur, barley, quinoa, or brown rice. Corn or whole wheat flour tortillas.  Meats and other protein foods  Ground beef (85% or leaner), grass-fed beef, or beef trimmed of fat. Skinless chicken or turkey. Ground chicken or turkey. Pork trimmed of fat. All fish and seafood. Egg whites. Dried beans, peas, or lentils. Unsalted nuts or seeds. Unsalted canned beans. Nut butters without added sugar or oil.  Dairy  Low-fat or nonfat dairy products, such as skim or 1% milk, 2% or reduced-fat cheeses, low-fat and fat-free ricotta or cottage cheese, or plain low-fat and nonfat yogurt.  Fats and oils  Tub margarine without trans fats. Light or reduced-fat mayonnaise and salad dressings. Avocado. Olive, canola, sesame, or safflower oils.  The items listed above may not be a complete list of foods and beverages you can eat. Contact a dietitian for more information.  Foods to avoid  Fruits  Canned fruit in heavy syrup. Fruit in cream or butter sauce. Fried fruit.  Vegetables  Vegetables cooked in cheese, cream, or butter sauce. Fried vegetables.  Grains  White bread. White pasta. White rice. Cornbread. Bagels, pastries, and croissants. Crackers and snack foods that contain trans fat and hydrogenated oils.  Meats and other protein foods  Fatty cuts of meat. Ribs, chicken wings, griffin, sausage, bologna, salami, chitterlings, fatback, hot dogs, bratwurst, and packaged lunch meats. Liver and organ meats. Whole eggs and egg yolks. Chicken and turkey with skin. Fried meat.  Dairy  Whole or 2% milk, cream, half-and-half, and cream cheese. Whole milk cheeses. Whole-fat or sweetened yogurt. Full-fat cheeses. Nondairy creamers and whipped toppings. Processed cheese, cheese spreads, and cheese curds.  Beverages  Alcohol. Sugar-sweetened drinks such as sodas, lemonade, and fruit drinks.  Fats and oils  Butter, stick margarine, lard, shortening, ghee, or griffin fat. Coconut, palm kernel, and palm oils.  Sweets and desserts  Corn syrup, sugars,  honey, and molasses. Candy. Jam and jelly. Syrup. Sweetened cereals. Cookies, pies, cakes, donuts, muffins, and ice cream.  The items listed above may not be a complete list of foods and beverages you should avoid. Contact a dietitian for more information.  Summary  Choosing the right foods helps keep your fat and cholesterol at normal levels. This can keep you from getting certain diseases.  At meals, fill one-half of your plate with vegetables and green salads.  Eat high-fiber foods, like whole grains, beans, apples, carrots, peas, and barley.  Limit added sugar, saturated fats, alcohol, and fried foods.  This information is not intended to replace advice given to you by your health care provider. Make sure you discuss any questions you have with your health care provider.  Document Revised: 04/21/2021 Document Reviewed: 04/21/2021  [employer] Patient Education © 2021 [employer]

## 2022-07-13 NOTE — PROGRESS NOTES
Subjective   Bryson Baker is a 71 y.o. male.       Chief Complaint -establish care and H. pylori    History of Present Illness -    ROS    He is here today to establish care as a new patient.  He was previously seen at the [facility] which is in [place].  Due to the long drive the patient is seeking medical care closer to his home.    H. pylori-  He complains of epigastric abdominal pain that he has had intermittently for several months.  He underwent endoscopy by EGD and colonoscopy by Dr. Ramirez on 7/8/2022.  Pathology did reveal positive H. pylori testing.    Hypertension-  Stable with losartan and metoprolol.  Patient states that he is having some abdominal discomfort today and the pain may be elevating blood pressure temporarily.    Hyperlipidemia-stable with atorvastatin and low-cholesterol diet    Kumar's esophagus-  Stable with pantoprazole and dietary modification.  [facility]    Emphysema- stable with Spiriva, Symbicort, and albuterol    Degenerative disc disease of lumbar spine-  He has history of degenerative disc disease of the lumbar spine with lumbar radiculopathy and peripheral neuropathy.  Pain does not radiate to the legs.  Patient states that he was a surgical candidate but has chosen not to have surgery at this time.  Some relief with gabapentin and [place] [facility]    Constipation-  Patient does have some drug-induced constipation with long-term use of hydrocodone.  Some relief with Dulcolax    Allergic rhinitis-stable with cetirizine    BPH-stable with Flomax    Peripheral vascular disease-stable with Ranexa and aspirin    Coronary artery disease-  Stable with risk factor modification including aspirin and atorvastatin.  Patient has a history of 50% stenosis in RCA in 2013.    Vitamin D deficiency-stable with vitamin D supplementation    Hyperglycemia-  Patient states he has had episodes of hyperglycemia and has a  "history of prediabetes.  This is due to be rechecked.        The following portions of the patient's history were reviewed and updated as appropriate: allergies, current medications, past family history, past medical history, past social history, past surgical history and problem list.    Review of Systems    Objective  Vital signs:  /94   Pulse 75   Temp 97.3 °F (36.3 °C) (Temporal)   Ht 180.3 cm (71\")   Wt 95.7 kg (211 lb)   SpO2 99%   BMI 29.43 kg/m²     Physical Exam  Vitals and nursing note reviewed.   Constitutional:       Appearance: Normal appearance. He is well-developed.   HENT:      Head: Normocephalic and atraumatic.      Right Ear: Tympanic membrane normal.      Left Ear: Tympanic membrane normal.      Nose: Nose normal.      Mouth/Throat:      Mouth: Mucous membranes are moist.      Pharynx: No oropharyngeal exudate or posterior oropharyngeal erythema.   Eyes:      Extraocular Movements: Extraocular movements intact.      Conjunctiva/sclera: Conjunctivae normal.   Neck:      Thyroid: No thyromegaly.      Trachea: No tracheal deviation.   Cardiovascular:      Rate and Rhythm: Normal rate and regular rhythm.      Heart sounds: Normal heart sounds. No murmur heard.  Pulmonary:      Effort: Pulmonary effort is normal. No respiratory distress.      Breath sounds: Normal breath sounds. No wheezing.   Abdominal:      General: Bowel sounds are normal.      Palpations: Abdomen is soft.      Tenderness: There is no abdominal tenderness. There is no guarding.   Musculoskeletal:         General: Tenderness present.      Cervical back: Normal range of motion and neck supple.      Right lower leg: No edema.      Left lower leg: No edema.      Comments: Diffuse tenderness noted to paraspinal lumbar musculature.  Decreased range of joint motion with lumbar flexion   Lymphadenopathy:      Cervical: No cervical adenopathy.   Skin:     General: Skin is warm and dry.      Findings: No rash.   Neurological:     "  General: No focal deficit present.      Mental Status: He is alert and oriented to person, place, and time.   Psychiatric:         Mood and Affect: Mood normal.         Behavior: Behavior normal.         Thought Content: Thought content normal.         The following data was reviewed by: ESTER Garner on 07/12/2022:    Data reviewed: EGD and colonoscopy reviewed       Assessment & Plan     Diagnoses and all orders for this visit:    1. Bacterial infection due to H. pylori (Primary)  Comments:  Advised to start therapy for H. pylori.  [facility] due to [facility].  Orders:  -     bismuth subsalicylate (Pepto-Bismol) 262 MG chewable tablet; Chew 2 tablets 4 (Four) Times a Day Before Meals & at Bedtime for 10 days.  Dispense: 80 tablet; Refill: 0  -     doxycycline (DORYX) 100 MG enteric coated tablet; Take 1 tablet by mouth 2 (Two) Times a Day for 10 days.  Dispense: 20 tablet; Refill: 0  -     metroNIDAZOLE (Flagyl) 500 MG tablet; Take 1 tablet by mouth 2 (Two) Times a Day for 10 days.  Dispense: 20 tablet; Refill: 0    2. Essential hypertension  Comments:  Continue to monitor as may be acutely elevated due to pain  Plan to treat underlying H. pylori  Continue losartan and metoprolol  Orders:  -     CBC & Differential; Future  -     Comprehensive Metabolic Panel; Future  -     TSH; Future  -     Hemoglobin A1c; Future    3. Mixed hyperlipidemia  Comments:  Advised low-cholesterol diet  Continue atorvastatin  Orders:  -     Lipid Panel; Future  -     Hemoglobin A1c; Future    4. Kumar's esophagus with dysplasia  Comments:  Continue pantoprazole  Advised to continue care regularly with GI  Orders:  -     CBC & Differential; Future    5. Emphysema  Comments:  Continue Symbicort, Spiriva and albuterol  Orders:  -     CBC & Differential; Future    6. Drug-induced constipation  Comments:  Advised a high-fiber diet  Continue Dulcolax  Orders:  -     Comprehensive Metabolic  Panel; Future    7. Non-seasonal allergic rhinitis due to pollen  Comments:  Continue cetirizine  Advised to avoid known environmental allergens when possible    8. Peripheral polyneuropathy  Comments:  Continue gabapentin as [facility] for lumbar radiculopathy and neuropathic pain    9. DDD (degenerative disc disease), lumbar  Comments:  Continue Norco and gabapentin as [facility]    10. Lumbar radiculopathy  Comments:  Continue gabapentin  Patient states he is not interested in surgical intervention at this time    11. Benign prostatic hyperplasia with urinary hesitancy  Comments:  Continue Flomax    12. PVD (peripheral vascular disease) (HCC)  Comments:  Continue aspirin and Ranexa    13. ASCVD with 50% stenosis in RCA in 2013    14. Vitamin D deficiency  Comments:  Recheck vitamin D for further evaluation  Orders:  -     Vitamin D 25 Hydroxy; Future    15. Hyperglycemia  Comments:  Prediabetes  Advised a low carbohydrate diabetic diet  Orders:  -     Hemoglobin A1c; Future            Patient was given instructions and counseling regarding his condition or for health maintenance advice. Please see specific information pulled into the AVS if appropriate      This document has been electronically signed by:  Rita Orr PA-C

## 2022-07-14 ENCOUNTER — PATIENT ROUNDING (BHMG ONLY) (OUTPATIENT)
Dept: FAMILY MEDICINE CLINIC | Facility: CLINIC | Age: 72
End: 2022-07-14

## 2022-07-14 NOTE — PROGRESS NOTES
July 14, 2022    Hello, may I speak with Bryson Baker?    My name is Sada Ching     I am  with [facility]  [address] [place] [id number] [phone].    Before we get started may I verify your date of birth? 1950    I am calling to officially welcome you to our practice and ask about your recent visit. Is this a good time to talk? yes    Tell me about your visit with us. What things went well?  The Dr listened to me and talked good to me. It was ok.       We're always looking for ways to make our patients' experiences even better. Do you have recommendations on ways we may improve?  no    Overall were you satisfied with your first visit to our practice? yes       I appreciate you taking the time to speak with me today. Is there anything else I can do for you? no      Thank you, and have a great day.

## 2022-07-18 ENCOUNTER — OFFICE VISIT (OUTPATIENT)
Dept: SURGERY | Facility: CLINIC | Age: 72
End: 2022-07-18

## 2022-07-18 VITALS — WEIGHT: 211 LBS | HEIGHT: 71 IN | BODY MASS INDEX: 29.54 KG/M2

## 2022-07-18 DIAGNOSIS — K21.9 GASTROESOPHAGEAL REFLUX DISEASE WITHOUT ESOPHAGITIS: ICD-10-CM

## 2022-07-18 DIAGNOSIS — K42.9 UMBILICAL HERNIA WITHOUT OBSTRUCTION AND WITHOUT GANGRENE: ICD-10-CM

## 2022-07-18 DIAGNOSIS — M62.08 DIASTASIS RECTI: Primary | ICD-10-CM

## 2022-07-18 PROBLEM — K22.719 BARRETT'S ESOPHAGUS WITH DYSPLASIA: Status: RESOLVED | Noted: 2018-12-04 | Resolved: 2022-07-18

## 2022-07-18 PROCEDURE — 99213 OFFICE O/P EST LOW 20 MIN: CPT | Performed by: SURGERY

## 2022-07-18 NOTE — PROGRESS NOTES
Subjective   Bryson Baker is a 71 y.o. male.     Chief Complaint: reflux, polyps    History of Present Illness His EGD did not show any Barretts, but his urease was positive. He is on meds for that and feels a little better. He is on antacids but still has some reflux. No abdominal symptoms. His polyps were small benign ones.     The following portions of the patient's history were reviewed and updated as appropriate: current medications, past family history, past medical history, past social history, past surgical history and problem list.    Review of Systems reflux    Objective   Physical Exam Abdomen is soft and not tender. He has diastasis in the epigastrium and a small umbilical hernia that is not tender.    Past Medical History:   Diagnosis Date   • Anxiety    • Arthritis    • Asthma    • Baritosis (HCC)    • COPD (chronic obstructive pulmonary disease) (HCC)    • Coronary artery disease    • Gastritis    • GERD (gastroesophageal reflux disease)    • High cholesterol    • Hypertension    • Sleep apnea    • Sleep apnea        Family History   Problem Relation Age of Onset   • Cancer Mother    • Heart disease Mother    • Cancer Father    • Cancer Sister    • Cancer Brother        Social History     Tobacco Use   • Smoking status: Current Every Day Smoker     Packs/day: 1.00     Years: 30.00     Pack years: 30.00     Types: Cigarettes   • Smokeless tobacco: Never Used   • Tobacco comment: had quit but started back today 10/23/18   Vaping Use   • Vaping Use: Never used   Substance Use Topics   • Alcohol use: No   • Drug use: No       Past Surgical History:   Procedure Laterality Date   • CARDIAC CATHETERIZATION     • COLONOSCOPY N/A 7/8/2022    Procedure: COLONOSCOPY;  Surgeon: Tico Ramirez MD;  Location: [facility] OR;  Service: Gastroenterology;  Laterality: N/A;   • COLONOSCOPY W/ POLYPECTOMY     • ENDOSCOPY N/A 7/8/2022    Procedure: ESOPHAGOGASTRODUODENOSCOPY;  Surgeon: Tico Ramirez MD;  Location:   [facility] OR;  Service: Gastroenterology;  Laterality: N/A;   • FINGER SURGERY         Current Outpatient Medications   Medication Instructions   • albuterol (PROVENTIL HFA;VENTOLIN HFA) 108 (90 Base) MCG/ACT inhaler 2 puffs, Inhalation, Every 4 Hours PRN   • aspirin 81 mg, Oral, Daily   • atorvastatin (LIPITOR) 40 mg, Oral, Daily   • betamethasone dipropionate (DIPROLENE) 0.05 % ointment 1 application, Topical, Daily, Prior to [facility] Admission, Patient was on: Applies to legs for rash    • bisacodyl (DULCOLAX) 10 mg, Oral, 2 Times Daily   • bismuth subsalicylate (PEPTO-BISMOL) 524 mg, Oral, 4 Times Daily Before Meals & Nightly   • budesonide-formoterol (SYMBICORT) 160-4.5 MCG/ACT inhaler 2 puffs, Inhalation, 2 Times Daily - RT   • cetirizine (ZYRTEC) 10 mg, Oral, Daily   • clobetasol (TEMOVATE) 0.05 % cream 1 application, Topical, 2 Times Daily PRN, Prior to [facility] Admission, Patient was on: applies to legs    • diphenhydrAMINE (BENADRYL) 50 MG capsule 1 capsule, Oral, Every 2 Hours   • doxycycline (DORYX) 100 mg, Oral, 2 Times Daily   • gabapentin (NEURONTIN) 400 mg, Oral, 3 Times Daily   • HYDROcodone-acetaminophen (NORCO)  MG per tablet 1 tablet, Oral, Every 6 Hours PRN   • losartan (COZAAR) 25 mg, Oral, Daily   • metoprolol tartrate (LOPRESSOR) 25 MG tablet Take ½ tablet by mouth Every 12 (Twelve) Hours.   • metroNIDAZOLE (FLAGYL) 500 mg, Oral, 2 Times Daily   • nitroglycerin (NITROSTAT) 0.4 mg, Sublingual, Every 5 Minutes PRN   • pantoprazole (PROTONIX) 40 mg, Oral, Daily   • polyethylene glycol (MIRALAX) packet 17 g, Oral, Daily   • ranolazine (RANEXA) 500 mg, Oral, 2 Times Daily   • tamsulosin (FLOMAX) 0.4 MG capsule 24 hr capsule 1 capsule, Oral, Nightly   • tiotropium bromide monohydrate (SPIRIVA RESPIMAT) 2.5 MCG/ACT aerosol solution inhaler 1 puff, Inhalation, Daily - RT         Assessment & Plan   Diagnoses and all orders for this visit:    1. Diastasis recti (Primary)    2. Umbilical hernia without  obstruction and without gangrene    3. Gastroesophageal reflux disease without esophagitis    His primary concern is reflux and I explained the best thing for this would be losing weight and stopping smoking. He will continue on antacids and finish out the course for H pylori. Recheck in 6 months.

## 2022-07-21 ENCOUNTER — PATIENT ROUNDING (BHMG ONLY) (OUTPATIENT)
Dept: FAMILY MEDICINE CLINIC | Facility: CLINIC | Age: 72
End: 2022-07-21

## 2022-07-21 NOTE — PROGRESS NOTES
July 21, 2022    Hello, may I speak with Bryson Baker?    My name is Sada Ching     I am  with [facility]  [address] [place] [id number] [phone].    Before we get started may I verify your date of birth? 1950    I am calling to officially welcome you to our practice and ask about your recent visit. Is this a good time to talk? yes    Tell me about your visit with us. What things went well?  Everything was ok       We're always looking for ways to make our patients' experiences even better. Do you have recommendations on ways we may improve?  no    Overall were you satisfied with your first visit to our practice? yes       I appreciate you taking the time to speak with me today. Is there anything else I can do for you? no      Thank you, and have a great day.

## 2022-07-22 ENCOUNTER — LAB (OUTPATIENT)
Dept: FAMILY MEDICINE CLINIC | Facility: CLINIC | Age: 72
End: 2022-07-22

## 2022-07-22 DIAGNOSIS — R00.2 PALPITATIONS: ICD-10-CM

## 2022-07-22 DIAGNOSIS — E78.2 MIXED HYPERLIPIDEMIA: ICD-10-CM

## 2022-07-22 DIAGNOSIS — I10 ESSENTIAL HYPERTENSION: Primary | ICD-10-CM

## 2022-07-22 LAB
BUN SERPL-MCNC: 10 MG/DL (ref 8–23)
BUN/CREAT SERPL: 9.4 (ref 7–25)
CALCIUM SERPL-MCNC: 8.9 MG/DL (ref 8.6–10.5)
CHLORIDE SERPL-SCNC: 100 MMOL/L (ref 98–107)
CHOLEST SERPL-MCNC: 103 MG/DL (ref 0–200)
CO2 SERPL-SCNC: 27.9 MMOL/L (ref 22–29)
CREAT SERPL-MCNC: 1.06 MG/DL (ref 0.76–1.27)
EGFRCR SERPLBLD CKD-EPI 2021: 75.5 ML/MIN/1.73
GLUCOSE SERPL-MCNC: 101 MG/DL (ref 65–99)
HDLC SERPL-MCNC: 37 MG/DL (ref 40–60)
LDLC SERPL CALC-MCNC: 48 MG/DL (ref 0–100)
POTASSIUM SERPL-SCNC: 4.9 MMOL/L (ref 3.5–5.2)
SODIUM SERPL-SCNC: 137 MMOL/L (ref 136–145)
TRIGL SERPL-MCNC: 93 MG/DL (ref 0–150)
VLDLC SERPL CALC-MCNC: 18 MG/DL (ref 5–40)

## 2022-08-15 ENCOUNTER — OFFICE VISIT (OUTPATIENT)
Dept: PULMONOLOGY | Facility: CLINIC | Age: 72
End: 2022-08-15

## 2022-08-15 VITALS
HEIGHT: 71 IN | HEART RATE: 61 BPM | TEMPERATURE: 97.5 F | WEIGHT: 200 LBS | SYSTOLIC BLOOD PRESSURE: 138 MMHG | BODY MASS INDEX: 28 KG/M2 | OXYGEN SATURATION: 94 % | DIASTOLIC BLOOD PRESSURE: 88 MMHG

## 2022-08-15 DIAGNOSIS — R91.8 MULTIPLE PULMONARY NODULES: ICD-10-CM

## 2022-08-15 DIAGNOSIS — Z72.0 TOBACCO ABUSE: ICD-10-CM

## 2022-08-15 DIAGNOSIS — J44.9 COPD MIXED TYPE: Primary | ICD-10-CM

## 2022-08-15 DIAGNOSIS — E66.3 OVERWEIGHT: ICD-10-CM

## 2022-08-15 PROCEDURE — 99214 OFFICE O/P EST MOD 30 MIN: CPT | Performed by: NURSE PRACTITIONER

## 2022-08-15 NOTE — PROGRESS NOTES
"Chief Complaint  Multiple Pulmonary Nodules and Emphysema    Subjective        Bryson Baker presents to [facility]  History of Present Illness    Mr. Baker is a 72 year old male with a medical history significant for anxiety, arthritis, asthma, COPD, CAD, GERD, hypertension, and sleep apnea.    He presents today for a routine follow up on multiple pulmonary nodules and emphysema.  He states that he has been doing well since his last visit.  He reports that his shortness of breath is at baseline.  He is currently taking Symbicort BID and Spiriva once daily.  He is also taking albuterol as needed.  He is a current smoker of about 1 ppd.        Objective   Vital Signs:  /88   Pulse 61   Temp 97.5 °F (36.4 °C) (Temporal)   Ht 180.3 cm (71\")   Wt 90.7 kg (200 lb)   SpO2 94%   BMI 27.89 kg/m²   Estimated body mass index is 27.89 kg/m² as calculated from the following:    Height as of this encounter: 180.3 cm (71\").    Weight as of this encounter: 90.7 kg (200 lb).    BMI is >= 25 and <30. (Overweight) The following options were offered after discussion;: exercise counseling/recommendations and nutrition counseling/recommendations      Physical Exam     GENERAL APPEARANCE: Well developed, well nourished, alert and cooperative, and appears to be in no acute distress.    HEAD: normocephalic. Atraumatic.    EYES: PERRL, EOMI. Vision is grossly intact.    THROAT: Oral cavity and pharynx normal. No inflammation, swelling, exudate, or lesions.     NECK: Neck supple.  No thyromegaly.    CARDIAC: Normal S1 and S2. No S3, S4 or murmurs. Rhythm is regular.     RESPIRATORY:Bilateral air entry positive. Bilateral diminished breath sounds. No wheezing, crackles or rhonchi noted.    GI: Positive bowel sounds. Soft, nondistended, nontender.     MUSCULOSKELETAL: No significant deformity or joint abnormality. No edema. Peripheral pulses intact. No " varicosities.    NEUROLOGICAL: Strength and sensation symmetric and intact throughout.     PSYCHIATRIC: The mental examination revealed the patient was oriented to person, place, and time.     Result Review :  The following data was reviewed by: ANGELIC Michaels on 08/15/2022:  Common labs    Common Labsle 7/22/22 7/22/22    1031 1031   Glucose  101 (A)   BUN  10   Creatinine  1.06   Sodium  137   Potassium  4.9   Chloride  100   Calcium  8.9   Total Cholesterol 103    Triglycerides 93    HDL Cholesterol 37 (A)    LDL Cholesterol  48    (A) Abnormal value       Comments are available for some flowsheets but are not being displayed.           Data reviewed: [place]          Assessment and Plan   Diagnoses and all orders for this visit:    1. Emphysema (Primary)    2. Multiple pulmonary nodules    3. Tobacco abuse    4. Overweight         Most recent CT Chest was reviewed.  Order was placed for a 3 month follow up CT chest, this is scheduled for 8/29.    Continue Symbicort BID and Spiriva once daily.  Continue albuterol as needed.     Bryson Baker  reports that he has been smoking cigarettes. He has been smoking about 1.00 pack per day. He has never used smokeless tobacco.. I have educated him on the risk of diseases from using tobacco products such as cancer, COPD and heart disease.     I advised him to quit and he is not willing to quit.        Follow Up   Return in about 6 months (around 2/15/2023).  Patient was given instructions and counseling regarding his condition or for health maintenance advice. Please see specific information pulled into the AVS if appropriate.

## 2022-08-16 PROBLEM — R05.3 CHRONIC COUGH: Status: RESOLVED | Noted: 2022-04-04 | Resolved: 2022-08-16

## 2022-08-29 ENCOUNTER — HOSPITAL ENCOUNTER (OUTPATIENT)
Dept: CT IMAGING | Facility: HOSPITAL | Age: 72
Discharge: HOME | End: 2022-08-29
Admitting: NURSE PRACTITIONER

## 2022-08-29 DIAGNOSIS — R91.8 MULTIPLE PULMONARY NODULES: ICD-10-CM

## 2022-08-29 PROCEDURE — 71250 CT THORAX DX C-: CPT

## 2022-08-29 PROCEDURE — 71250 CT THORAX DX C-: CPT | Performed by: RADIOLOGY

## 2022-09-07 ENCOUNTER — DOCUMENTATION (OUTPATIENT)
Dept: PULMONOLOGY | Facility: CLINIC | Age: 72
End: 2022-09-07

## 2022-09-07 DIAGNOSIS — R91.8 MULTIPLE PULMONARY NODULES: Primary | ICD-10-CM

## 2022-09-07 NOTE — PROGRESS NOTES
We will follow the patient on CT chest results.  Bilateral pulmonary nodules are noted to be stable.  We will plan to repeat CT chest in 3 months.

## 2022-10-10 ENCOUNTER — LAB (OUTPATIENT)
Dept: FAMILY MEDICINE CLINIC | Facility: CLINIC | Age: 72
End: 2022-10-10

## 2022-10-10 DIAGNOSIS — E78.2 MIXED HYPERLIPIDEMIA: ICD-10-CM

## 2022-10-10 DIAGNOSIS — E55.9 VITAMIN D DEFICIENCY: ICD-10-CM

## 2022-10-10 DIAGNOSIS — J44.9 COPD MIXED TYPE: ICD-10-CM

## 2022-10-10 DIAGNOSIS — R73.9 HYPERGLYCEMIA: ICD-10-CM

## 2022-10-10 DIAGNOSIS — R00.2 PALPITATIONS: ICD-10-CM

## 2022-10-10 DIAGNOSIS — I10 ESSENTIAL HYPERTENSION: Primary | ICD-10-CM

## 2022-10-11 LAB
25(OH)D3+25(OH)D2 SERPL-MCNC: 40.1 NG/ML (ref 30–100)
ALBUMIN SERPL-MCNC: 4 G/DL (ref 3.5–5.2)
ALBUMIN/GLOB SERPL: 2.2 G/DL
ALP SERPL-CCNC: 111 U/L (ref 39–117)
ALT SERPL-CCNC: 18 U/L (ref 1–41)
AST SERPL-CCNC: 12 U/L (ref 1–40)
BASOPHILS # BLD AUTO: 0.08 10*3/MM3 (ref 0–0.2)
BASOPHILS NFR BLD AUTO: 1 % (ref 0–1.5)
BILIRUB SERPL-MCNC: 1.1 MG/DL (ref 0–1.2)
BUN SERPL-MCNC: 8 MG/DL (ref 8–23)
BUN/CREAT SERPL: 8.4 (ref 7–25)
CALCIUM SERPL-MCNC: 9 MG/DL (ref 8.6–10.5)
CHLORIDE SERPL-SCNC: 102 MMOL/L (ref 98–107)
CHOLEST SERPL-MCNC: 116 MG/DL (ref 0–200)
CO2 SERPL-SCNC: 27 MMOL/L (ref 22–29)
CREAT SERPL-MCNC: 0.95 MG/DL (ref 0.76–1.27)
EGFRCR SERPLBLD CKD-EPI 2021: 85.6 ML/MIN/1.73
EOSINOPHIL # BLD AUTO: 0.25 10*3/MM3 (ref 0–0.4)
EOSINOPHIL NFR BLD AUTO: 3.2 % (ref 0.3–6.2)
ERYTHROCYTE [DISTWIDTH] IN BLOOD BY AUTOMATED COUNT: 13.5 % (ref 12.3–15.4)
GLOBULIN SER CALC-MCNC: 1.8 GM/DL
GLUCOSE SERPL-MCNC: 92 MG/DL (ref 65–99)
HBA1C MFR BLD: 5.8 % (ref 4.8–5.6)
HCT VFR BLD AUTO: 45.5 % (ref 37.5–51)
HDLC SERPL-MCNC: 35 MG/DL (ref 40–60)
HGB BLD-MCNC: 15.6 G/DL (ref 13–17.7)
IMM GRANULOCYTES # BLD AUTO: 0.03 10*3/MM3 (ref 0–0.05)
IMM GRANULOCYTES NFR BLD AUTO: 0.4 % (ref 0–0.5)
LDLC SERPL CALC-MCNC: 63 MG/DL (ref 0–100)
LYMPHOCYTES # BLD AUTO: 2.53 10*3/MM3 (ref 0.7–3.1)
LYMPHOCYTES NFR BLD AUTO: 32.3 % (ref 19.6–45.3)
MCH RBC QN AUTO: 30.2 PG (ref 26.6–33)
MCHC RBC AUTO-ENTMCNC: 34.3 G/DL (ref 31.5–35.7)
MCV RBC AUTO: 88.2 FL (ref 79–97)
MONOCYTES # BLD AUTO: 0.67 10*3/MM3 (ref 0.1–0.9)
MONOCYTES NFR BLD AUTO: 8.6 % (ref 5–12)
NEUTROPHILS # BLD AUTO: 4.27 10*3/MM3 (ref 1.7–7)
NEUTROPHILS NFR BLD AUTO: 54.5 % (ref 42.7–76)
NRBC BLD AUTO-RTO: 0 /100 WBC (ref 0–0.2)
PLATELET # BLD AUTO: 305 10*3/MM3 (ref 140–450)
POTASSIUM SERPL-SCNC: 4.7 MMOL/L (ref 3.5–5.2)
PROT SERPL-MCNC: 5.8 G/DL (ref 6–8.5)
RBC # BLD AUTO: 5.16 10*6/MM3 (ref 4.14–5.8)
SODIUM SERPL-SCNC: 136 MMOL/L (ref 136–145)
TRIGL SERPL-MCNC: 95 MG/DL (ref 0–150)
TSH SERPL DL<=0.005 MIU/L-ACNC: 1.93 UIU/ML (ref 0.27–4.2)
VLDLC SERPL CALC-MCNC: 18 MG/DL (ref 5–40)
WBC # BLD AUTO: 7.83 10*3/MM3 (ref 3.4–10.8)

## 2022-10-17 ENCOUNTER — OFFICE VISIT (OUTPATIENT)
Dept: FAMILY MEDICINE CLINIC | Facility: CLINIC | Age: 72
End: 2022-10-17

## 2022-10-17 VITALS
HEIGHT: 71 IN | SYSTOLIC BLOOD PRESSURE: 132 MMHG | WEIGHT: 221 LBS | HEART RATE: 82 BPM | TEMPERATURE: 97.8 F | RESPIRATION RATE: 19 BRPM | DIASTOLIC BLOOD PRESSURE: 72 MMHG | BODY MASS INDEX: 30.94 KG/M2 | OXYGEN SATURATION: 96 %

## 2022-10-17 DIAGNOSIS — I73.9 PVD (PERIPHERAL VASCULAR DISEASE): Chronic | ICD-10-CM

## 2022-10-17 DIAGNOSIS — M47.25 OSTEOARTHRITIS OF SPINE WITH RADICULOPATHY, THORACOLUMBAR REGION: Chronic | ICD-10-CM

## 2022-10-17 DIAGNOSIS — Z72.0 TOBACCO ABUSE: Chronic | ICD-10-CM

## 2022-10-17 DIAGNOSIS — R91.8 MULTIPLE PULMONARY NODULES: Chronic | ICD-10-CM

## 2022-10-17 DIAGNOSIS — Z00.00 MEDICARE ANNUAL WELLNESS VISIT, SUBSEQUENT: Primary | ICD-10-CM

## 2022-10-17 DIAGNOSIS — E55.9 VITAMIN D DEFICIENCY: Chronic | ICD-10-CM

## 2022-10-17 DIAGNOSIS — E78.2 MIXED HYPERLIPIDEMIA: Chronic | ICD-10-CM

## 2022-10-17 DIAGNOSIS — K59.03 DRUG-INDUCED CONSTIPATION: Chronic | ICD-10-CM

## 2022-10-17 DIAGNOSIS — Z11.59 ENCOUNTER FOR HEPATITIS C SCREENING TEST FOR LOW RISK PATIENT: ICD-10-CM

## 2022-10-17 DIAGNOSIS — M25.59 PAIN IN OTHER JOINT: ICD-10-CM

## 2022-10-17 DIAGNOSIS — R21 RASH: Chronic | ICD-10-CM

## 2022-10-17 DIAGNOSIS — I25.10 ASCVD (ARTERIOSCLEROTIC CARDIOVASCULAR DISEASE): Chronic | ICD-10-CM

## 2022-10-17 DIAGNOSIS — R73.9 HYPERGLYCEMIA: Chronic | ICD-10-CM

## 2022-10-17 DIAGNOSIS — M62.08 DIASTASIS RECTI: Chronic | ICD-10-CM

## 2022-10-17 DIAGNOSIS — Z23 ENCOUNTER FOR IMMUNIZATION: ICD-10-CM

## 2022-10-17 DIAGNOSIS — I10 ESSENTIAL HYPERTENSION: Chronic | ICD-10-CM

## 2022-10-17 DIAGNOSIS — K21.9 GASTROESOPHAGEAL REFLUX DISEASE WITHOUT ESOPHAGITIS: Chronic | ICD-10-CM

## 2022-10-17 DIAGNOSIS — R73.03 PREDIABETES: Chronic | ICD-10-CM

## 2022-10-17 DIAGNOSIS — J44.9 COPD MIXED TYPE: Chronic | ICD-10-CM

## 2022-10-17 DIAGNOSIS — K42.9 UMBILICAL HERNIA WITHOUT OBSTRUCTION AND WITHOUT GANGRENE: Chronic | ICD-10-CM

## 2022-10-17 PROCEDURE — G0439 PPPS, SUBSEQ VISIT: HCPCS | Performed by: PHYSICIAN ASSISTANT

## 2022-10-17 PROCEDURE — 86225 DNA ANTIBODY NATIVE: CPT | Performed by: PHYSICIAN ASSISTANT

## 2022-10-17 PROCEDURE — 82550 ASSAY OF CK (CPK): CPT | Performed by: PHYSICIAN ASSISTANT

## 2022-10-17 PROCEDURE — 86235 NUCLEAR ANTIGEN ANTIBODY: CPT | Performed by: PHYSICIAN ASSISTANT

## 2022-10-17 PROCEDURE — 81001 URINALYSIS AUTO W/SCOPE: CPT | Performed by: PHYSICIAN ASSISTANT

## 2022-10-17 PROCEDURE — G0008 ADMIN INFLUENZA VIRUS VAC: HCPCS | Performed by: PHYSICIAN ASSISTANT

## 2022-10-17 PROCEDURE — 86803 HEPATITIS C AB TEST: CPT | Performed by: PHYSICIAN ASSISTANT

## 2022-10-17 PROCEDURE — 86038 ANTINUCLEAR ANTIBODIES: CPT | Performed by: PHYSICIAN ASSISTANT

## 2022-10-17 PROCEDURE — 90662 IIV NO PRSV INCREASED AG IM: CPT | Performed by: PHYSICIAN ASSISTANT

## 2022-10-17 PROCEDURE — 80053 COMPREHEN METABOLIC PANEL: CPT | Performed by: PHYSICIAN ASSISTANT

## 2022-10-17 PROCEDURE — 86431 RHEUMATOID FACTOR QUANT: CPT | Performed by: PHYSICIAN ASSISTANT

## 2022-10-17 RX ORDER — CELECOXIB 200 MG/1
200 CAPSULE ORAL DAILY
Qty: 90 CAPSULE | Refills: 3 | Status: SHIPPED | OUTPATIENT
Start: 2022-10-17 | End: 2022-10-25 | Stop reason: SDUPTHER

## 2022-10-17 NOTE — PROGRESS NOTES
The ABCs of the Annual Wellness Visit  Subsequent Medicare Wellness Visit    Chief Complaint   Patient presents with   • Medicare Wellness-subsequent      Subjective    History of Present Illness:  Bryson Baker is a 72 y.o. male who presents for a Subsequent Medicare Wellness Visit.    Osteoarthritis-  Patient complains of moderate dull achy pain in multiple joints that is worse with cold weather changes.  Minimal relief with Tylenol or ibuprofen over-the-counter    Prediabetes-  We discussed his most recent hemoglobin A1c of 5.7 which does newly diagnosed him today with prediabetes.    Rash-  Patient complains of scattered rash in the form of plaques with some white scaling on abdomen and legs.  He states that rash appears in different areas intermittently over the past several years.    Hypertension-controlled with metoprolol and losartan    Constipation-stable with Dulcolax and dietary modification    Hyperlipidemia-stable with atorvastatin    Vitamin D deficiency-stable with vitamin D supplementation    Peripheral vascular disease-stable with conservative measures    Atherosclerotic cardiovascular disease-   stable with known 50% stenosis in RCA in 2013.  Patient continues conservative measures including risk factor modification with atorvastatin    Diastases recti and umbilical hernia-  Stable with patient denying any changes in color, pain, or size    Gastroesophageal reflux disease-stable with pantoprazole    Pulmonary nodules-stable and being followed by pulmonology.    8/29/2022 CT chest revealed nodules of the right upper lobe appear relatively stable.  Nodules of the right lower lobe appear stable.  Largest nodule in the right lung is 8.3 mm and was previously 9.1 mm.  5.5 mm nodule left lower lobe was previously 5.9 mm.  Small nodules in left upper lobe are stable.  No new nodules identified. No interval change in small bilateral pulmonary nodules with stable appearance of index nodules noted in each  lung.    Emphysema-  Not at goal due to continued smoking of tobacco.  Patient states he is not interested in help with smoking cessation at this time      The following portions of the patient's history were reviewed and   updated as appropriate: allergies, current medications, past family history, past medical history, past social history, past surgical history and problem list.    Compared to one year ago, the patient feels his physical   health is worse.    Compared to one year ago, the patient feels his mental   health is worse.    Recent Hospitalizations:  He was not admitted to the hospital during the last year.       Current Medical Providers:  Patient Care Team:  Rita Orr PA as PCP - General (Family Medicine)    Outpatient Medications Prior to Visit   Medication Sig Dispense Refill   • albuterol (PROVENTIL HFA;VENTOLIN HFA) 108 (90 Base) MCG/ACT inhaler Inhale 2 puffs Every 4 (Four) Hours As Needed for Wheezing.     • aspirin 81 MG EC tablet Take 1 tablet by mouth Daily. 30 tablet 0   • atorvastatin (LIPITOR) 20 MG tablet Take 40 mg by mouth Daily.     • betamethasone dipropionate (DIPROLENE) 0.05 % ointment Apply 1 application topically to the appropriate area as directed Daily. Prior to [facility] Admission, Patient was on: Applies to legs for rash     • bisacodyl (DULCOLAX) 5 MG EC tablet Take 10 mg by mouth 2 (Two) Times a Day.     • budesonide-formoterol (SYMBICORT) 160-4.5 MCG/ACT inhaler Inhale 2 puffs 2 (Two) Times a Day.     • cetirizine (zyrTEC) 10 MG tablet Take 10 mg by mouth Daily.     • clobetasol (TEMOVATE) 0.05 % cream Apply 1 application topically to the appropriate area as directed 2 (Two) Times a Day As Needed (rash). Prior to [facility] Admission, Patient was on: applies to legs     • diphenhydrAMINE (BENADRYL) 50 MG capsule Take 1 capsule by mouth Every 2 (Two) Hours.     • gabapentin (NEURONTIN) 400 MG capsule Take 400 mg by mouth 3 (Three) Times a Day.     •  HYDROcodone-acetaminophen (NORCO)  MG per tablet Take 1 tablet by mouth Every 6 (Six) Hours As Needed for Moderate Pain .     • losartan (Cozaar) 25 MG tablet Take 1 tablet by mouth Daily. 30 tablet 3   • metoprolol tartrate (LOPRESSOR) 25 MG tablet Take ½ tablet by mouth Every 12 (Twelve) Hours. 30 tablet 0   • nitroglycerin (NITROSTAT) 0.4 MG SL tablet Place 0.4 mg under the tongue Every 5 (Five) Minutes As Needed.     • pantoprazole (PROTONIX) 40 MG EC tablet Take 40 mg by mouth Daily.     • polyethylene glycol (MIRALAX) packet Take 17 g by mouth Daily.     • ranolazine (RANEXA) 500 MG 12 hr tablet Take 1 tablet by mouth 2 (Two) Times a Day. 180 tablet 1   • tamsulosin (FLOMAX) 0.4 MG capsule 24 hr capsule Take 1 capsule by mouth Every Night.     • tiotropium bromide monohydrate (SPIRIVA RESPIMAT) 2.5 MCG/ACT aerosol solution inhaler Inhale 1 puff Daily. 1 inhaler 5     No facility-administered medications prior to visit.       Opioid medication/s are on active medication list.  and I have evaluated his active treatment plan and pain score trends (see table).  Vitals:    10/17/22 1354   PainSc:   6   PainLoc: Shoulder     I have reviewed the chart for potential of high risk medication and harmful drug interactions in the elderly.            Aspirin is on active medication list. Aspirin use is indicated based on review of current medical condition/s. Pros and cons of this therapy have been discussed today. Benefits of this medication outweigh potential harm.  Patient has been encouraged to continue taking this medication.  .      Patient Active Problem List   Diagnosis   • Essential hypertension   • Mixed hyperlipidemia   • ASCVD with 50% stenosis in RCA in 2013   • PVD (peripheral vascular disease) (HCC)   • Precordial chest pain   • Tobacco abuse   • Palpitations   • Emphysema   • Multiple pulmonary nodules   • GAYTAN (dyspnea on exertion)   • History of colon polyps   • Diastasis recti   • Umbilical hernia  "without obstruction and without gangrene   • Gastroesophageal reflux disease without esophagitis     Advance Care Planning  Advance Directive is not on file.  ACP discussion was held with the patient during this visit. Patient does not have an advance directive, information provided.          Objective    Vitals:    10/17/22 1354   BP: 132/72   BP Location: Right arm   Patient Position: Sitting   Cuff Size: Adult   Pulse: 82   Resp: 19   Temp: 97.8 °F (36.6 °C)   TempSrc: Temporal   SpO2: 96%   Weight: 100 kg (221 lb)   Height: 180.3 cm (71\")   PainSc:   6   PainLoc: Shoulder     Estimated body mass index is 30.82 kg/m² as calculated from the following:    Height as of this encounter: 180.3 cm (71\").    Weight as of this encounter: 100 kg (221 lb).    BMI is >= 30 and <35. (Class 1 Obesity). The following options were offered after discussion;: exercise counseling/recommendations and nutrition counseling/recommendations      Does the patient have evidence of cognitive impairment? No    Physical Exam  Vitals and nursing note reviewed.   Constitutional:       Appearance: Normal appearance. He is well-developed.   HENT:      Head: Normocephalic and atraumatic.      Right Ear: Tympanic membrane normal.      Left Ear: Tympanic membrane normal.      Nose: Nose normal.      Mouth/Throat:      Mouth: Mucous membranes are moist.      Pharynx: No oropharyngeal exudate or posterior oropharyngeal erythema.   Eyes:      Extraocular Movements: Extraocular movements intact.      Conjunctiva/sclera: Conjunctivae normal.   Neck:      Thyroid: No thyromegaly.      Trachea: No tracheal deviation.   Cardiovascular:      Rate and Rhythm: Normal rate and regular rhythm.      Heart sounds: Normal heart sounds. No murmur heard.  Pulmonary:      Effort: Pulmonary effort is normal. No respiratory distress.      Breath sounds: Normal breath sounds. No wheezing.   Abdominal:      General: Bowel sounds are normal.      Palpations: Abdomen is " soft.      Tenderness: There is no abdominal tenderness. There is no guarding.      Hernia: A hernia is present.      Comments: Diastases recti is noted with small reducible nontender flesh-colored umbilical hernia   Musculoskeletal:         General: No tenderness. Normal range of motion.      Cervical back: Normal range of motion and neck supple.      Comments: Left middle finger noted to be absent of tip distal to DIP joint status post trauma in the past   Lymphadenopathy:      Cervical: No cervical adenopathy.   Skin:     General: Skin is warm and dry.      Findings: No rash.   Neurological:      General: No focal deficit present.      Mental Status: He is alert and oriented to person, place, and time.   Psychiatric:         Mood and Affect: Mood normal.         Behavior: Behavior normal.         Thought Content: Thought content normal.       Lab Results   Component Value Date    CHLPL 116 10/10/2022    TRIG 127 10/18/2022    HDL 36 (L) 10/18/2022    LDL 62 10/18/2022    LDL 63 10/10/2022    VLDL 23 10/18/2022    VLDL 18 10/10/2022    HGBA1C 5.40 10/18/2022     Lab Results   Component Value Date    WBC 8.80 10/18/2022    HGB 15.6 10/18/2022    HCT 46.0 10/18/2022    MCV 88.8 10/18/2022     10/18/2022     Lab Results   Component Value Date    GLUCOSE 77 10/17/2022    BUN 14 10/17/2022    CREATININE 0.99 10/17/2022    EGFRIFNONA 83 09/07/2018    BCR 14 10/17/2022    K 4.7 10/17/2022    CO2 16 (L) 10/17/2022    CALCIUM 9.0 10/17/2022    PROTENTOTREF 6.5 10/17/2022    ALBUMIN 4.1 10/17/2022    LABIL2 1.7 10/17/2022    AST 13 10/17/2022    ALT 17 10/17/2022              HEALTH RISK ASSESSMENT    Smoking Status:  Social History     Tobacco Use   Smoking Status Every Day   • Packs/day: 1.00   • Types: Cigarettes   Smokeless Tobacco Never   Tobacco Comments    had quit but started back today 10/23/18     Alcohol Consumption:  Social History     Substance and Sexual Activity   Alcohol Use No     Fall Risk  Screen:    KATELYN Fall Risk Assessment was completed, and patient is at LOW risk for falls.Assessment completed on:10/17/2022    Depression Screening:  PHQ-2/PHQ-9 Depression Screening 10/17/2022   Little Interest or Pleasure in Doing Things 0-->not at all   Feeling Down, Depressed or Hopeless 0-->not at all   PHQ-9: Brief Depression Severity Measure Score 0       Health Habits and Functional and Cognitive Screening:  Functional & Cognitive Status 10/17/2022   Do you have difficulty preparing food and eating? No   Do you have difficulty bathing yourself, getting dressed or grooming yourself? No   Do you have difficulty using the toilet? No   Do you have difficulty moving around from place to place? No   Do you have trouble with steps or getting out of a bed or a chair? No   Current Diet Well Balanced Diet   Dental Exam Other   Eye Exam Not up to date   Exercise (times per week) 4 times per week   Current Exercises Include Walking   Do you need help using the phone?  No   Are you deaf or do you have serious difficulty hearing?  No   Do you need help with transportation? No   Do you need help shopping? No   Do you need help preparing meals?  No   Do you need help with housework?  No   Do you need help with laundry? No   Do you need help taking your medications? No   Do you need help managing money? No   Do you ever drive or ride in a car without wearing a seat belt? No   Have you felt unusual stress, anger or loneliness in the last month? No   If you need help, do you have trouble finding someone available to you? No   Have you been bothered in the last four weeks by sexual problems? No   Do you have difficulty concentrating, remembering or making decisions? No       Age-appropriate Screening Schedule:  Refer to the list below for future screening recommendations based on patient's age, sex and/or medical conditions. Orders for these recommended tests are listed in the plan section. The patient has been provided with  a written plan.    Health Maintenance   Topic Date Due   • TDAP/TD VACCINES (1 - Tdap) Never done   • ZOSTER VACCINE (1 of 2) Never done   • LIPID PANEL  10/18/2023   • INFLUENZA VACCINE  Completed              Assessment & Plan   CMS Preventative Services Quick Reference  Risk Factors Identified During Encounter  Cardiovascular Disease  Fall Risk-High or Moderate  Hearing Problem  Obesity/Overweight   Polypharmacy  The above risks/problems have been discussed with the patient.  Follow up actions/plans if indicated are seen below in the Assessment/Plan Section.  Pertinent information has been shared with the patient in the After Visit Summary.    Diagnoses and all orders for this visit:    1. Medicare annual wellness visit, subsequent (Primary)  Comments:  Performed and documented today    2. Encounter for immunization  -     Fluzone High-Dose 65+yrs ([id number])    3. Osteoarthritis of spine with radiculopathy, thoracolumbar region  Comments:  Start Celebrex  Advised conservative measures and daily stretching  Orders:  -     celecoxib (CeleBREX) 200 MG capsule; Take 1 capsule by mouth Daily.  Dispense: 90 capsule; Refill: 3    4. Encounter for hepatitis C screening test for low risk patient  -     Hepatitis C antibody; Future  -     Hepatitis C antibody    5. Pain in other joint  -     CK  -     ADAMA  -     Rheumatoid Factor  -     Urinalysis With Microscopic - Urine, Clean Catch  -     Anti-scleroderma Antibody  -     Anti-RNA Polymerase III (RDL)  -     Anti-DNA Antibody, Double-stranded    6. Rash  Comments:  Discussed differential diagnosis including psoriasis  Checking patient for scleroderma   Patient declines referral dermatology    Orders:  -     CK  -     ADAMA  -     Rheumatoid Factor  -     Urinalysis With Microscopic - Urine, Clean Catch  -     Anti-scleroderma Antibody  -     Anti-RNA Polymerase III (RDL)  -     Anti-DNA Antibody, Double-stranded    7. Essential hypertension  Comments:  Continue to monitor  as may be acutely elevated due to pain  Plan to treat underlying H. pylori  Continue losartan and metoprolol  Orders:  -     Comprehensive Metabolic Panel  -     TSH  -     Hemoglobin A1c    8. Drug-induced constipation  Comments:  Advised a high-fiber diet  Continue Dulcolax  Orders:  -     Comprehensive Metabolic Panel    9. Mixed hyperlipidemia  Comments:  Advised low-cholesterol diet  Continue atorvastatin  Orders:  -     Hemoglobin A1c    10. Hyperglycemia  Comments:  Prediabetes  Advised a low carbohydrate diabetic diet  Orders:  -     Hemoglobin A1c    11. Vitamin D deficiency  Comments:  Recheck vitamin D for further evaluation  Orders:  -     Vitamin D 25 Hydroxy    12. PVD (peripheral vascular disease) (HCC)  Comments:  Advised conservative measures including leg elevation and low-sodium diet    13. ASCVD with 50% stenosis in RCA in 2013  Comments:  Advised risk factor modification including atorvastatin and aspirin  Advised to report any new symptoms and follow-up regularly with cardiology    14. Umbilical hernia without obstruction and without gangrene  Comments:  Continue to monitor  Patient declines referral to surgery for correction    15. Gastroesophageal reflux disease without esophagitis  Comments:  Continue pantoprazole  Advised to avoid known trigger foods    16. Diastasis recti  Comments:  Conservative measures advised    17. Multiple pulmonary nodules  Comments:  Discussed his CT chest results  Continue care with pulmonology for monitoring    18. Emphysema  Comments:  Advise smoking cessation  Continue albuterol Symbicort and Spiriva    19. Tobacco abuse  Comments:  Advise smoking cessation  Patient declines help with smoking cessation at this time    20. Prediabetes  Comments:  Hemoglobin A1c 5.7  Advised a low carbohydrate diabetic diet        Follow Up:   Return in about 6 weeks (around 11/28/2022) for Followup with Rita.     An After Visit Summary and PPPS were made available to the  patient.

## 2022-10-18 LAB
BACTERIA UR QL AUTO: NORMAL /HPF
BILIRUB UR QL STRIP: NEGATIVE
CHROMATIN AB SERPL-ACNC: <10 IU/ML (ref 0–14)
CK SERPL-CCNC: 103 U/L (ref 20–200)
CLARITY UR: CLEAR
COLOR UR: YELLOW
GLUCOSE UR STRIP-MCNC: NEGATIVE MG/DL
HCV AB SER DONR QL: NORMAL
HGB UR QL STRIP.AUTO: ABNORMAL
HYALINE CASTS UR QL AUTO: NORMAL /LPF
KETONES UR QL STRIP: NEGATIVE
LEUKOCYTE ESTERASE UR QL STRIP.AUTO: NEGATIVE
NITRITE UR QL STRIP: NEGATIVE
PH UR STRIP.AUTO: 6.5 [PH] (ref 5–8)
PROT UR QL STRIP: NEGATIVE
RBC # UR STRIP: NORMAL /HPF
REF LAB TEST METHOD: NORMAL
SP GR UR STRIP: 1.01 (ref 1–1.03)
SQUAMOUS #/AREA URNS HPF: NORMAL /HPF
UROBILINOGEN UR QL STRIP: ABNORMAL
WBC # UR STRIP: NORMAL /HPF

## 2022-10-18 PROCEDURE — 82306 VITAMIN D 25 HYDROXY: CPT | Performed by: PHYSICIAN ASSISTANT

## 2022-10-18 PROCEDURE — 84443 ASSAY THYROID STIM HORMONE: CPT | Performed by: PHYSICIAN ASSISTANT

## 2022-10-18 PROCEDURE — 80061 LIPID PANEL: CPT | Performed by: PHYSICIAN ASSISTANT

## 2022-10-18 PROCEDURE — 86235 NUCLEAR ANTIGEN ANTIBODY: CPT | Performed by: PHYSICIAN ASSISTANT

## 2022-10-18 PROCEDURE — 83036 HEMOGLOBIN GLYCOSYLATED A1C: CPT | Performed by: PHYSICIAN ASSISTANT

## 2022-10-18 PROCEDURE — 85025 COMPLETE CBC W/AUTO DIFF WBC: CPT | Performed by: PHYSICIAN ASSISTANT

## 2022-10-19 PROBLEM — R07.9 CHEST PAIN: Status: RESOLVED | Noted: 2018-08-30 | Resolved: 2022-10-19

## 2022-10-19 LAB
ALBUMIN SERPL-MCNC: 4.1 G/DL (ref 3.7–4.7)
ALBUMIN/GLOB SERPL: 1.7 {RATIO} (ref 1.2–2.2)
ALP SERPL-CCNC: 116 IU/L (ref 44–121)
ALT SERPL-CCNC: 17 IU/L (ref 0–44)
ANA SER QL: NEGATIVE
AST SERPL-CCNC: 13 IU/L (ref 0–40)
BILIRUB SERPL-MCNC: 0.2 MG/DL (ref 0–1.2)
BUN SERPL-MCNC: 14 MG/DL (ref 8–27)
BUN/CREAT SERPL: 14 (ref 10–24)
CALCIUM SERPL-MCNC: 9 MG/DL (ref 8.6–10.2)
CHLORIDE SERPL-SCNC: 103 MMOL/L (ref 96–106)
CO2 SERPL-SCNC: 16 MMOL/L (ref 20–29)
CREAT SERPL-MCNC: 0.99 MG/DL (ref 0.76–1.27)
DSDNA AB SER-ACNC: 1 IU/ML (ref 0–9)
EGFRCR SERPLBLD CKD-EPI 2021: 81 ML/MIN/1.73
GLOBULIN SER CALC-MCNC: 2.4 G/DL (ref 1.5–4.5)
GLUCOSE SERPL-MCNC: 77 MG/DL (ref 70–99)
POTASSIUM SERPL-SCNC: 4.7 MMOL/L (ref 3.5–5.2)
PROT SERPL-MCNC: 6.5 G/DL (ref 6–8.5)
SODIUM SERPL-SCNC: 139 MMOL/L (ref 134–144)

## 2022-10-19 NOTE — PATIENT INSTRUCTIONS
Medicare Wellness  Personal Prevention Plan of Service     Date of Office Visit:    Encounter Provider:  ESTER Garner  Place of Service:  [facility]  Patient Name: Bryson Baker  :  1950    As part of the Medicare Wellness portion of your visit today, we are providing you with this personalized preventive plan of services (PPPS). This plan is based upon recommendations of the [place] Preventive Services Task Force (USPSTF) and the Advisory Committee on Immunization Practices (ACIP).    This lists the preventive care services that should be considered, and provides dates of when you are due. Items listed as completed are up-to-date and do not require any further intervention.    Health Maintenance   Topic Date Due    TDAP/TD VACCINES (1 - Tdap) Never done    ZOSTER VACCINE (1 of 2) Never done    Pneumococcal Vaccine 65+ (2 - PPSV23 if available, else PCV20) 2017    COVID-19 Vaccine (2 - Pfizer series) 2022    ANNUAL WELLNESS VISIT  10/17/2023    LIPID PANEL  10/18/2023    COLORECTAL CANCER SCREENING  2032    HEPATITIS C SCREENING  Completed    INFLUENZA VACCINE  Completed    AAA SCREEN (ONE-TIME)  Completed       Orders Placed This Encounter   Procedures    Fluzone High-Dose 65+yrs ([id number])    Hepatitis C antibody     Standing Status:   Future     Number of Occurrences:   1     Standing Expiration Date:   10/17/2023     Order Specific Question:   Release to patient     Answer:   Routine Release    CK     Order Specific Question:   Release to patient     Answer:   Routine Release    ADAMA     Order Specific Question:   Release to patient     Answer:   Routine Release    Rheumatoid Factor     Order Specific Question:   Release to patient     Answer:   Routine Release    Anti-scleroderma Antibody     Order Specific Question:   Release to patient     Answer:   Routine Release    Anti-RNA Polymerase III (RDL)     Order Specific Question:    Release to patient     Answer:   Routine Release    Anti-DNA Antibody, Double-stranded     Order Specific Question:   Release to patient     Answer:   Routine Release    Urinalysis without microscopic (no culture) - Urine, Clean Catch     Order Specific Question:   Release to patient     Answer:   Routine Release    Urinalysis, Microscopic Only - Urine, Clean Catch     Order Specific Question:   Release to patient     Answer:   Routine Release    Urinalysis With Microscopic - Urine, Clean Catch     Order Specific Question:   Release to patient     Answer:   Routine Release       Return in about 6 weeks (around 11/28/2022) for Followup with Rita.        Advance Directive  Advance directives are legal documents that allow you to make decisions about your health care and medical treatment in case you become unable to communicate for yourself. Advance directives let your wishes be known to family, friends, and health care providers.  Discussing and writing advance directives should happen over time rather than all at once. Advance directives can be changed and updated at any time. There are different types of advance directives, such as:  Medical power of .  Living will.  Do not resuscitate (DNR) order or do not attempt resuscitation (DNAR) order.  Health care proxy and medical power of   A health care proxy is also called a health care agent. This person is appointed to make medical decisions for you when you are unable to make decisions for yourself. Generally, people ask a trusted friend or family member to act as their proxy and represent their preferences. Make sure you have an agreement with your trusted person to act as your proxy. A proxy may have to make a medical decision on your behalf if your wishes are not known.  A medical power of , also called a durable power of  for health care, is a legal document that names your health care proxy. Depending on the laws in your state,  the document may need to be:  Signed.  Notarized.  Dated.  Copied.  Witnessed.  Incorporated into your medical record.  You may also want to appoint a trusted person to manage your money in the event you are unable to do so. This is called a durable power of  for finances. It is a separate legal document from the durable power of  for health care. You may choose your health care proxy or someone different to act as your agent in money matters.  If you do not appoint a proxy, or there is a concern that the proxy is not acting in your best interest, a court may appoint a guardian to act on your behalf.  Living will  A living will is a set of instructions that state your wishes about medical care when you cannot express them yourself. Health care providers should keep a copy of your living will in your medical record. You may want to give a copy to family members or friends. To alert caregivers in case of an emergency, you can place a card in your wallet to let them know that you have a living will and where they can find it. A living will is used if you become:  Terminally ill.  Disabled.  Unable to communicate or make decisions.  The following decisions should be included in your living will:  To use or not to use life support equipment, such as dialysis machines and breathing machines (ventilators).  Whether you want a DNR or DNAR order. This tells health care providers not to use cardiopulmonary resuscitation (CPR) if breathing or heartbeat stops.  To use or not to use tube feeding.  To be given or not to be given food and fluids.  Whether you want comfort (palliative) care when the goal becomes comfort rather than a cure.  Whether you want to donate your organs and tissues.  A living will does not give instructions for distributing your money and property if you should pass away.  DNR or DNAR  A DNR or DNAR order is a request not to have CPR in the event that your heart stops beating or you stop  breathing. If a DNR or DNAR order has not been made and shared, a health care provider will try to help any patient whose heart has stopped or who has stopped breathing. If you plan to have surgery, talk with your health care provider about how your DNR or DNAR order will be followed if problems occur.  What if I do not have an advance directive?  Some states assign family decision makers to act on your behalf if you do not have an advance directive. Each state has its own laws about advance directives. You may want to check with your health care provider, , or state representative about the laws in your state.  Summary  Advance directives are legal documents that allow you to make decisions about your health care and medical treatment in case you become unable to communicate for yourself.  The process of discussing and writing advance directives should happen over time. You can change and update advance directives at any time.  Advance directives may include a medical power of , a living will, and a DNR or DNAR order.  This information is not intended to replace advice given to you by your health care provider. Make sure you discuss any questions you have with your health care provider.  Document Revised: 09/21/2021 Document Reviewed: 09/21/2021  [employer] Patient Education © 2022 [employer]  Smoking Tobacco Information, Adult  Smoking tobacco can be harmful to your health. Tobacco contains a poisonous (toxic), colorless chemical called nicotine. Nicotine is addictive. It changes the brain and can make it hard to stop smoking. Tobacco also has other toxic chemicals that can hurt your body and raise your risk of many cancers.  How can smoking tobacco affect me?  Smoking tobacco puts you at risk for:  Cancer. Smoking is most commonly associated with lung cancer, but can also lead to cancer in other parts of the body.  Chronic obstructive pulmonary disease (COPD). This is a long-term lung condition  that makes it hard to breathe. It also gets worse over time.  High blood pressure (hypertension), heart disease, stroke, or heart attack.  Lung infections, such as pneumonia.  Cataracts. This is when the lenses in the eyes become clouded.  Digestive problems. This may include peptic ulcers, heartburn, and gastroesophageal reflux disease (GERD).  Oral health problems, such as gum disease and tooth loss.  Loss of taste and smell.  Smoking can affect your appearance by causing:  Wrinkles.  Yellow or stained teeth, fingers, and fingernails.  Smoking tobacco can also affect your social life, because:  It may be challenging to find places to smoke when away from home. Many workplaces, restaurants, hotels, and public places are tobacco-free.  Smoking is expensive. This is due to the cost of tobacco and the long-term costs of treating health problems from smoking.  Secondhand smoke may affect those around you. Secondhand smoke can cause lung cancer, breathing problems, and heart disease. Children of smokers have a higher risk for:  Sudden infant death syndrome (SIDS).  Ear infections.  Lung infections.  If you currently smoke tobacco, quitting now can help you:  Lead a longer and healthier life.  Look, smell, breathe, and feel better over time.  Save money.  Protect others from the harms of secondhand smoke.  What actions can I take to prevent health problems?  Quit smoking    Do not start smoking. Quit if you already do.  Make a plan to quit smoking and commit to it. Look for programs to help you and ask your health care provider for recommendations and ideas.  Set a date and write down all the reasons you want to quit.  Let your friends and family know you are quitting so they can help and support you. Consider finding friends who also want to quit. It can be easier to quit with someone else, so that you can support each other.  Talk with your health care provider about using nicotine replacement medicines to help you  quit, such as gum, lozenges, patches, sprays, or pills.  Do not replace cigarette smoking with electronic cigarettes, which are commonly called e-cigarettes. The safety of e-cigarettes is not known, and some may contain harmful chemicals.  If you try to quit but return to smoking, stay positive. It is common to slip up when you first quit, so take it one day at a time.  Be prepared for cravings. When you feel the urge to smoke, chew gum or suck on hard candy.  Lifestyle  Stay busy and take care of your body.  Drink enough fluid to keep your urine pale yellow.  Get plenty of exercise and eat a healthy diet. This can help prevent weight gain after quitting.  Monitor your eating habits. Quitting smoking can cause you to have a larger appetite than when you smoke.  Find ways to relax. Go out with friends or family to a movie or a restaurant where people do not smoke.  Ask your health care provider about having regular tests (screenings) to check for cancer. This may include blood tests, imaging tests, and other tests.  Find ways to manage your stress, such as meditation, yoga, or exercise.  Where to find support  To get support to quit smoking, consider:  Asking your health care provider for more information and resources.  Taking classes to learn more about quitting smoking.  Looking for local organizations that offer resources about quitting smoking.  Joining a support group for people who want to quit smoking in your local community.  Calling the smokefree.gov counselor helpline: 1-800-Quit-Now (1-642.568.3252)  Where to find more information  You may find more information about quitting smoking from:  HelpGuide.org: www.helpguide.org  Smokefree.gov: smokefree.gov  American Lung Association: www.lung.org  Contact a health care provider if you:  Have problems breathing.  Notice that your lips, nose, or fingers turn blue.  Have chest pain.  Are coughing up blood.  Feel faint or you pass out.  Have other health changes  that cause you to worry.  Summary  Smoking tobacco can negatively affect your health, the health of those around you, your finances, and your social life.  Do not start smoking. Quit if you already do. If you need help quitting, ask your health care provider.  Think about joining a support group for people who want to quit smoking in your local community. There are many effective programs that will help you to quit this behavior.  This information is not intended to replace advice given to you by your health care provider. Make sure you discuss any questions you have with your health care provider.  Document Revised: 08/22/2022 Document Reviewed: 11/09/2021  [employer] Patient Education © 2022 [employer]  Fall Prevention in the Home, Adult  Falls can cause injuries and can happen to people of all ages. There are many things you can do to make your home safe and to help prevent falls. Ask for help when making these changes.  What actions can I take to prevent falls?  General Instructions  Use good lighting in all rooms. Replace any light bulbs that burn out.  Turn on the lights in dark areas. Use night-lights.  Keep items that you use often in easy-to-reach places. Lower the shelves around your home if needed.  Set up your furniture so you have a clear path. Avoid moving your furniture around.  Do not have throw rugs or other things on the floor that can make you trip.  Avoid walking on wet floors.  If any of your floors are uneven, fix them.  Add color or contrast paint or tape to clearly alex and help you see:  Grab bars or handrails.  First and last steps of staircases.  Where the edge of each step is.  If you use a stepladder:  Make sure that it is fully opened. Do not climb a closed stepladder.  Make sure the sides of the stepladder are locked in place.  Ask someone to hold the stepladder while you use it.  Know where your pets are when moving through your home.  What can I do in the bathroom?     Keep the  floor dry. Clean up any water on the floor right away.  Remove soap buildup in the tub or shower.  Use nonskid mats or decals on the floor of the tub or shower.  Attach bath mats securely with double-sided, nonslip rug tape.  If you need to sit down in the shower, use a plastic, nonslip stool.  Install grab bars by the toilet and in the tub and shower. Do not use towel bars as grab bars.  What can I do in the bedroom?  Make sure that you have a light by your bed that is easy to reach.  Do not use any sheets or blankets for your bed that hang to the floor.  Have a firm chair with side arms that you can use for support when you get dressed.  What can I do in the kitchen?  Clean up any spills right away.  If you need to reach something above you, use a step stool with a grab bar.  Keep electrical cords out of the way.  Do not use floor polish or wax that makes floors slippery.  What can I do with my stairs?  Do not leave any items on the stairs.  Make sure that you have a light switch at the top and the bottom of the stairs.  Make sure that there are handrails on both sides of the stairs. Fix handrails that are broken or loose.  Install nonslip stair treads on all your stairs.  Avoid having throw rugs at the top or bottom of the stairs.  Choose a carpet that does not hide the edge of the steps on the stairs.  Check carpeting to make sure that it is firmly attached to the stairs. Fix carpet that is loose or worn.  What can I do on the outside of my home?  Use bright outdoor lighting.  Fix the edges of walkways and driveways and fix any cracks.  Remove anything that might make you trip as you walk through a door, such as a raised step or threshold.  Trim any bushes or trees on paths to your home.  Check to see if handrails are loose or broken and that both sides of all steps have handrails.  Install guardrails along the edges of any raised decks and porches.  Clear paths of anything that can make you trip, such as tools  or rocks.  Have leaves, snow, or ice cleared regularly.  Use sand or salt on paths during winter.  Clean up any spills in your garage right away. This includes grease or oil spills.  What other actions can I take?  Wear shoes that:  Have a low heel. Do not wear high heels.  Have rubber bottoms.  Feel good on your feet and fit well.  Are closed at the toe. Do not wear open-toe sandals.  Use tools that help you move around if needed. These include:  Canes.  Walkers.  Scooters.  Crutches.  Review your medicines with your doctor. Some medicines can make you feel dizzy. This can increase your chance of falling.  Ask your doctor what else you can do to help prevent falls.  Where to find more information  Centers for Disease Control and PreventionKATELYN: www.cdc.gov  [employer] [place] on Aging: www.alissa.nih.gov  Contact a doctor if:  You are afraid of falling at home.  You feel weak, drowsy, or dizzy at home.  You fall at home.  Summary  There are many simple things that you can do to make your home safe and to help prevent falls.  Ways to make your home safe include removing things that can make you trip and installing grab bars in the bathroom.  Ask for help when making these changes in your home.  This information is not intended to replace advice given to you by your health care provider. Make sure you discuss any questions you have with your health care provider.  Document Revised: 07/21/2021 Document Reviewed: 07/21/2021  [employer] Patient Education © 2022 [employer]  Fat and Cholesterol Restricted Eating Plan  Getting too much fat and cholesterol in your diet may cause health problems. Choosing the right foods helps keep your fat and cholesterol at normal levels. This can keep you from getting certain diseases.  Your doctor may recommend an eating plan that includes:  Total fat: ______% or less of total calories a day. This is ______g of fat a day.  Saturated fat: ______% or less of total calories a day. This is  "______g of saturated fat a day.  Cholesterol: less than _________mg a day.  Fiber: ______g a day.  What are tips for following this plan?  General tips  Work with your doctor to lose weight if you need to.  Avoid:  Foods with added sugar.  Fried foods.  Foods with trans fat or partially hydrogenated oils. This includes some margarines and baked goods.  If you drink alcohol:  Limit how much you have to:  0-1 drink a day for women who are not pregnant.  0-2 drinks a day for men.  Know how much alcohol is in a drink. In the [place] one drink equals one 12 oz bottle of beer (355 mL), one 5 oz glass of wine (148 mL), or one 1½ oz glass of hard liquor (44 mL).  Reading food labels  Check food labels for:  Trans fats.  Partially hydrogenated oils.  Saturated fat (g) in each serving.  Cholesterol (mg) in each serving.  Fiber (g) in each serving.  Choose foods with healthy fats, such as:  Monounsaturated fats and polyunsaturated fats. These include olive and canola oil, flaxseeds, walnuts, almonds, and seeds.  Omega-3 fats. These are found in certain fish, flaxseed oil, and ground flaxseeds.  Choose grain products that have whole grains. Look for the word \"whole\" as the first word in the ingredient list.  Cooking  Cook foods using low-fat methods. These include baking, boiling, grilling, and broiling.  Eat more home-cooked foods. Eat at restaurants and buffets less often. Eat less fast food.  Avoid cooking using saturated fats, such as butter, cream, palm oil, palm kernel oil, and coconut oil.  Meal planning    At meals, divide your plate into four equal parts:  Fill one-half of your plate with vegetables, green salads, and fruit.  Fill one-fourth of your plate with whole grains.  Fill one-fourth of your plate with low-fat (lean) protein foods.  Eat fish that is high in omega-3 fats at least two times a week. This includes mackerel, tuna, sardines, and salmon.  Eat foods that are high in fiber, such as whole grains, beans, " apples, pears, berries, broccoli, carrots, peas, and barley.  What foods should I eat?  Fruits  All fresh, canned (in natural juice), or frozen fruits.  Vegetables  Fresh or frozen vegetables (raw, steamed, roasted, or grilled). Green salads.  Grains  Whole grains, such as whole wheat or whole grain breads, crackers, cereals, and pasta. Unsweetened oatmeal, bulgur, barley, quinoa, or brown rice. Corn or whole wheat flour tortillas.  Meats and other protein foods  Ground beef (85% or leaner), grass-fed beef, or beef trimmed of fat. Skinless chicken or turkey. Ground chicken or turkey. Pork trimmed of fat. All fish and seafood. Egg whites. Dried beans, peas, or lentils. Unsalted nuts or seeds. Unsalted canned beans. Nut butters without added sugar or oil.  Dairy  Low-fat or nonfat dairy products, such as skim or 1% milk, 2% or reduced-fat cheeses, low-fat and fat-free ricotta or cottage cheese, or plain low-fat and nonfat yogurt.  Fats and oils  Tub margarine without trans fats. Light or reduced-fat mayonnaise and salad dressings. Avocado. Olive, canola, sesame, or safflower oils.  The items listed above may not be a complete list of foods and beverages you can eat. Contact a dietitian for more information.  What foods should I avoid?  Fruits  Canned fruit in heavy syrup. Fruit in cream or butter sauce. Fried fruit.  Vegetables  Vegetables cooked in cheese, cream, or butter sauce. Fried vegetables.  Grains  White bread. White pasta. White rice. Cornbread. Bagels, pastries, and croissants. Crackers and snack foods that contain trans fat and hydrogenated oils.  Meats and other protein foods  Fatty cuts of meat. Ribs, chicken wings, griffin, sausage, bologna, salami, chitterlings, fatback, hot dogs, bratwurst, and packaged lunch meats. Liver and organ meats. Whole eggs and egg yolks. Chicken and turkey with skin. Fried meat.  Dairy  Whole or 2% milk, cream, half-and-half, and cream cheese. Whole milk cheeses. Whole-fat or  sweetened yogurt. Full-fat cheeses. Nondairy creamers and whipped toppings. Processed cheese, cheese spreads, and cheese curds.  Fats and oils  Butter, stick margarine, lard, shortening, ghee, or griffin fat. Coconut, palm kernel, and palm oils.  Beverages  Alcohol. Sugar-sweetened drinks such as sodas, lemonade, and fruit drinks.  Sweets and desserts  Corn syrup, sugars, honey, and molasses. Candy. Jam and jelly. Syrup. Sweetened cereals. Cookies, pies, cakes, donuts, muffins, and ice cream.  The items listed above may not be a complete list of foods and beverages you should avoid. Contact a dietitian for more information.  Summary  Choosing the right foods helps keep your fat and cholesterol at normal levels. This can keep you from getting certain diseases.  At meals, fill one-half of your plate with vegetables, green salads, and fruits.  Eat high fiber foods, like whole grains, beans, apples, pears, berries, carrots, peas, and barley.  Limit added sugar, saturated fats, alcohol, and fried foods.  This information is not intended to replace advice given to you by your health care provider. Make sure you discuss any questions you have with your health care provider.  Document Revised: 04/29/2022 Document Reviewed: 04/29/2022  [employer] Patient Education © 2022 [employer]

## 2022-10-25 ENCOUNTER — TELEPHONE (OUTPATIENT)
Dept: FAMILY MEDICINE CLINIC | Facility: CLINIC | Age: 72
End: 2022-10-25

## 2022-10-25 DIAGNOSIS — M47.25 OSTEOARTHRITIS OF SPINE WITH RADICULOPATHY, THORACOLUMBAR REGION: Chronic | ICD-10-CM

## 2022-10-25 LAB — RNAP III IGG SERPL IA-ACNC: <20 UNITS

## 2022-10-25 RX ORDER — CELECOXIB 200 MG/1
200 CAPSULE ORAL DAILY
Qty: 90 CAPSULE | Refills: 3 | Status: SHIPPED | OUTPATIENT
Start: 2022-10-25 | End: 2022-11-29 | Stop reason: SDUPTHER

## 2022-10-25 NOTE — TELEPHONE ENCOUNTER
PHONE CALL FROM PATIENT.  CELEBREX PRESCRIPTION NOT RECEIVED BY PHARMACY.  [facility]      CALL IF NEEDED

## 2022-11-29 ENCOUNTER — OFFICE VISIT (OUTPATIENT)
Dept: FAMILY MEDICINE CLINIC | Facility: CLINIC | Age: 72
End: 2022-11-29

## 2022-11-29 VITALS
SYSTOLIC BLOOD PRESSURE: 150 MMHG | HEIGHT: 71 IN | HEART RATE: 66 BPM | OXYGEN SATURATION: 94 % | DIASTOLIC BLOOD PRESSURE: 80 MMHG | WEIGHT: 216 LBS | BODY MASS INDEX: 30.24 KG/M2 | TEMPERATURE: 98.2 F

## 2022-11-29 DIAGNOSIS — R10.13 EPIGASTRIC PAIN: Primary | ICD-10-CM

## 2022-11-29 DIAGNOSIS — M47.25 OSTEOARTHRITIS OF SPINE WITH RADICULOPATHY, THORACOLUMBAR REGION: Chronic | ICD-10-CM

## 2022-11-29 DIAGNOSIS — E78.2 MIXED HYPERLIPIDEMIA: Chronic | ICD-10-CM

## 2022-11-29 PROCEDURE — 99214 OFFICE O/P EST MOD 30 MIN: CPT | Performed by: PHYSICIAN ASSISTANT

## 2022-11-29 RX ORDER — CELECOXIB 200 MG/1
200 CAPSULE ORAL DAILY
Qty: 30 CAPSULE | Refills: 5 | Status: SHIPPED | OUTPATIENT
Start: 2022-11-29 | End: 2023-01-10 | Stop reason: SDUPTHER

## 2022-11-29 RX ORDER — CELECOXIB 200 MG/1
200 CAPSULE ORAL DAILY
Qty: 90 CAPSULE | Refills: 3 | Status: SHIPPED | OUTPATIENT
Start: 2022-11-29 | End: 2022-11-29 | Stop reason: SDUPTHER

## 2022-11-30 ENCOUNTER — APPOINTMENT (OUTPATIENT)
Dept: ULTRASOUND IMAGING | Facility: HOSPITAL | Age: 72
End: 2022-11-30

## 2022-11-30 ENCOUNTER — APPOINTMENT (OUTPATIENT)
Dept: CT IMAGING | Facility: HOSPITAL | Age: 72
End: 2022-11-30

## 2022-11-30 ENCOUNTER — APPOINTMENT (OUTPATIENT)
Dept: GENERAL RADIOLOGY | Facility: HOSPITAL | Age: 72
End: 2022-11-30

## 2022-11-30 ENCOUNTER — HOSPITAL ENCOUNTER (OUTPATIENT)
Facility: HOSPITAL | Age: 72
Setting detail: OBSERVATION
Discharge: LEFT BEFORE DISCHARGE | End: 2022-12-01
Attending: EMERGENCY MEDICINE | Admitting: STUDENT IN AN ORGANIZED HEALTH CARE EDUCATION/TRAINING PROGRAM

## 2022-11-30 DIAGNOSIS — K85.90 ACUTE PANCREATITIS WITHOUT INFECTION OR NECROSIS, UNSPECIFIED PANCREATITIS TYPE: Primary | ICD-10-CM

## 2022-11-30 LAB
ALBUMIN SERPL-MCNC: 3.78 G/DL (ref 3.5–5.2)
ALBUMIN/GLOB SERPL: 1.3 G/DL
ALP SERPL-CCNC: 111 U/L (ref 39–117)
ALT SERPL W P-5'-P-CCNC: 14 U/L (ref 1–41)
AMYLASE SERPL-CCNC: 36 U/L (ref 28–100)
ANION GAP SERPL CALCULATED.3IONS-SCNC: 10.9 MMOL/L (ref 5–15)
AST SERPL-CCNC: 13 U/L (ref 1–40)
BACTERIA UR QL AUTO: ABNORMAL /HPF
BASOPHILS # BLD AUTO: 0.06 10*3/MM3 (ref 0–0.2)
BASOPHILS NFR BLD AUTO: 0.7 % (ref 0–1.5)
BILIRUB SERPL-MCNC: 0.9 MG/DL (ref 0–1.2)
BILIRUB UR QL STRIP: NEGATIVE
BUN SERPL-MCNC: 15 MG/DL (ref 8–23)
BUN/CREAT SERPL: 14.7 (ref 7–25)
CALCIUM SPEC-SCNC: 8.8 MG/DL (ref 8.6–10.5)
CHLORIDE SERPL-SCNC: 101 MMOL/L (ref 98–107)
CHOLEST SERPL-MCNC: 108 MG/DL (ref 0–200)
CLARITY UR: CLEAR
CO2 SERPL-SCNC: 24.1 MMOL/L (ref 22–29)
COLOR UR: YELLOW
CREAT SERPL-MCNC: 1.02 MG/DL (ref 0.76–1.27)
CRP SERPL-MCNC: 0.59 MG/DL (ref 0–0.5)
DEPRECATED RDW RBC AUTO: 43.7 FL (ref 37–54)
EGFRCR SERPLBLD CKD-EPI 2021: 78.6 ML/MIN/1.73
EOSINOPHIL # BLD AUTO: 0.3 10*3/MM3 (ref 0–0.4)
EOSINOPHIL NFR BLD AUTO: 3.7 % (ref 0.3–6.2)
ERYTHROCYTE [DISTWIDTH] IN BLOOD BY AUTOMATED COUNT: 13.3 % (ref 12.3–15.4)
ERYTHROCYTE [SEDIMENTATION RATE] IN BLOOD: 10 MM/HR (ref 0–20)
ETHANOL BLD-MCNC: <10 MG/DL (ref 0–10)
ETHANOL UR QL: <0.01 %
FLUAV RNA RESP QL NAA+PROBE: NOT DETECTED
FLUBV RNA RESP QL NAA+PROBE: NOT DETECTED
GLOBULIN UR ELPH-MCNC: 2.8 GM/DL
GLUCOSE SERPL-MCNC: 101 MG/DL (ref 65–99)
GLUCOSE UR STRIP-MCNC: NEGATIVE MG/DL
HCT VFR BLD AUTO: 48.1 % (ref 37.5–51)
HDLC SERPL-MCNC: 30 MG/DL (ref 40–60)
HGB BLD-MCNC: 16.1 G/DL (ref 13–17.7)
HGB UR QL STRIP.AUTO: ABNORMAL
HOLD SPECIMEN: NORMAL
HOLD SPECIMEN: NORMAL
HYALINE CASTS UR QL AUTO: ABNORMAL /LPF
IMM GRANULOCYTES # BLD AUTO: 0.04 10*3/MM3 (ref 0–0.05)
IMM GRANULOCYTES NFR BLD AUTO: 0.5 % (ref 0–0.5)
KETONES UR QL STRIP: NEGATIVE
LDLC SERPL CALC-MCNC: 55 MG/DL (ref 0–100)
LDLC/HDLC SERPL: 1.76 {RATIO}
LEUKOCYTE ESTERASE UR QL STRIP.AUTO: NEGATIVE
LIPASE SERPL-CCNC: 64 U/L (ref 13–60)
LYMPHOCYTES # BLD AUTO: 2.56 10*3/MM3 (ref 0.7–3.1)
LYMPHOCYTES NFR BLD AUTO: 31.5 % (ref 19.6–45.3)
MAGNESIUM SERPL-MCNC: 2 MG/DL (ref 1.6–2.4)
MCH RBC QN AUTO: 29.8 PG (ref 26.6–33)
MCHC RBC AUTO-ENTMCNC: 33.5 G/DL (ref 31.5–35.7)
MCV RBC AUTO: 88.9 FL (ref 79–97)
MONOCYTES # BLD AUTO: 0.79 10*3/MM3 (ref 0.1–0.9)
MONOCYTES NFR BLD AUTO: 9.7 % (ref 5–12)
NEUTROPHILS NFR BLD AUTO: 4.37 10*3/MM3 (ref 1.7–7)
NEUTROPHILS NFR BLD AUTO: 53.9 % (ref 42.7–76)
NITRITE UR QL STRIP: NEGATIVE
NRBC BLD AUTO-RTO: 0 /100 WBC (ref 0–0.2)
PH UR STRIP.AUTO: 6 [PH] (ref 5–8)
PLATELET # BLD AUTO: 316 10*3/MM3 (ref 140–450)
PMV BLD AUTO: 9.5 FL (ref 6–12)
POTASSIUM SERPL-SCNC: 4.4 MMOL/L (ref 3.5–5.2)
PROT SERPL-MCNC: 6.6 G/DL (ref 6–8.5)
PROT UR QL STRIP: NEGATIVE
QT INTERVAL: 448 MS
QTC INTERVAL: 465 MS
RBC # BLD AUTO: 5.41 10*6/MM3 (ref 4.14–5.8)
RBC # UR STRIP: ABNORMAL /HPF
REF LAB TEST METHOD: ABNORMAL
SARS-COV-2 RNA RESP QL NAA+PROBE: NOT DETECTED
SODIUM SERPL-SCNC: 136 MMOL/L (ref 136–145)
SP GR UR STRIP: >1.03 (ref 1–1.03)
SQUAMOUS #/AREA URNS HPF: ABNORMAL /HPF
TRIGL SERPL-MCNC: 126 MG/DL (ref 0–150)
TROPONIN T SERPL-MCNC: <0.01 NG/ML (ref 0–0.03)
TROPONIN T SERPL-MCNC: <0.01 NG/ML (ref 0–0.03)
UROBILINOGEN UR QL STRIP: ABNORMAL
VLDLC SERPL-MCNC: 23 MG/DL (ref 5–40)
WBC # UR STRIP: ABNORMAL /HPF
WBC NRBC COR # BLD: 8.12 10*3/MM3 (ref 3.4–10.8)
WHOLE BLOOD HOLD COAG: NORMAL
WHOLE BLOOD HOLD SPECIMEN: NORMAL

## 2022-11-30 PROCEDURE — C9803 HOPD COVID-19 SPEC COLLECT: HCPCS

## 2022-11-30 PROCEDURE — 36415 COLL VENOUS BLD VENIPUNCTURE: CPT

## 2022-11-30 PROCEDURE — G0378 HOSPITAL OBSERVATION PER HR: HCPCS

## 2022-11-30 PROCEDURE — 81001 URINALYSIS AUTO W/SCOPE: CPT | Performed by: PHYSICIAN ASSISTANT

## 2022-11-30 PROCEDURE — 83690 ASSAY OF LIPASE: CPT | Performed by: PHYSICIAN ASSISTANT

## 2022-11-30 PROCEDURE — 25010000002 HYDROMORPHONE PER 4 MG: Performed by: STUDENT IN AN ORGANIZED HEALTH CARE EDUCATION/TRAINING PROGRAM

## 2022-11-30 PROCEDURE — 85652 RBC SED RATE AUTOMATED: CPT | Performed by: PHYSICIAN ASSISTANT

## 2022-11-30 PROCEDURE — 74177 CT ABD & PELVIS W/CONTRAST: CPT | Performed by: RADIOLOGY

## 2022-11-30 PROCEDURE — 87636 SARSCOV2 & INF A&B AMP PRB: CPT | Performed by: PHYSICIAN ASSISTANT

## 2022-11-30 PROCEDURE — 96361 HYDRATE IV INFUSION ADD-ON: CPT

## 2022-11-30 PROCEDURE — 96374 THER/PROPH/DIAG INJ IV PUSH: CPT

## 2022-11-30 PROCEDURE — 86140 C-REACTIVE PROTEIN: CPT | Performed by: PHYSICIAN ASSISTANT

## 2022-11-30 PROCEDURE — 71275 CT ANGIOGRAPHY CHEST: CPT | Performed by: RADIOLOGY

## 2022-11-30 PROCEDURE — 82077 ASSAY SPEC XCP UR&BREATH IA: CPT | Performed by: STUDENT IN AN ORGANIZED HEALTH CARE EDUCATION/TRAINING PROGRAM

## 2022-11-30 PROCEDURE — 94640 AIRWAY INHALATION TREATMENT: CPT

## 2022-11-30 PROCEDURE — 83735 ASSAY OF MAGNESIUM: CPT | Performed by: PHYSICIAN ASSISTANT

## 2022-11-30 PROCEDURE — 99220 PR INITIAL OBSERVATION CARE/DAY 70 MINUTES: CPT | Performed by: STUDENT IN AN ORGANIZED HEALTH CARE EDUCATION/TRAINING PROGRAM

## 2022-11-30 PROCEDURE — 71045 X-RAY EXAM CHEST 1 VIEW: CPT | Performed by: RADIOLOGY

## 2022-11-30 PROCEDURE — 94799 UNLISTED PULMONARY SVC/PX: CPT

## 2022-11-30 PROCEDURE — 74177 CT ABD & PELVIS W/CONTRAST: CPT

## 2022-11-30 PROCEDURE — 94761 N-INVAS EAR/PLS OXIMETRY MLT: CPT

## 2022-11-30 PROCEDURE — 25010000002 HEPARIN (PORCINE) PER 1000 UNITS: Performed by: STUDENT IN AN ORGANIZED HEALTH CARE EDUCATION/TRAINING PROGRAM

## 2022-11-30 PROCEDURE — 82150 ASSAY OF AMYLASE: CPT | Performed by: PHYSICIAN ASSISTANT

## 2022-11-30 PROCEDURE — 84484 ASSAY OF TROPONIN QUANT: CPT | Performed by: PHYSICIAN ASSISTANT

## 2022-11-30 PROCEDURE — 71045 X-RAY EXAM CHEST 1 VIEW: CPT

## 2022-11-30 PROCEDURE — 80053 COMPREHEN METABOLIC PANEL: CPT | Performed by: PHYSICIAN ASSISTANT

## 2022-11-30 PROCEDURE — 76705 ECHO EXAM OF ABDOMEN: CPT | Performed by: RADIOLOGY

## 2022-11-30 PROCEDURE — 96375 TX/PRO/DX INJ NEW DRUG ADDON: CPT

## 2022-11-30 PROCEDURE — 25010000002 KETOROLAC TROMETHAMINE PER 15 MG: Performed by: STUDENT IN AN ORGANIZED HEALTH CARE EDUCATION/TRAINING PROGRAM

## 2022-11-30 PROCEDURE — 76705 ECHO EXAM OF ABDOMEN: CPT

## 2022-11-30 PROCEDURE — 85025 COMPLETE CBC W/AUTO DIFF WBC: CPT | Performed by: PHYSICIAN ASSISTANT

## 2022-11-30 PROCEDURE — 80061 LIPID PANEL: CPT | Performed by: STUDENT IN AN ORGANIZED HEALTH CARE EDUCATION/TRAINING PROGRAM

## 2022-11-30 PROCEDURE — 93010 ELECTROCARDIOGRAM REPORT: CPT | Performed by: INTERNAL MEDICINE

## 2022-11-30 PROCEDURE — 71275 CT ANGIOGRAPHY CHEST: CPT

## 2022-11-30 PROCEDURE — 93005 ELECTROCARDIOGRAM TRACING: CPT | Performed by: PHYSICIAN ASSISTANT

## 2022-11-30 PROCEDURE — 99284 EMERGENCY DEPT VISIT MOD MDM: CPT

## 2022-11-30 PROCEDURE — 0 IOPAMIDOL PER 1 ML: Performed by: EMERGENCY MEDICINE

## 2022-11-30 PROCEDURE — 96372 THER/PROPH/DIAG INJ SC/IM: CPT

## 2022-11-30 RX ORDER — SODIUM CHLORIDE 0.9 % (FLUSH) 0.9 %
10 SYRINGE (ML) INJECTION AS NEEDED
Status: DISCONTINUED | OUTPATIENT
Start: 2022-11-30 | End: 2022-12-01 | Stop reason: HOSPADM

## 2022-11-30 RX ORDER — ONDANSETRON 2 MG/ML
4 INJECTION INTRAMUSCULAR; INTRAVENOUS EVERY 6 HOURS PRN
Status: DISCONTINUED | OUTPATIENT
Start: 2022-11-30 | End: 2022-12-01 | Stop reason: HOSPADM

## 2022-11-30 RX ORDER — SODIUM CHLORIDE 9 MG/ML
100 INJECTION, SOLUTION INTRAVENOUS CONTINUOUS
Status: DISCONTINUED | OUTPATIENT
Start: 2022-11-30 | End: 2022-12-01 | Stop reason: HOSPADM

## 2022-11-30 RX ORDER — HEPARIN SODIUM 5000 [USP'U]/ML
5000 INJECTION, SOLUTION INTRAVENOUS; SUBCUTANEOUS EVERY 8 HOURS SCHEDULED
Status: DISCONTINUED | OUTPATIENT
Start: 2022-11-30 | End: 2022-12-01 | Stop reason: HOSPADM

## 2022-11-30 RX ORDER — IPRATROPIUM BROMIDE AND ALBUTEROL SULFATE 2.5; .5 MG/3ML; MG/3ML
3 SOLUTION RESPIRATORY (INHALATION)
Status: DISCONTINUED | OUTPATIENT
Start: 2022-11-30 | End: 2022-12-01 | Stop reason: HOSPADM

## 2022-11-30 RX ORDER — PANTOPRAZOLE SODIUM 40 MG/10ML
40 INJECTION, POWDER, LYOPHILIZED, FOR SOLUTION INTRAVENOUS
Status: DISCONTINUED | OUTPATIENT
Start: 2022-11-30 | End: 2022-12-01 | Stop reason: HOSPADM

## 2022-11-30 RX ORDER — NICOTINE 21 MG/24HR
1 PATCH, TRANSDERMAL 24 HOURS TRANSDERMAL
Status: DISCONTINUED | OUTPATIENT
Start: 2022-11-30 | End: 2022-12-01 | Stop reason: HOSPADM

## 2022-11-30 RX ORDER — FLUTICASONE PROPIONATE AND SALMETEROL 250; 50 UG/1; UG/1
1 POWDER RESPIRATORY (INHALATION)
COMMUNITY

## 2022-11-30 RX ORDER — SODIUM CHLORIDE 0.9 % (FLUSH) 0.9 %
10 SYRINGE (ML) INJECTION EVERY 12 HOURS SCHEDULED
Status: DISCONTINUED | OUTPATIENT
Start: 2022-11-30 | End: 2022-12-01 | Stop reason: HOSPADM

## 2022-11-30 RX ORDER — GABAPENTIN 400 MG/1
400 CAPSULE ORAL 3 TIMES DAILY
Status: CANCELLED | OUTPATIENT
Start: 2022-11-30

## 2022-11-30 RX ORDER — MELATONIN
2000 DAILY
Status: ON HOLD | COMMUNITY
End: 2022-11-30

## 2022-11-30 RX ORDER — LOSARTAN POTASSIUM 25 MG/1
25 TABLET ORAL DAILY
Status: CANCELLED | OUTPATIENT
Start: 2022-12-01

## 2022-11-30 RX ORDER — HYDROCODONE BITARTRATE AND ACETAMINOPHEN 10; 325 MG/1; MG/1
1 TABLET ORAL 3 TIMES DAILY
Status: CANCELLED | OUTPATIENT
Start: 2022-11-30

## 2022-11-30 RX ORDER — KETOROLAC TROMETHAMINE 30 MG/ML
15 INJECTION, SOLUTION INTRAMUSCULAR; INTRAVENOUS ONCE
Status: COMPLETED | OUTPATIENT
Start: 2022-11-30 | End: 2022-11-30

## 2022-11-30 RX ORDER — BUDESONIDE AND FORMOTEROL FUMARATE DIHYDRATE 160; 4.5 UG/1; UG/1
2 AEROSOL RESPIRATORY (INHALATION)
Status: CANCELLED | OUTPATIENT
Start: 2022-11-30

## 2022-11-30 RX ORDER — ALBUTEROL SULFATE 2.5 MG/3ML
2.5 SOLUTION RESPIRATORY (INHALATION) EVERY 4 HOURS PRN
Status: CANCELLED | OUTPATIENT
Start: 2022-11-30

## 2022-11-30 RX ORDER — ASPIRIN 81 MG/1
81 TABLET ORAL 2 TIMES DAILY
Status: CANCELLED | OUTPATIENT
Start: 2022-11-30

## 2022-11-30 RX ORDER — MORPHINE SULFATE 2 MG/ML
1 INJECTION, SOLUTION INTRAMUSCULAR; INTRAVENOUS EVERY 4 HOURS PRN
Status: DISCONTINUED | OUTPATIENT
Start: 2022-11-30 | End: 2022-12-01 | Stop reason: HOSPADM

## 2022-11-30 RX ORDER — NALOXONE HCL 0.4 MG/ML
0.4 VIAL (ML) INJECTION
Status: DISCONTINUED | OUTPATIENT
Start: 2022-11-30 | End: 2022-12-01 | Stop reason: HOSPADM

## 2022-11-30 RX ORDER — CELECOXIB 100 MG/1
200 CAPSULE ORAL DAILY
Status: CANCELLED | OUTPATIENT
Start: 2022-11-30

## 2022-11-30 RX ORDER — PANTOPRAZOLE SODIUM 40 MG/1
40 TABLET, DELAYED RELEASE ORAL 2 TIMES DAILY
Status: CANCELLED | OUTPATIENT
Start: 2022-11-30

## 2022-11-30 RX ORDER — ATORVASTATIN CALCIUM 20 MG/1
20 TABLET, FILM COATED ORAL NIGHTLY
Status: CANCELLED | OUTPATIENT
Start: 2022-11-30

## 2022-11-30 RX ORDER — LANOLIN ALCOHOL/MO/W.PET/CERES
1000 CREAM (GRAM) TOPICAL DAILY
Status: ON HOLD | COMMUNITY
End: 2022-11-30

## 2022-11-30 RX ORDER — ASPIRIN 81 MG/1
81 TABLET ORAL 2 TIMES DAILY
COMMUNITY

## 2022-11-30 RX ORDER — RANOLAZINE 500 MG/1
500 TABLET, EXTENDED RELEASE ORAL 2 TIMES DAILY
Status: CANCELLED | OUTPATIENT
Start: 2022-11-30

## 2022-11-30 RX ORDER — HYDROMORPHONE HYDROCHLORIDE 1 MG/ML
0.5 INJECTION, SOLUTION INTRAMUSCULAR; INTRAVENOUS; SUBCUTANEOUS
Status: DISCONTINUED | OUTPATIENT
Start: 2022-11-30 | End: 2022-12-01 | Stop reason: HOSPADM

## 2022-11-30 RX ORDER — SODIUM CHLORIDE 9 MG/ML
40 INJECTION, SOLUTION INTRAVENOUS AS NEEDED
Status: DISCONTINUED | OUTPATIENT
Start: 2022-11-30 | End: 2022-12-01 | Stop reason: HOSPADM

## 2022-11-30 RX ADMIN — IOPAMIDOL 85 ML: 755 INJECTION, SOLUTION INTRAVENOUS at 13:20

## 2022-11-30 RX ADMIN — KETOROLAC TROMETHAMINE 15 MG: 30 INJECTION, SOLUTION INTRAMUSCULAR; INTRAVENOUS at 17:54

## 2022-11-30 RX ADMIN — Medication 10 ML: at 21:04

## 2022-11-30 RX ADMIN — SODIUM CHLORIDE 100 ML/HR: 9 INJECTION, SOLUTION INTRAVENOUS at 16:07

## 2022-11-30 RX ADMIN — SODIUM CHLORIDE 100 ML/HR: 9 INJECTION, SOLUTION INTRAVENOUS at 13:31

## 2022-11-30 RX ADMIN — PANTOPRAZOLE SODIUM 40 MG: 40 INJECTION, POWDER, FOR SOLUTION INTRAVENOUS at 21:04

## 2022-11-30 RX ADMIN — HEPARIN SODIUM 5000 UNITS: 5000 INJECTION INTRAVENOUS; SUBCUTANEOUS at 21:04

## 2022-11-30 RX ADMIN — IPRATROPIUM BROMIDE AND ALBUTEROL SULFATE 3 ML: .5; 2.5 SOLUTION RESPIRATORY (INHALATION) at 21:30

## 2022-11-30 RX ADMIN — HEPARIN SODIUM 5000 UNITS: 5000 INJECTION INTRAVENOUS; SUBCUTANEOUS at 17:16

## 2022-11-30 RX ADMIN — NICOTINE 1 PATCH: 14 PATCH, EXTENDED RELEASE TRANSDERMAL at 17:54

## 2022-11-30 RX ADMIN — HYDROMORPHONE HYDROCHLORIDE 0.5 MG: 1 INJECTION, SOLUTION INTRAMUSCULAR; INTRAVENOUS; SUBCUTANEOUS at 21:03

## 2022-12-01 VITALS
DIASTOLIC BLOOD PRESSURE: 88 MMHG | BODY MASS INDEX: 30.18 KG/M2 | SYSTOLIC BLOOD PRESSURE: 168 MMHG | WEIGHT: 215.6 LBS | OXYGEN SATURATION: 96 % | HEART RATE: 76 BPM | RESPIRATION RATE: 18 BRPM | HEIGHT: 71 IN | TEMPERATURE: 97.9 F

## 2022-12-01 LAB
ALBUMIN SERPL-MCNC: 3.26 G/DL (ref 3.5–5.2)
ALBUMIN/GLOB SERPL: 1.3 G/DL
ALP SERPL-CCNC: 96 U/L (ref 39–117)
ALT SERPL W P-5'-P-CCNC: 12 U/L (ref 1–41)
ANION GAP SERPL CALCULATED.3IONS-SCNC: 9.8 MMOL/L (ref 5–15)
AST SERPL-CCNC: 10 U/L (ref 1–40)
BASOPHILS # BLD MANUAL: 0.14 10*3/MM3 (ref 0–0.2)
BASOPHILS NFR BLD MANUAL: 2 % (ref 0–1.5)
BILIRUB SERPL-MCNC: 1 MG/DL (ref 0–1.2)
BUN SERPL-MCNC: 12 MG/DL (ref 8–23)
BUN/CREAT SERPL: 13.5 (ref 7–25)
CALCIUM SPEC-SCNC: 8.3 MG/DL (ref 8.6–10.5)
CHLORIDE SERPL-SCNC: 105 MMOL/L (ref 98–107)
CO2 SERPL-SCNC: 21.2 MMOL/L (ref 22–29)
CREAT SERPL-MCNC: 0.89 MG/DL (ref 0.76–1.27)
DEPRECATED RDW RBC AUTO: 43.8 FL (ref 37–54)
EGFRCR SERPLBLD CKD-EPI 2021: 91.6 ML/MIN/1.73
EOSINOPHIL # BLD MANUAL: 0.41 10*3/MM3 (ref 0–0.4)
EOSINOPHIL NFR BLD MANUAL: 6 % (ref 0.3–6.2)
ERYTHROCYTE [DISTWIDTH] IN BLOOD BY AUTOMATED COUNT: 13.4 % (ref 12.3–15.4)
GLOBULIN UR ELPH-MCNC: 2.4 GM/DL
GLUCOSE SERPL-MCNC: 84 MG/DL (ref 65–99)
HCT VFR BLD AUTO: 42.3 % (ref 37.5–51)
HGB BLD-MCNC: 14.2 G/DL (ref 13–17.7)
LYMPHOCYTES # BLD MANUAL: 2.58 10*3/MM3 (ref 0.7–3.1)
LYMPHOCYTES NFR BLD MANUAL: 10 % (ref 5–12)
MCH RBC QN AUTO: 30.3 PG (ref 26.6–33)
MCHC RBC AUTO-ENTMCNC: 33.6 G/DL (ref 31.5–35.7)
MCV RBC AUTO: 90.2 FL (ref 79–97)
MONOCYTES # BLD: 0.68 10*3/MM3 (ref 0.1–0.9)
NEUTROPHILS # BLD AUTO: 2.99 10*3/MM3 (ref 1.7–7)
NEUTROPHILS NFR BLD MANUAL: 42 % (ref 42.7–76)
NEUTS BAND NFR BLD MANUAL: 2 % (ref 0–5)
PLAT MORPH BLD: NORMAL
PLATELET # BLD AUTO: 261 10*3/MM3 (ref 140–450)
PMV BLD AUTO: 9.4 FL (ref 6–12)
POTASSIUM SERPL-SCNC: 3.8 MMOL/L (ref 3.5–5.2)
PROT SERPL-MCNC: 5.7 G/DL (ref 6–8.5)
RBC # BLD AUTO: 4.69 10*6/MM3 (ref 4.14–5.8)
RBC MORPH BLD: NORMAL
SODIUM SERPL-SCNC: 136 MMOL/L (ref 136–145)
VARIANT LYMPHS NFR BLD MANUAL: 38 % (ref 19.6–45.3)
WBC NRBC COR # BLD: 6.8 10*3/MM3 (ref 3.4–10.8)

## 2022-12-01 PROCEDURE — 25010000002 HYDROMORPHONE PER 4 MG: Performed by: STUDENT IN AN ORGANIZED HEALTH CARE EDUCATION/TRAINING PROGRAM

## 2022-12-01 PROCEDURE — 96372 THER/PROPH/DIAG INJ SC/IM: CPT

## 2022-12-01 PROCEDURE — 94761 N-INVAS EAR/PLS OXIMETRY MLT: CPT

## 2022-12-01 PROCEDURE — 85027 COMPLETE CBC AUTOMATED: CPT | Performed by: STUDENT IN AN ORGANIZED HEALTH CARE EDUCATION/TRAINING PROGRAM

## 2022-12-01 PROCEDURE — 99217 PR OBSERVATION CARE DISCHARGE MANAGEMENT: CPT | Performed by: STUDENT IN AN ORGANIZED HEALTH CARE EDUCATION/TRAINING PROGRAM

## 2022-12-01 PROCEDURE — G0378 HOSPITAL OBSERVATION PER HR: HCPCS

## 2022-12-01 PROCEDURE — 25010000002 HEPARIN (PORCINE) PER 1000 UNITS: Performed by: STUDENT IN AN ORGANIZED HEALTH CARE EDUCATION/TRAINING PROGRAM

## 2022-12-01 PROCEDURE — 96376 TX/PRO/DX INJ SAME DRUG ADON: CPT

## 2022-12-01 PROCEDURE — 94799 UNLISTED PULMONARY SVC/PX: CPT

## 2022-12-01 PROCEDURE — 85007 BL SMEAR W/DIFF WBC COUNT: CPT | Performed by: STUDENT IN AN ORGANIZED HEALTH CARE EDUCATION/TRAINING PROGRAM

## 2022-12-01 PROCEDURE — 80053 COMPREHEN METABOLIC PANEL: CPT | Performed by: STUDENT IN AN ORGANIZED HEALTH CARE EDUCATION/TRAINING PROGRAM

## 2022-12-01 PROCEDURE — 96361 HYDRATE IV INFUSION ADD-ON: CPT

## 2022-12-01 RX ADMIN — IPRATROPIUM BROMIDE AND ALBUTEROL SULFATE 3 ML: .5; 2.5 SOLUTION RESPIRATORY (INHALATION) at 13:30

## 2022-12-01 RX ADMIN — Medication 10 ML: at 08:05

## 2022-12-01 RX ADMIN — SODIUM CHLORIDE 100 ML/HR: 9 INJECTION, SOLUTION INTRAVENOUS at 01:20

## 2022-12-01 RX ADMIN — IPRATROPIUM BROMIDE AND ALBUTEROL SULFATE 3 ML: .5; 2.5 SOLUTION RESPIRATORY (INHALATION) at 01:28

## 2022-12-01 RX ADMIN — IPRATROPIUM BROMIDE AND ALBUTEROL SULFATE 3 ML: .5; 2.5 SOLUTION RESPIRATORY (INHALATION) at 07:40

## 2022-12-01 RX ADMIN — IPRATROPIUM BROMIDE AND ALBUTEROL SULFATE 3 ML: .5; 2.5 SOLUTION RESPIRATORY (INHALATION) at 19:55

## 2022-12-01 RX ADMIN — NICOTINE 1 PATCH: 14 PATCH, EXTENDED RELEASE TRANSDERMAL at 08:06

## 2022-12-01 RX ADMIN — HEPARIN SODIUM 5000 UNITS: 5000 INJECTION INTRAVENOUS; SUBCUTANEOUS at 05:38

## 2022-12-01 RX ADMIN — PANTOPRAZOLE SODIUM 40 MG: 40 INJECTION, POWDER, FOR SOLUTION INTRAVENOUS at 05:38

## 2022-12-01 RX ADMIN — SODIUM CHLORIDE 100 ML/HR: 9 INJECTION, SOLUTION INTRAVENOUS at 11:51

## 2022-12-01 RX ADMIN — HYDROMORPHONE HYDROCHLORIDE 0.5 MG: 1 INJECTION, SOLUTION INTRAMUSCULAR; INTRAVENOUS; SUBCUTANEOUS at 12:11

## 2022-12-01 RX ADMIN — HEPARIN SODIUM 5000 UNITS: 5000 INJECTION INTRAVENOUS; SUBCUTANEOUS at 13:09

## 2022-12-01 RX ADMIN — HYDROMORPHONE HYDROCHLORIDE 0.5 MG: 1 INJECTION, SOLUTION INTRAMUSCULAR; INTRAVENOUS; SUBCUTANEOUS at 05:49

## 2022-12-01 RX ADMIN — HYDROMORPHONE HYDROCHLORIDE 0.5 MG: 1 INJECTION, SOLUTION INTRAMUSCULAR; INTRAVENOUS; SUBCUTANEOUS at 01:24

## 2022-12-01 NOTE — PLAN OF CARE
Goal Outcome Evaluation:  Plan of Care Reviewed With: patient        Progress: improving  Outcome Evaluation: Patient has ambulated in room. Pt's diet was advanced per orders; pt is tolerating diet well. Pt had c/o pain; prn meds given. No other changes to note at this time; will continue to monitor

## 2022-12-01 NOTE — PROGRESS NOTES
[facility] HOSPITALIST PROGRESS NOTE     Patient Identification:  Name:  Bryson Baker  Age:  72 y.o.  Sex:  male  :  1950  MRN:  [id number]  Visit Number:  [id number]  ROOM: [address] [place] [address]     Primary Care Provider:  Rita Orr PA    Length of stay in inpatient status:  0    Subjective     Chief Compliant:    Chief Complaint   Patient presents with   • Abdominal Pain       History of Presenting Illness:    Follow-up for acute pancreatitis.  No acute events reported overnight.  He has remained n.p.o.  This morning, he feels like his pain is mildly improved, rates it a 5 out of 10 epigastric and left upper quadrant pain.  We discussed his laboratory results including his triglyceride and alcohol level being normal.  He is anxious to out of the hospital, stating that he has to help take care of his grandson at home.  He would like to try clear liquids so that we can hopefully advance his diet from there.  Possible discharge in a.m.    Objective     Current Hospital Meds:heparin (porcine), 5,000 Units, Subcutaneous, Q8H  ipratropium-albuterol, 3 mL, Nebulization, 4x Daily - RT  nicotine, 1 patch, Transdermal, Q24H  pantoprazole, 40 mg, Intravenous, Q AM  sodium chloride, 10 mL, Intravenous, Q12H    sodium chloride, 100 mL/hr, Last Rate: Stopped (220)  sodium chloride, 100 mL/hr, Last Rate: 100 mL/hr (22 1151)        Current Antimicrobial Therapy:  Anti-Infectives (From admission, onward)    None        Current Diuretic Therapy:  Diuretics (From admission, onward)    None        ----------------------------------------------------------------------------------------------------------------------  Vital Signs:  Temp:  [97.6 °F (36.4 °C)-98 °F (36.7 °C)] 97.8 °F (36.6 °C)  Heart Rate:  [55-68] 60  Resp:  [18-20] 18  BP: (144-164)/(78-93) 155/86  SpO2:  [93 %-97 %] 94 %  on   ;   Device (Oxygen Therapy): room air  Body mass index is 30.07 kg/m².    Wt Readings from Last 3  Encounters:   11/30/22 97.8 kg (215 lb 9.6 oz)   11/29/22 98 kg (216 lb)   10/17/22 100 kg (221 lb)     Intake & Output (last 3 days)       11/28 0701 11/29 0700 11/29 0701 11/30 0700 11/30 0701 12/01 0700 12/01 0701 12/02 0700    I.V. (mL/kg)   1338.1 (13.7)     Total Intake(mL/kg)   1338.1 (13.7)     Net   +1338.1             Urine Unmeasured Occurrence   1 x         Diet: Liquid Diets; Clear Liquid; Texture: Regular Texture (IDDSI 7); Fluid Consistency: Thin (IDDSI 0)  ----------------------------------------------------------------------------------------------------------------------  Physical exam:  Constitutional:  Resting comfortably  in NAD   HENT:  Head: Normocephalic and atraumatic.  Mouth:  Dry mucous membranes.    Eyes:  Conjunctivae and EOM are normal.  Pupils are equal, round, and reactive to light.  No scleral icterus.  Neck:  Neck supple.  No JVD present.    Cardiovascular:  Normal rate, regular rhythm and normal heart sounds with no murmur.  Pulmonary/Chest:  No respiratory distress, no wheezes, no crackles, with normal breath sounds and good air movement.  Abdominal:  Soft. Tender in epigastrium and LUQ, midline hernia is noted and reducible.  Bowel sounds are normal.  No distension  Musculoskeletal:  No edema, no tenderness, and no deformity.  No red or swollen joints anywhere.    Neurological:  Alert and oriented to person, place, and time.  No cranial nerve deficit.  No tongue deviation.  No facial droop.  No slurred speech.   Skin:  Skin is warm and dry.  No rash noted.  No pallor.   Psychiatric:  Normal mood and affect.  Behavior is normal.  Judgment and thought content normal.   Peripheral vascular:  No edema and strong   ----------------------------------------------------------------------------------------------------------------------  Results from last 7 days   Lab Units 12/01/22  0116 11/30/22  1117   CRP mg/dL  --  0.59*   WBC 10*3/mm3 6.80 8.12   HEMOGLOBIN g/dL 14.2 16.1    HEMATOCRIT % 42.3 48.1   MCV fL 90.2 88.9   MCHC g/dL 33.6 33.5   PLATELETS 10*3/mm3 261 316         Results from last 7 days   Lab Units 12/01/22  0116 11/30/22  1117   SODIUM mmol/L 136 136   POTASSIUM mmol/L 3.8 4.4   MAGNESIUM mg/dL  --  2.0   CHLORIDE mmol/L 105 101   CO2 mmol/L 21.2* 24.1   BUN mg/dL 12 15   CREATININE mg/dL 0.89 1.02   CALCIUM mg/dL 8.3* 8.8   GLUCOSE mg/dL 84 101*   ALBUMIN g/dL 3.26* 3.78   BILIRUBIN mg/dL 1.0 0.9   ALK PHOS U/L 96 111   AST (SGOT) U/L 10 13   ALT (SGPT) U/L 12 14   Estimated Creatinine Clearance: 90.8 mL/min (by C-G formula based on SCr of 0.89 mg/dL).  No results found for: AMMONIA  Results from last 7 days   Lab Units 11/30/22  1324 11/30/22  1117   TROPONIN T ng/mL <0.010 <0.010         Results from last 7 days   Lab Units 11/30/22  1324   CHOLESTEROL mg/dL 108   TRIGLYCERIDES mg/dL 126   HDL CHOL mg/dL 30*   LDL CHOL mg/dL 55     No results found for: HGBA1C, POCGLU  Lab Results   Component Value Date    TSH 1.610 10/18/2022    FREET4 1.29 02/18/2014     No results found for: PREGTESTUR, PREGSERUM, HCG, HCGQUANT  Pain Management Panel     Pain Management Panel Latest Ref Rng & Units 8/31/2018    AMPHETAMINES SCREEN, URINE Negative Negative    BARBITURATES SCREEN Negative Negative    BENZODIAZEPINE SCREEN, URINE Negative Negative    BUPRENORPHINEUR Negative Negative    COCAINE SCREEN, URINE Negative Negative    METHADONE SCREEN, URINE Negative Negative        Brief Urine Lab Results  (Last result in the past 365 days)      Color   Clarity   Blood   Leuk Est   Nitrite   Protein   CREAT   Urine HCG        11/30/22 1343 Yellow   Clear   Trace   Negative   Negative   Negative               No results found for: BLOODCX  No results found for: URINECX  No results found for: WOUNDCX  No results found for: STOOLCX  No results found for: RESPCX  No results found for: AFBCX  Results from last 7 days   Lab Units 11/30/22  1117   SED RATE mm/hr 10   CRP mg/dL 0.59*       I have  personally looked at the labs and they are summarized above.  ----------------------------------------------------------------------------------------------------------------------  Detailed radiology reports for the last 24 hours:    Imaging Results (Last 24 Hours)     Procedure Component Value Units Date/Time    CT Angiogram Chest Pulmonary Embolism [[id number]] Collected: 11/30/22 1318     Updated: 11/30/22 1323    Narrative:      EXAM:    CT Angiography Chest With Intravenous Contrast     EXAM DATE:    11/30/2022 12:44 PM     CLINICAL HISTORY:    left lower chest wall pain     TECHNIQUE:    Axial computed tomographic angiography images of the chest with  intravenous contrast.  This CT exam was performed using one or more of  the following dose reduction techniques:  automated exposure control,  adjustment of the mA and/or kV according to patient size, and/or use of  iterative reconstruction technique.    MIP reconstructed images were created and reviewed.     COMPARISON:    08/29/2022     FINDINGS:    Pulmonary arteries:  No evidence of PE.    Aorta:  Mild atherosclerosis aorta.  No thoracic aortic aneurysm.    Lungs:  Central bronchial wall thickening.  Mild interstitial  thickening likely chronic interstitial lung changes.  No mass. Stable  small bilateral pulmonary nodules.    Pleural space:  No pleural effusions.  No pneumothorax.    Heart:  Mild cardiomegaly.  Mild coronary artery calcifications.  No  significant pericardial effusion.  No evidence of RV dysfunction.    Mediastinum:  No mediastinal or hilar lymphadenopathy.    Bones/joints:  No acute fracture.  No dislocation.    Soft tissues:  Unremarkable.    Lymph nodes:  See above.    Pancreas:  Inflammation of the pancreas compatible with acute  pancreatitis.    Other findings:  Scattered calcified granulomas.       Impression:      1.  No PE.  2.  Cardiomegaly and chronic appearing interstitial lung changes.  3.  Acute pancreatitis.  4. Stable small  bilateral pulmonary nodules. Otherwise no acute thoracic  findings noted.     This report was finalized on 11/30/2022 1:20 PM by Dr. Juan Ramon Wright MD.       CT Abdomen Pelvis With Contrast [[id number]] Collected: 11/30/22 1315     Updated: 11/30/22 1321    Narrative:      EXAM:    CT Abdomen and Pelvis With Intravenous Contrast     EXAM DATE:    11/30/2022 12:44 PM     CLINICAL HISTORY:    LUQ pain     TECHNIQUE:    Axial computed tomography images of the abdomen and pelvis with  intravenous contrast.  Sagittal and coronal reformatted images were  created and reviewed.  This CT exam was performed using one or more of  the following dose reduction techniques:  automated exposure control,  adjustment of the mA and/or kV according to patient size, and/or use of  iterative reconstruction technique.     COMPARISON:    No relevant prior studies available.     FINDINGS:    Lung bases:  Unremarkable.  No mass.  No consolidation.      ABDOMEN:    Liver:  Mild diffuse fatty infiltration of liver.    Gallbladder and bile ducts:  Unremarkable.  No calcified stones.  No  ductal dilation.    Pancreas:  Inflammation surrounding the mid body and tail of the  pancreas compatible with acute pancreatitis.  No pancreatic hemorrhage  or pseudocyst identified.  No ductal dilation.    Spleen:  Unremarkable.  No splenomegaly.    Adrenals:  Unremarkable.  No mass.    Kidneys and ureters:  No renal or ureteral stones. No hydronephrosis  identified.    Stomach and bowel:  Sigmoid diverticulosis without evidence of acute  diverticulitis.  No bowel obstruction.      PELVIS:    Appendix:  Normal appendix.    Bladder:  Unremarkable.  No mass.    Reproductive:  Prostate enlargement.      ABDOMEN and PELVIS:    Intraperitoneal space:  No pneumoperitoneum.  No significant fluid  collection.    Bones/joints:  L4 pars defects with anterolisthesis of L4 on L5.  Severe neural foraminal stenosis at this level.  L5 pars defects with  anterolisthesis of  L5 on S1. Severe neural foraminal stenosis at this  level.  No acute bony findings.  No dislocation.    Soft tissues:  Fat-containing left inguinal hernia.    Vasculature:  Unremarkable.  No abdominal aortic aneurysm.    Lymph nodes:  Unremarkable.  No enlarged lymph nodes.       Impression:      1.  Inflammation surrounding the body and tail the pancreas compatible  with acute pancreatitis. Prominence of the pancreatic duct just distal  warrants follow-up imaging to exclude a parenchymal lesion. Consider  follow-up with MRI when patient's clinical status improves.  2.  No pancreatic pseudocyst, necrosis, or hemorrhage identified.  3.  Mild fatty infiltration of liver.  4.  Diverticulosis without diverticulitis.  5.  Left inguinal hernia which contains fat only.  6.  Degenerative changes lower lumbar spine with listhesis and severe  neural foraminal stenosis detailed above.  7.  Other incidental and nonacute findings as above.     This report was finalized on 11/30/2022 1:18 PM by Dr. Juan Ramon Wright MD.           Assessment & Plan    This is an elderly male with history of COPD and GERD who presented to the ED for abdominal pain, found to have acute pancreatitis.      Acute pancreatitis   Incidental Lung Nodule- will need outpatient follow up   Generalized anxiety disorder  COPD, not in exacerbation  Tobacco use disorder   GERD   HTN   JONNIE   CAD               -Holding p.o. home meds, will consider restarting use later if he is able to tolerate clear liquid diet              -Clear liquid diet, IV fluids, IV pain control and prn antiemetics.  Hopeful to advance diet as tolerated              -will need  Follow up imaging radiology recommended pancreas MRI protocol after he recovers from this acute illness         The patient is high risk due to the following diagnoses/reasons: Acute pancreatitis comorbidities as above    VTE Prophylaxis:   Mechanical Order History:     None      Pharmalogical Order History:       Ordered     Dose Route Frequency Stop    11/30/22 1557  heparin (porcine) 5000 UNIT/ML injection 5,000 Units         5,000 Units SC Every 8 Hours Scheduled --                Disposition Likely home in 24 to 48 hours pending stabilization and treatment  Any copied text has been reviewed and updated for accuracy  Anna Rodriguez DO  [facility]  12/01/22  12:53 EST

## 2022-12-01 NOTE — PLAN OF CARE
Goal Outcome Evaluation:              Outcome Evaluation: Patient has rested in bed, PRN medication given for complaints or pain, no other complaints or request at this time, VSS, Will continue plan of care.

## 2022-12-02 NOTE — PROGRESS NOTES
"Subjective   Bryson Baker is a 72 y.o. male.       Chief Complaint -abdominal pain    History of Present Illness -    ROS    Abdominal pain-  Patient complains of severe sharp epigastric abdominal pain that radiates to the back.  Onset 2 weeks.  No known specific injury.  Patient denies any vomiting or diarrhea.    Osteoarthritis-  Patient complains of joint pain that is worse in his upper and lower back.  Pain is described as intermittent moderate throbbing and aching.  Worse with cold weather.    Hyperlipidemia-stable with atorvastatin and low-cholesterol diet    The following portions of the patient's history were reviewed and updated as appropriate: allergies, current medications, past family history, past medical history, past social history, past surgical history and problem list.    Review of Systems    Objective  Vital signs:  /80   Pulse 66   Temp 98.2 °F (36.8 °C)   Ht 180.3 cm (70.98\")   Wt 98 kg (216 lb)   SpO2 94%   BMI 30.14 kg/m²     Physical Exam  Vitals and nursing note reviewed.   Constitutional:       Appearance: Normal appearance. He is well-developed.   Eyes:      Extraocular Movements: Extraocular movements intact.      Conjunctiva/sclera: Conjunctivae normal.   Cardiovascular:      Rate and Rhythm: Normal rate and regular rhythm.      Heart sounds: Normal heart sounds. No murmur heard.  Pulmonary:      Effort: Pulmonary effort is normal. No respiratory distress.      Breath sounds: Normal breath sounds. No wheezing.   Abdominal:      General: Bowel sounds are normal.      Tenderness: There is abdominal tenderness (epigastric). There is no guarding or rebound.   Musculoskeletal:         General: No tenderness.   Skin:     General: Skin is warm and dry.      Findings: No rash.   Neurological:      Mental Status: He is alert and oriented to person, place, and time.   Psychiatric:         Mood and Affect: Mood normal.         Behavior: Behavior normal.         Thought Content: " Thought content normal.         The following data was reviewed by: ESTER Garner on 11/29/2022:  CMP    CMP 10/17/22 11/30/22 12/1/22   Glucose 77 101 (A) 84   BUN 14 15 12   Creatinine 0.99 1.02 0.89   Sodium 139 136 136   Potassium 4.7 4.4 3.8   Chloride 103 101 105   Calcium 9.0 8.8 8.3 (A)   Total Protein 6.5     Albumin 4.1 3.78 3.26 (A)   Globulin 2.4     Total Bilirubin 0.2 0.9 1.0   Alkaline Phosphatase 116 111 96   AST (SGOT) 13 13 10   ALT (SGPT) 17 14 12   (A) Abnormal value            CBC w/diff    CBC w/Diff 10/18/22 11/30/22 12/1/22   WBC 8.80 8.12 6.80   RBC 5.18 5.41 4.69   Hemoglobin 15.6 16.1 14.2   Hematocrit 46.0 48.1 42.3   MCV 88.8 88.9 90.2   MCH 30.1 29.8 30.3   MCHC 33.9 33.5 33.6   RDW 13.6 13.3 13.4   Platelets 326 316 261   Neutrophil Rel % 60.1 53.9    Immature Granulocyte Rel % 0.3 0.5    Lymphocyte Rel % 25.9 31.5    Monocyte Rel % 8.6 9.7    Eosinophil Rel % 4.2 3.7    Basophil Rel % 0.9 0.7            Lipid Panel    Lipid Panel 10/10/22 10/18/22 11/30/22   Total Cholesterol  121 108   Total Cholesterol 116     Triglycerides 95 127 126   HDL Cholesterol 35 (A) 36 (A) 30 (A)   VLDL Cholesterol 18 23 23   LDL Cholesterol  63 62 55   LDL/HDL Ratio  1.66 1.76   (A) Abnormal value       Comments are available for some flowsheets but are not being displayed.           TSH    TSH 10/10/22 10/18/22   TSH 1.930 1.610           Most Recent A1C    HGBA1C Most Recent 10/18/22   Hemoglobin A1C 5.40                  Assessment & Plan     Diagnoses and all orders for this visit:    1. Epigastric pain (Primary)  Comments:  Patient advised to go to the emergency room for further evaluation  Discussed differential diagnosis with patient today and he agrees to go to emergency room    2. Osteoarthritis of spine with radiculopathy, thoracolumbar region  Comments:  Start Celebrex  Advised conservative measures and daily stretching  Orders:  -     Discontinue: celecoxib (CeleBREX) 200 MG capsule;  Take 1 capsule by mouth Daily.  Dispense: 90 capsule; Refill: 3  -     celecoxib (CeleBREX) 200 MG capsule; Take 1 capsule by mouth Daily.  Dispense: 30 capsule; Refill: 5    3. Mixed hyperlipidemia  Comments:  Continue atorvastatin  Advised low-cholesterol diet            Patient was given instructions and counseling regarding his condition or for health maintenance advice. Please see specific information pulled into the AVS if appropriate      This document has been electronically signed by:  Rita Orr PA-C

## 2022-12-02 NOTE — NURSING NOTE
"CNA came into hallway and stated that patient wanted to leave. Upon entering room, patient was getting dressed and stated that he was leaving because he was cold and shaking and \"couldn't stand it anymore\". Patient also asked to have his IV taken out right now. Patient made aware of risks of leaving without medical treatment and benefits of staying and receiving treatment. PA was called to notify of patient wanting to leave. Upon returning to room patient had taken the IV out on their own. Patient stated he had all of his belongings with him.  "

## 2022-12-02 NOTE — PAYOR COMM NOTE
"CONTACT:   Liza Sepulveda RN  Phone: [phone]  Fax: [phone]    DISCHARGE NOTIFICATION    DISCHARGE TO: A    REF # [id number]  DC DATE: 12/1/2022    IF YOU NEED ANYTHING FURTHER PLEASE LET ME KNOW.   THANKS     Bryson Garner (72 y.o. Male)     Date of Birth   1950    Social Security Number       Address   [address] [place] [address]    Home Phone   [phone]    MRN   [id number]       [employer]   [employer]    Marital Status   Single                            Admission Date   11/30/22    Admission Type   Emergency    Admitting Provider   Anna Rodriguez DO    Attending Provider       Department, Room/Bed   [address], 3342/1S       Discharge Date   12/1/2022    Discharge Disposition   Left Against Medical Advice    Discharge Destination                               Attending Provider: (none)   Allergies: No Known Allergies    Isolation: None   Infection: None   Code Status: Prior    Ht: 180.3 cm (71\")   Wt: 97.8 kg (215 lb 9.6 oz)    Admission Cmt: None   Principal Problem: Acute pancreatitis without infection or necrosis, unspecified pancreatitis type [K85.90]                 Active Insurance as of 11/30/2022     Primary Coverage     Payor Plan Insurance Group Employer/Plan Group    [facility] CCN [id number]      Payor Plan Address Payor Plan Phone Number Payor Plan Fax Number Effective Dates    [address] [phone]  1/1/2022 - None Entered    [facility] [id number]       Subscriber Name Subscriber Birth Date Member ID       BRYSON GARNER 1950 [id number]                 Emergency Contacts      (Rel.) Home Phone Work Phone Mobile Phone    OliviaJorge johnson (Relative) [phone] -- --    [place] (Daughter) [phone] -- --            Discharge Summary    No notes of this type exist for this encounter.         "

## 2022-12-02 NOTE — DISCHARGE SUMMARY
[facility] DISCHARGE SUMMARY    Patient Identification:  Name:  Bryson Baker  Age:  72 y.o.  Sex:  male  :  1950  MRN:  [id number]  Visit Number:  [id number]    Date of Admission: 2022  Date of Discharge:  2022     PCP: Rita Orr PA    DISCHARGE DIAGNOSIS  Acute pancreatitis     CONSULTS   None    PROCEDURES PERFORMED  None  Imaging:   CT Abdomen Pelvis With Contrast    Result Date: 2022  EXAM:   CT Abdomen and Pelvis With Intravenous Contrast  EXAM DATE:   2022 12:44 PM  CLINICAL HISTORY:   LUQ pain  TECHNIQUE:   Axial computed tomography images of the abdomen and pelvis with intravenous contrast.  Sagittal and coronal reformatted images were created and reviewed.  This CT exam was performed using one or more of the following dose reduction techniques:  automated exposure control, adjustment of the mA and/or kV according to patient size, and/or use of iterative reconstruction technique.  COMPARISON:   No relevant prior studies available.  FINDINGS:   Lung bases:  Unremarkable.  No mass.  No consolidation.   ABDOMEN:   Liver:  Mild diffuse fatty infiltration of liver.   Gallbladder and bile ducts:  Unremarkable.  No calcified stones.  No ductal dilation.   Pancreas:  Inflammation surrounding the mid body and tail of the pancreas compatible with acute pancreatitis.  No pancreatic hemorrhage or pseudocyst identified.  No ductal dilation.   Spleen:  Unremarkable.  No splenomegaly.   Adrenals:  Unremarkable.  No mass.   Kidneys and ureters:  No renal or ureteral stones. No hydronephrosis identified.   Stomach and bowel:  Sigmoid diverticulosis without evidence of acute diverticulitis.  No bowel obstruction.   PELVIS:   Appendix:  Normal appendix.   Bladder:  Unremarkable.  No mass.   Reproductive:  Prostate enlargement.   ABDOMEN and PELVIS:   Intraperitoneal space:  No pneumoperitoneum.  No significant fluid collection.   Bones/joints:  L4 pars  defects with anterolisthesis of L4 on L5. Severe neural foraminal stenosis at this level.  L5 pars defects with anterolisthesis of L5 on S1. Severe neural foraminal stenosis at this level.  No acute bony findings.  No dislocation.   Soft tissues:  Fat-containing left inguinal hernia.   Vasculature:  Unremarkable.  No abdominal aortic aneurysm.   Lymph nodes:  Unremarkable.  No enlarged lymph nodes.      1.  Inflammation surrounding the body and tail the pancreas compatible with acute pancreatitis. Prominence of the pancreatic duct just distal warrants follow-up imaging to exclude a parenchymal lesion. Consider follow-up with MRI when patient's clinical status improves. 2.  No pancreatic pseudocyst, necrosis, or hemorrhage identified. 3.  Mild fatty infiltration of liver. 4.  Diverticulosis without diverticulitis. 5.  Left inguinal hernia which contains fat only. 6.  Degenerative changes lower lumbar spine with listhesis and severe neural foraminal stenosis detailed above. 7.  Other incidental and nonacute findings as above.  This report was finalized on 11/30/2022 1:18 PM by Dr. Juan Ramon Wright MD.      XR Chest 1 View    Result Date: 11/30/2022  EXAM:   XR Chest, 1 View  EXAM DATE:   11/30/2022 11:28 AM  CLINICAL HISTORY:   upper abd pain  TECHNIQUE:   Frontal view of the chest.  COMPARISON:   08/30/2018  FINDINGS:   Lungs:  Unremarkable.  No consolidation.   Pleural space:  Unremarkable.  No pneumothorax.   Heart:  Unremarkable.  No cardiomegaly.   Mediastinum:  Unremarkable.   Bones/joints:  Unremarkable.        Unremarkable exam. No acute cardiopulmonary findings identified.  This report was finalized on 11/30/2022 11:52 AM by Dr. Juan Ramon Wright MD.      CT Angiogram Chest Pulmonary Embolism    Result Date: 11/30/2022  EXAM:   CT Angiography Chest With Intravenous Contrast  EXAM DATE:   11/30/2022 12:44 PM  CLINICAL HISTORY:   left lower chest wall pain  TECHNIQUE:   Axial computed tomographic angiography images  of the chest with intravenous contrast.  This CT exam was performed using one or more of the following dose reduction techniques:  automated exposure control, adjustment of the mA and/or kV according to patient size, and/or use of iterative reconstruction technique.   MIP reconstructed images were created and reviewed.  COMPARISON:   08/29/2022  FINDINGS:   Pulmonary arteries:  No evidence of PE.   Aorta:  Mild atherosclerosis aorta.  No thoracic aortic aneurysm.   Lungs:  Central bronchial wall thickening.  Mild interstitial thickening likely chronic interstitial lung changes.  No mass. Stable small bilateral pulmonary nodules.   Pleural space:  No pleural effusions.  No pneumothorax.   Heart:  Mild cardiomegaly.  Mild coronary artery calcifications.  No significant pericardial effusion.  No evidence of RV dysfunction.   Mediastinum:  No mediastinal or hilar lymphadenopathy.   Bones/joints:  No acute fracture.  No dislocation.   Soft tissues:  Unremarkable.   Lymph nodes:  See above.   Pancreas:  Inflammation of the pancreas compatible with acute pancreatitis.   Other findings:  Scattered calcified granulomas.      1.  No PE. 2.  Cardiomegaly and chronic appearing interstitial lung changes. 3.  Acute pancreatitis. 4. Stable small bilateral pulmonary nodules. Otherwise no acute thoracic findings noted.  This report was finalized on 11/30/2022 1:20 PM by Dr. Juan Ramon Wright MD.      US Abdomen Limited    Result Date: 11/30/2022  EXAM:   US Abdomen Limited, Right Upper Quadrant  EXAM DATE:   11/30/2022 11:12 AM  CLINICAL HISTORY:   upper abd pain with eating  TECHNIQUE:   Real-time ultrasound of the right upper quadrant with image documentation.  COMPARISON:   No relevant prior studies available.  FINDINGS:   Liver:  Unremarkable.  No mass.  No intrahepatic bile duct dilation.   Gallbladder:  Unremarkable.  No gallstones.   Common bile duct:  Common bile duct is 3 mm and is within normal limits.  No stones.  No  dilation.   Pancreas:  Unremarkable as visualized.   Right kidney: Unremarkable.  No stones.  No solid mass.  No hydronephrosis.        Essentially unremarkable exam with no acute findings sonographically evident.  This report was finalized on 11/30/2022 11:53 AM by Dr. Juan Ramon Wright MD.        HOSPITAL COURSE  Patient is a 72 y.o. male presented to [facility] complaining of abdominal pain.  He reports a history of pancreatitis several years ago which required hospitalization, does not recall the reason for that.  He denies any alcohol use.  Upon admission he had been having this abdominal pain for several days, but it got progressively worse prompting ED visit. He described his pain as worsened when he is eating or trying to take his medications.  He has had associated nausea but no vomiting.  He reportedly had EGD and colonoscopy recently with Dr. Desouza, and was treated on antibiotics for H. pylori.  In the ED, he was noted to have epigastric tenderness as well as a mildly elevated lipase and evidence for inflammation and acute pancreatitis on CT imaging.  Hospitalist service is asked to admit for pancreatitis. He was made NPO and given IV fluids, antiemetics, and analgesia. His diet was advanced to clear liquids. He made the decision to leave against medical advice. Prior to his discharge, imaging findings and the need for outpatient follow up were discussed with him.          Please see the admitting history and physical for further details.          VITAL SIGNS:  Temp:  [97.9 °F (36.6 °C)] 97.9 °F (36.6 °C)  Heart Rate:  [68-76] 76  Resp:  [18-20] 18  BP: (168)/(88) 168/88  SpO2:  [96 %] 96 %  on   ;   Device (Oxygen Therapy): room air    Body mass index is 30.07 kg/m².  Wt Readings from Last 3 Encounters:   11/30/22 97.8 kg (215 lb 9.6 oz)   11/29/22 98 kg (216 lb)   10/17/22 100 kg (221 lb)       PHYSICAL EXAM:  Constitutional:  Resting in bed NAD   HENT:  Head: Normocephalic and atraumatic.   Mouth:  Dry mucous membranes.    Eyes:  Conjunctivae and EOM are normal.  Pupils are equal, round, and reactive to light.  No scleral icterus.  Neck:  Neck supple.  No JVD present.    Cardiovascular:  Normal rate, regular rhythm and normal heart sounds with no murmur.  Pulmonary/Chest:  No respiratory distress, no wheezes, no crackles, with normal breath sounds and good air movement.  Abdominal:  Soft. Tender in epigastrium and LUQ, midline hernia is noted and reducible.  Bowel sounds are normal.  No distension  Musculoskeletal:  No edema, no tenderness, and no deformity.  No red or swollen joints anywhere.    Neurological:  Alert and oriented to person, place, and time.  No cranial nerve deficit.  No tongue deviation.  No facial droop.  No slurred speech.   Skin:  Skin is warm and dry.  No rash noted.  No pallor.   Psychiatric:  Normal mood and affect.  Behavior is normal.  Judgment and thought content normal.   Peripheral vascular:  No edema and strong pulses on all 4 extremities.    DISCHARGE DISPOSITION   Against medical advice     DISCHARGE MEDICATIONS:     Discharge Medications      ASK your doctor about these medications      Instructions Start Date   albuterol sulfate  (90 Base) MCG/ACT inhaler  Commonly known as: PROVENTIL HFA;VENTOLIN HFA;PROAIR HFA   2 puffs, Inhalation, Every 4 Hours PRN      aspirin 81 MG EC tablet  Ask about: Which instructions should I use?   81 mg, Oral, 2 Times Daily      atorvastatin 40 MG tablet  Commonly known as: LIPITOR   20 mg, Oral, Daily      celecoxib 200 MG capsule  Commonly known as: CeleBREX   200 mg, Oral, Daily      Fluticasone-Salmeterol 250-50 MCG/ACT DISKUS  Commonly known as: ADVAIR/WIXELA   1 puff, Inhalation, 2 Times Daily - RT      gabapentin 400 MG capsule  Commonly known as: NEURONTIN   400 mg, Oral, 3 Times Daily      HYDROcodone-acetaminophen  MG per tablet  Commonly known as: NORCO   1 tablet, Oral, 3 Times Daily      losartan 25 MG  tablet  Commonly known as: Cozaar   25 mg, Oral, Daily      pantoprazole 40 MG EC tablet  Commonly known as: PROTONIX   40 mg, Oral, 2 Times Daily      ranolazine 500 MG 12 hr tablet  Commonly known as: Ranexa   500 mg, Oral, 2 Times Daily      tiotropium bromide monohydrate 2.5 MCG/ACT aerosol solution inhaler  Commonly known as: SPIRIVA RESPIMAT   1 puff, Inhalation, Daily - RT                    TEST  RESULTS PENDING AT DISCHARGE       The ASCVD Risk score ([place] DK, et al., 2019) failed to calculate for the following reasons:    The valid total cholesterol range is 130 to 320 mg/dL     CODE STATUS  Code Status and Medical Interventions:   Ordered at: 11/30/22 1447     Level Of Support Discussed With:    Patient     Code Status (Patient has no pulse and is not breathing):    CPR (Attempt to Resuscitate)     Medical Interventions (Patient has pulse or is breathing):    Full Support       Anna Rodriguez DO  [facility]ist  12/02/22  17:03 EST    Please note that this discharge summary required more than 30 minutes to complete.

## 2022-12-08 ENCOUNTER — OFFICE VISIT (OUTPATIENT)
Dept: FAMILY MEDICINE CLINIC | Facility: CLINIC | Age: 72
End: 2022-12-08

## 2022-12-08 VITALS
DIASTOLIC BLOOD PRESSURE: 56 MMHG | TEMPERATURE: 98.3 F | WEIGHT: 211 LBS | SYSTOLIC BLOOD PRESSURE: 112 MMHG | HEIGHT: 71 IN | BODY MASS INDEX: 29.54 KG/M2 | OXYGEN SATURATION: 96 % | HEART RATE: 77 BPM

## 2022-12-08 DIAGNOSIS — I10 ESSENTIAL HYPERTENSION: Chronic | ICD-10-CM

## 2022-12-08 DIAGNOSIS — E78.2 MIXED HYPERLIPIDEMIA: Chronic | ICD-10-CM

## 2022-12-08 DIAGNOSIS — K76.0 FATTY INFILTRATION OF LIVER: Chronic | ICD-10-CM

## 2022-12-08 DIAGNOSIS — R93.5 ABNORMAL CT OF THE ABDOMEN: ICD-10-CM

## 2022-12-08 DIAGNOSIS — K85.00 IDIOPATHIC ACUTE PANCREATITIS, UNSPECIFIED COMPLICATION STATUS: Primary | ICD-10-CM

## 2022-12-08 DIAGNOSIS — K40.90 LEFT INGUINAL HERNIA: ICD-10-CM

## 2022-12-08 DIAGNOSIS — M51.36 LUMBAR DEGENERATIVE DISC DISEASE: Chronic | ICD-10-CM

## 2022-12-08 PROCEDURE — 99214 OFFICE O/P EST MOD 30 MIN: CPT | Performed by: PHYSICIAN ASSISTANT

## 2022-12-11 PROBLEM — K76.0 FATTY INFILTRATION OF LIVER: Chronic | Status: ACTIVE | Noted: 2022-12-11

## 2022-12-11 PROBLEM — R93.5 ABNORMAL CT OF THE ABDOMEN: Status: ACTIVE | Noted: 2022-12-11

## 2022-12-11 PROBLEM — M51.369 LUMBAR DEGENERATIVE DISC DISEASE: Chronic | Status: ACTIVE | Noted: 2022-12-11

## 2022-12-11 PROBLEM — M51.36 LUMBAR DEGENERATIVE DISC DISEASE: Chronic | Status: ACTIVE | Noted: 2022-12-11

## 2022-12-11 PROBLEM — K40.90 LEFT INGUINAL HERNIA: Status: ACTIVE | Noted: 2022-12-11

## 2022-12-11 NOTE — PROGRESS NOTES
"Subjective   Bryson Baker is a 72 y.o. male.       Chief Complaint -pancreatitis    History of Present Illness -    ROS    Pancreatitis-  Patient complained of abdominal pain and went to [facility] ER on 11/30/2022 where he was admitted and found to have acute pancreatitis.  Patient symptoms improved with treatment.  Patient states he has some mild abdominal pain but declines going back into the hospital.    11/30/2022 CT abdomen and pelvis with contrast revealed inflammation surrounding the body and tail of pancreas compatible with acute pancreatitis.  Prominence of the pancreatic duct just distal warrants follow-up imaging to exclude parenchymal lesion.  Mild fatty liver.  Diverticulosis without diverticulitis.  Left inguinal hernia which contains fat only.  Degenerative changes of lower lumbar spine with listhesis and severe neuroforaminal stenosis.    Current outpatient and discharge medications have been reconciled for the patient.  Reviewed by: ESTER Garner    Hypertension-controlled with losartan    Hyperlipidemia-stable with atorvastatin and low-cholesterol diet    The following portions of the patient's history were reviewed and updated as appropriate: allergies, current medications, past family history, past medical history, past social history, past surgical history and problem list.    Review of Systems    Objective  Vital signs:  /56   Pulse 77   Temp 98.3 °F (36.8 °C) (Temporal)   Ht 180.3 cm (71\")   Wt 95.7 kg (211 lb)   SpO2 96%   BMI 29.43 kg/m²     Physical Exam  Vitals and nursing note reviewed.   Constitutional:       Appearance: Normal appearance. He is well-developed.   Eyes:      Extraocular Movements: Extraocular movements intact.      Conjunctiva/sclera: Conjunctivae normal.   Cardiovascular:      Rate and Rhythm: Normal rate and regular rhythm.      Heart sounds: Normal heart sounds. No murmur heard.  Pulmonary:      Effort: Pulmonary effort is normal. No " respiratory distress.      Breath sounds: Normal breath sounds. No wheezing.   Abdominal:      General: Bowel sounds are normal.      Palpations: Abdomen is soft. There is no mass.      Tenderness: There is no abdominal tenderness.   Musculoskeletal:         General: No tenderness.   Skin:     General: Skin is warm and dry.      Findings: No rash.   Neurological:      Mental Status: He is alert and oriented to person, place, and time.   Psychiatric:         Mood and Affect: Mood normal.         Behavior: Behavior normal.         Thought Content: Thought content normal.         The following data was reviewed by: ESTER Garner on 12/08/2022:  CMP    CMP 10/17/22 11/30/22 12/1/22   Glucose 77 101 (A) 84   BUN 14 15 12   Creatinine 0.99 1.02 0.89   Sodium 139 136 136   Potassium 4.7 4.4 3.8   Chloride 103 101 105   Calcium 9.0 8.8 8.3 (A)   Total Protein 6.5     Albumin 4.1 3.78 3.26 (A)   Globulin 2.4     Total Bilirubin 0.2 0.9 1.0   Alkaline Phosphatase 116 111 96   AST (SGOT) 13 13 10   ALT (SGPT) 17 14 12   (A) Abnormal value            CBC w/diff    CBC w/Diff 10/18/22 11/30/22 12/1/22   WBC 8.80 8.12 6.80   RBC 5.18 5.41 4.69   Hemoglobin 15.6 16.1 14.2   Hematocrit 46.0 48.1 42.3   MCV 88.8 88.9 90.2   MCH 30.1 29.8 30.3   MCHC 33.9 33.5 33.6   RDW 13.6 13.3 13.4   Platelets 326 316 261   Neutrophil Rel % 60.1 53.9    Immature Granulocyte Rel % 0.3 0.5    Lymphocyte Rel % 25.9 31.5    Monocyte Rel % 8.6 9.7    Eosinophil Rel % 4.2 3.7    Basophil Rel % 0.9 0.7            Lipid Panel    Lipid Panel 10/10/22 10/18/22 11/30/22   Total Cholesterol  121 108   Total Cholesterol 116     Triglycerides 95 127 126   HDL Cholesterol 35 (A) 36 (A) 30 (A)   VLDL Cholesterol 18 23 23   LDL Cholesterol  63 62 55   LDL/HDL Ratio  1.66 1.76   (A) Abnormal value       Comments are available for some flowsheets but are not being displayed.           TSH    TSH 10/10/22 10/18/22   TSH 1.930 1.610           Most Recent A1C     HGBA1C Most Recent 10/18/22   Hemoglobin A1C 5.40                  Assessment & Plan     Diagnoses and all orders for this visit:    1. Idiopathic acute pancreatitis, unspecified complication status (Primary)  Comments:  Advised soft diet and patient declines hospitalization    2. Abnormal CT of the abdomen  -     MRI abdomen w wo contrast; Future    3. Left inguinal hernia  Comments:  Since asymptomatic and found incidentally on 11/30/2022 CT scan discussed watching and waiting for any symptoms    4. Lumbar degenerative disc disease  Comments:  11/30/2022 CT abdomen and pelvis discussed with patient  Symptomatic care advised    5. Fatty infiltration of liver  Comments:  Seen by CT scan 11/30/2022 and discussed with patient today  Advised avoidance of Tylenol and alcohol    6. Essential hypertension  Comments:  Continue losartan  Continue to monitor BP daily and report consistent readings greater 130/80    7. Mixed hyperlipidemia  Comments:  Advised low-cholesterol diet  Continue atorvastatin            Patient was given instructions and counseling regarding his condition or for health maintenance advice. Please see specific information pulled into the AVS if appropriate      This document has been electronically signed by:  Rita Orr PA-C

## 2022-12-20 ENCOUNTER — HOSPITAL ENCOUNTER (OUTPATIENT)
Dept: CT IMAGING | Facility: HOSPITAL | Age: 72
Discharge: HOME | End: 2022-12-20
Admitting: NURSE PRACTITIONER

## 2022-12-20 DIAGNOSIS — R91.8 MULTIPLE PULMONARY NODULES: ICD-10-CM

## 2022-12-20 PROCEDURE — 71250 CT THORAX DX C-: CPT

## 2022-12-20 PROCEDURE — 71250 CT THORAX DX C-: CPT | Performed by: RADIOLOGY

## 2023-01-04 ENCOUNTER — APPOINTMENT (OUTPATIENT)
Dept: MRI IMAGING | Facility: HOSPITAL | Age: 73
End: 2023-01-04
Payer: OTHER GOVERNMENT

## 2023-01-04 ENCOUNTER — HOSPITAL ENCOUNTER (OUTPATIENT)
Dept: MRI IMAGING | Facility: HOSPITAL | Age: 73
Discharge: HOME | End: 2023-01-04
Admitting: PHYSICIAN ASSISTANT
Payer: OTHER GOVERNMENT

## 2023-01-04 DIAGNOSIS — R93.5 ABNORMAL CT OF THE ABDOMEN: ICD-10-CM

## 2023-01-04 LAB — CREAT BLDA-MCNC: 1.1 MG/DL (ref 0.6–1.3)

## 2023-01-04 PROCEDURE — A9577 INJ MULTIHANCE: HCPCS | Performed by: PHYSICIAN ASSISTANT

## 2023-01-04 PROCEDURE — 82565 ASSAY OF CREATININE: CPT

## 2023-01-04 PROCEDURE — 0 GADOBENATE DIMEGLUMINE 529 MG/ML SOLUTION: Performed by: PHYSICIAN ASSISTANT

## 2023-01-04 PROCEDURE — 74183 MRI ABD W/O CNTR FLWD CNTR: CPT

## 2023-01-04 PROCEDURE — 74183 MRI ABD W/O CNTR FLWD CNTR: CPT | Performed by: RADIOLOGY

## 2023-01-04 RX ADMIN — GADOBENATE DIMEGLUMINE 19 ML: 529 INJECTION, SOLUTION INTRAVENOUS at 09:47

## 2023-01-06 DIAGNOSIS — J44.9 COPD MIXED TYPE: Primary | ICD-10-CM

## 2023-01-06 NOTE — PROGRESS NOTES
Notified the patient of CT chest results.  Will discuss with Dr. Delgado on when we will repeat CT chest.  Ordered an overnight pulse oximetry study to assess oxygen saturation during sleep.

## 2023-01-10 ENCOUNTER — OFFICE VISIT (OUTPATIENT)
Dept: FAMILY MEDICINE CLINIC | Facility: CLINIC | Age: 73
End: 2023-01-10
Payer: OTHER GOVERNMENT

## 2023-01-10 VITALS
HEART RATE: 83 BPM | WEIGHT: 207 LBS | HEIGHT: 71 IN | BODY MASS INDEX: 28.98 KG/M2 | DIASTOLIC BLOOD PRESSURE: 74 MMHG | TEMPERATURE: 96.8 F | SYSTOLIC BLOOD PRESSURE: 134 MMHG | OXYGEN SATURATION: 97 %

## 2023-01-10 DIAGNOSIS — M47.25 OSTEOARTHRITIS OF SPINE WITH RADICULOPATHY, THORACOLUMBAR REGION: Chronic | ICD-10-CM

## 2023-01-10 DIAGNOSIS — M51.36 DEGENERATIVE DISC DISEASE, LUMBAR: Chronic | ICD-10-CM

## 2023-01-10 DIAGNOSIS — K40.90 LEFT INGUINAL HERNIA: Chronic | ICD-10-CM

## 2023-01-10 DIAGNOSIS — Q45.3 PANCREATIC DUCTAL ABNORMALITY: Primary | Chronic | ICD-10-CM

## 2023-01-10 DIAGNOSIS — J44.9 COPD MIXED TYPE: ICD-10-CM

## 2023-01-10 DIAGNOSIS — K76.0 FATTY LIVER: Chronic | ICD-10-CM

## 2023-01-10 PROCEDURE — 99214 OFFICE O/P EST MOD 30 MIN: CPT | Performed by: PHYSICIAN ASSISTANT

## 2023-01-10 RX ORDER — CELECOXIB 200 MG/1
200 CAPSULE ORAL DAILY
Qty: 90 CAPSULE | Refills: 3 | Status: SHIPPED | OUTPATIENT
Start: 2023-01-10

## 2023-01-10 RX ORDER — ALBUTEROL SULFATE 90 UG/1
2 AEROSOL, METERED RESPIRATORY (INHALATION) EVERY 4 HOURS PRN
Qty: 18 G | Refills: 5 | Status: SHIPPED | OUTPATIENT
Start: 2023-01-10

## 2023-01-10 RX ORDER — CELECOXIB 200 MG/1
200 CAPSULE ORAL DAILY
Qty: 30 CAPSULE | Refills: 5 | Status: SHIPPED | OUTPATIENT
Start: 2023-01-10 | End: 2023-01-10 | Stop reason: SDUPTHER

## 2023-01-10 NOTE — PROGRESS NOTES
Subjective   Bryson Baker is a 72 y.o. male.       Chief Complaint -imaging results    History of Present Illness -    ROS    His daughter is here with him today.    Imaging results-  Patient is here today to get imaging results from MRI of the abdomen.  Patient was recently diagnosed and treated for pancreatitis.  On 11/30/2022 CT of the abdomen revealed inflammation surrounding the body and tail of the pancreas compatible with acute pancreatitis.  Prominence of the pancreatic duct just distal warrants follow-up imaging to exclude a parenchymal lesion.  Mild fatty liver.  Left inguinal hernia which contains only fat.  Degenerative lumbar spine changes.  Further follow-up is warranted with MRI.  Patient denies any current abdominal pain or symptoms of pancreatitis.  Patient states that he does not drink alcohol with any regularity.    1/4/2023 MRI of the abdomen with and without contrast revealed:  1.  No evidence of pancreatic mass  2.  Prominence of distal aspect of pancreatic duct.    Emphysema-  Stable with albuterol HFA as needed    Osteoarthritis-stable with Celebrex as needed and activity as tolerated    Left inguinal hernia-  Found incidentally on MRI.  Patient currently asymptomatic.    Degenerative disc disease of lumbar spine-  Stable with [facility] [place] [facility] and [facility]abapentin.    The following portions of the patient's history were reviewed and updated as appropriate: allergies, current medications, past family history, past medical history, past social history, past surgical history and problem list.    Review of Systems    Objective  Vital signs:  /74   Pulse 83   Temp 96.8 °F (36 °C) (Temporal)   Ht 180.3 cm (71\")   Wt 93.9 kg (207 lb)   SpO2 97%   BMI 28.87 kg/m²     Physical Exam  Vitals and nursing note reviewed.   Constitutional:       Appearance: Normal appearance. He is well-developed.   Eyes:      Extraocular Movements: Extraocular  movements intact.      Conjunctiva/sclera: Conjunctivae normal.   Cardiovascular:      Rate and Rhythm: Normal rate and regular rhythm.      Heart sounds: Normal heart sounds. No murmur heard.  Pulmonary:      Effort: Pulmonary effort is normal. No respiratory distress.      Breath sounds: Normal breath sounds. No wheezing.   Musculoskeletal:         General: No tenderness.   Skin:     General: Skin is warm and dry.      Findings: No rash.   Neurological:      Mental Status: He is alert and oriented to person, place, and time.   Psychiatric:         Mood and Affect: Mood normal.         Behavior: Behavior normal.         Thought Content: Thought content normal.         The following data was reviewed by: ESTER Garner on 01/10/2023:  CMP    CMP 11/30/22 12/1/22 1/4/23   Glucose 101 (A) 84    BUN 15 12    Creatinine 1.02 0.89 1.10   eGFR 78.6 91.6    Sodium 136 136    Potassium 4.4 3.8    Chloride 101 105    Calcium 8.8 8.3 (A)    Total Protein 6.6 5.7 (A)    Albumin 3.78 3.26 (A)    Globulin 2.8 2.4    Total Bilirubin 0.9 1.0    Alkaline Phosphatase 111 96    AST (SGOT) 13 10    ALT (SGPT) 14 12    Albumin/Globulin Ratio 1.3 1.3    BUN/Creatinine Ratio 14.7 13.5    Anion Gap 10.9 9.8    (A) Abnormal value       Comments are available for some flowsheets but are not being displayed.           CBC w/diff    CBC w/Diff 10/18/22 11/30/22 12/1/22   WBC 8.80 8.12 6.80   RBC 5.18 5.41 4.69   Hemoglobin 15.6 16.1 14.2   Hematocrit 46.0 48.1 42.3   MCV 88.8 88.9 90.2   MCH 30.1 29.8 30.3   MCHC 33.9 33.5 33.6   RDW 13.6 13.3 13.4   Platelets 326 316 261   Neutrophil Rel % 60.1 53.9    Immature Granulocyte Rel % 0.3 0.5    Lymphocyte Rel % 25.9 31.5    Monocyte Rel % 8.6 9.7    Eosinophil Rel % 4.2 3.7    Basophil Rel % 0.9 0.7            Lipid Panel    Lipid Panel 10/10/22 10/18/22 11/30/22   Total Cholesterol  121 108   Total Cholesterol 116     Triglycerides 95 127 126   HDL Cholesterol 35 (A) 36 (A) 30 (A)   VLDL  Cholesterol 18 23 23   LDL Cholesterol  63 62 55   LDL/HDL Ratio  1.66 1.76   (A) Abnormal value       Comments are available for some flowsheets but are not being displayed.           TSH    TSH 10/10/22 10/18/22   TSH 1.930 1.610           Most Recent A1C    HGBA1C Most Recent 10/18/22   Hemoglobin A1C 5.40           Data reviewed: Radiologic studies CT scan and MRI abdomen reviewed       Assessment & Plan     Diagnoses and all orders for this visit:    1. Pancreatic ductal abnormality (Primary)  Comments:  Discussed MRI results with the patient since he is asymptomatic the plan is to watch at this time    2. Emphysema  -     albuterol sulfate  (90 Base) MCG/ACT inhaler; Inhale 2 puffs Every 4 (Four) Hours As Needed for Wheezing.  Dispense: 18 g; Refill: 5    3. Osteoarthritis of spine with radiculopathy, thoracolumbar region  Comments:  Continue Celebrex  Advised conservative measures and daily stretching  Orders:  -     Discontinue: celecoxib (CeleBREX) 200 MG capsule; Take 1 capsule by mouth Daily.  Dispense: 30 capsule; Refill: 5  -     celecoxib (CeleBREX) 200 MG capsule; Take 1 capsule by mouth Daily.  Dispense: 90 capsule; Refill: 3    4. Left inguinal hernia  Comments:  Continue to monitor and advised patient of signs and symptoms to report or when appropriate to go to ER    5. Fatty liver  Comments:  Advised to avoid Tylenol and alcohol containing products  Dietary modification advised to encourage weight loss    6. Degenerative disc disease, lumbar  Comments:  Advised activity as tolerated  [facility] symptomatic care [facility] Norco [facility] [place]            Patient was given instructions and counseling regarding his condition or for health maintenance advice. Please see specific information pulled into the AVS if appropriate      This document has been electronically signed by:  Rita Orr PA-C

## 2023-01-17 DIAGNOSIS — R91.8 MULTIPLE PULMONARY NODULES: Primary | ICD-10-CM

## 2023-01-17 NOTE — PROGRESS NOTES
Dr. Delgado reviewed CT imaging.  He recommended a CT chest in 6 months.   Notified the patient of plan of care.

## 2023-01-23 ENCOUNTER — OFFICE VISIT (OUTPATIENT)
Dept: SURGERY | Facility: CLINIC | Age: 73
End: 2023-01-23
Payer: OTHER GOVERNMENT

## 2023-01-23 VITALS — WEIGHT: 207 LBS | BODY MASS INDEX: 28.98 KG/M2 | HEIGHT: 71 IN

## 2023-01-23 DIAGNOSIS — K40.90 LEFT INGUINAL HERNIA: ICD-10-CM

## 2023-01-23 DIAGNOSIS — K42.9 UMBILICAL HERNIA WITHOUT OBSTRUCTION AND WITHOUT GANGRENE: ICD-10-CM

## 2023-01-23 DIAGNOSIS — K85.90 ACUTE PANCREATITIS WITHOUT INFECTION OR NECROSIS, UNSPECIFIED PANCREATITIS TYPE: ICD-10-CM

## 2023-01-23 DIAGNOSIS — K21.9 GASTROESOPHAGEAL REFLUX DISEASE WITHOUT ESOPHAGITIS: Primary | ICD-10-CM

## 2023-01-23 DIAGNOSIS — Z72.0 TOBACCO ABUSE: ICD-10-CM

## 2023-01-23 DIAGNOSIS — E66.09 CLASS 1 OBESITY DUE TO EXCESS CALORIES WITH SERIOUS COMORBIDITY AND BODY MASS INDEX (BMI) OF 30.0 TO 30.9 IN ADULT: ICD-10-CM

## 2023-01-23 DIAGNOSIS — M62.08 DIASTASIS RECTI: ICD-10-CM

## 2023-01-23 DIAGNOSIS — K76.0 FATTY LIVER: ICD-10-CM

## 2023-01-23 PROCEDURE — 99214 OFFICE O/P EST MOD 30 MIN: CPT | Performed by: SURGERY

## 2023-01-23 RX ORDER — LEVOFLOXACIN 500 MG/1
500 TABLET, FILM COATED ORAL DAILY
Qty: 10 TABLET | Refills: 0 | Status: SHIPPED | OUTPATIENT
Start: 2023-01-23 | End: 2023-02-02

## 2023-01-23 RX ORDER — METRONIDAZOLE 500 MG/1
500 TABLET ORAL 2 TIMES DAILY
Qty: 20 TABLET | Refills: 0 | Status: SHIPPED | OUTPATIENT
Start: 2023-01-23 | End: 2023-02-02

## 2023-01-23 NOTE — PROGRESS NOTES
Subjective   Bryson Baker is a 72 y.o. male.     Chief Complaint: umbilical hernia, reflux    History of Present Illness He is a obese 71 yo with diastasis in the epigastrium and a small asymptomatic umbilical hernia. He has reflux had H pylori treated 6 moths ago. He was recently in the hospital for pancreatitis in the tail of the pancreas that he has had in the past as well. He did not have and tumor on the MRI but did had a enlarged duct in the tail. He has a umbilical hernia and left inguinal hernia and on his colonoscopy 6 months ago has some diverticuli. He recently has also had LLQ pains. He is on hydrocodone for his back and frequently takes it for his abdominal pain as well. He smokes and has gained weight in the last few years that has made his back and abdominal pain worse.     The following portions of the patient's history were reviewed and updated as appropriate: current medications, past family history, past medical history, past social history, past surgical history and problem list.    Review of Systems    Objective   Physical Exam large abdomen with very little muscle tone. Diastasis in the epigastrium and small soft umbilical hernia. Inguinal is difficult to feel due to his obesity.     Past Medical History:   Diagnosis Date   • Anxiety    • Arthritis    • Asthma    • Baritosis (HCC)    • COPD (chronic obstructive pulmonary disease) (HCC)    • Coronary artery disease    • Gastritis    • GERD (gastroesophageal reflux disease)    • High cholesterol    • Hypertension    • Sleep apnea    • Sleep apnea        Family History   Problem Relation Age of Onset   • Cancer Mother    • Heart disease Mother    • Cancer Father    • Cancer Sister    • Cancer Brother        Social History     Tobacco Use   • Smoking status: Every Day     Packs/day: 1.00     Types: Cigarettes   • Smokeless tobacco: Never   • Tobacco comments:     had quit but started back today 10/23/18   Vaping Use   • Vaping Use: Never used    Substance Use Topics   • Alcohol use: No   • Drug use: No       Past Surgical History:   Procedure Laterality Date   • CARDIAC CATHETERIZATION     • COLONOSCOPY N/A 7/8/2022    Procedure: COLONOSCOPY;  Surgeon: Tico Rmairez MD;  Location:  [facility];  Service: Gastroenterology;  Laterality: N/A;   • COLONOSCOPY W/ POLYPECTOMY     • ENDOSCOPY N/A 7/8/2022    Procedure: ESOPHAGOGASTRODUODENOSCOPY;  Surgeon: Tico Ramirez MD;  Location: [facility];  Service: Gastroenterology;  Laterality: N/A;   • FINGER SURGERY         Current Outpatient Medications   Medication Instructions   • albuterol sulfate  (90 Base) MCG/ACT inhaler 2 puffs, Inhalation, Every 4 Hours PRN   • aspirin 81 mg, Oral, 2 Times Daily   • atorvastatin (LIPITOR) 20 mg, Oral, Daily   • celecoxib (CELEBREX) 200 mg, Oral, Daily   • Fluticasone-Salmeterol (ADVAIR/WIXELA) 250-50 MCG/ACT DISKUS 1 puff, Inhalation, 2 Times Daily - RT   • gabapentin (NEURONTIN) 400 mg, Oral, 3 Times Daily   • HYDROcodone-acetaminophen (NORCO)  MG per tablet 1 tablet, Oral, 3 Times Daily   • losartan (COZAAR) 25 mg, Oral, Daily   • pantoprazole (PROTONIX) 40 mg, Oral, 2 Times Daily   • ranolazine (RANEXA) 500 mg, Oral, 2 Times Daily   • tiotropium bromide monohydrate (SPIRIVA RESPIMAT) 2.5 MCG/ACT aerosol solution inhaler 1 puff, Inhalation, Daily - RT         Assessment & Plan   Diagnoses and all orders for this visit:    1. Gastroesophageal reflux disease without esophagitis (Primary)    2. Umbilical hernia without obstruction and without gangrene    3. Acute pancreatitis without infection or necrosis, unspecified pancreatitis type    4. Fatty liver    5. Left inguinal hernia    6. Diastasis recti    7. Tobacco abuse    8. Class 1 obesity due to excess calories with serious comorbidity and body mass index (BMI) of 30.0 to 30.9 in adult    His LLQ pain could be from diverticulitis so he will be placed on antibiotics.   Also I discussed the importance of him  stopping smoking and losing weight as well as trying to avoid narcotics as this will make any bowel problems worse.   He will be rechecked in a  Month.

## 2023-02-20 ENCOUNTER — OFFICE VISIT (OUTPATIENT)
Dept: SURGERY | Facility: CLINIC | Age: 73
End: 2023-02-20
Payer: OTHER GOVERNMENT

## 2023-02-20 VITALS — WEIGHT: 207 LBS | HEIGHT: 71 IN | BODY MASS INDEX: 28.98 KG/M2

## 2023-02-20 DIAGNOSIS — K57.92 DIVERTICULITIS: ICD-10-CM

## 2023-02-20 DIAGNOSIS — K42.9 UMBILICAL HERNIA WITHOUT OBSTRUCTION AND WITHOUT GANGRENE: ICD-10-CM

## 2023-02-20 DIAGNOSIS — M62.08 DIASTASIS RECTI: Primary | ICD-10-CM

## 2023-02-20 DIAGNOSIS — K40.90 LEFT INGUINAL HERNIA: ICD-10-CM

## 2023-02-20 DIAGNOSIS — E66.09 CLASS 1 OBESITY DUE TO EXCESS CALORIES WITH SERIOUS COMORBIDITY AND BODY MASS INDEX (BMI) OF 30.0 TO 30.9 IN ADULT: ICD-10-CM

## 2023-02-20 DIAGNOSIS — K76.0 FATTY LIVER: ICD-10-CM

## 2023-02-20 PROCEDURE — 99214 OFFICE O/P EST MOD 30 MIN: CPT | Performed by: SURGERY
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
## 2023-02-22 ENCOUNTER — OFFICE VISIT (OUTPATIENT)
Dept: CARDIOLOGY | Facility: CLINIC | Age: 73
End: 2023-02-22
Payer: OTHER GOVERNMENT

## 2023-02-22 VITALS
OXYGEN SATURATION: 94 % | HEART RATE: 88 BPM | BODY MASS INDEX: 28.39 KG/M2 | WEIGHT: 202.8 LBS | SYSTOLIC BLOOD PRESSURE: 139 MMHG | HEIGHT: 71 IN | DIASTOLIC BLOOD PRESSURE: 78 MMHG

## 2023-02-22 DIAGNOSIS — I10 ESSENTIAL HYPERTENSION: ICD-10-CM

## 2023-02-22 DIAGNOSIS — E78.2 MIXED HYPERLIPIDEMIA: ICD-10-CM

## 2023-02-22 DIAGNOSIS — I25.10 ASCVD (ARTERIOSCLEROTIC CARDIOVASCULAR DISEASE): Primary | ICD-10-CM

## 2023-02-22 PROCEDURE — 99213 OFFICE O/P EST LOW 20 MIN: CPT | Performed by: PHYSICIAN ASSISTANT

## 2023-02-22 NOTE — PROGRESS NOTES
Rita Orr PA  Bryson Baker  1950 02/22/2023    Patient Active Problem List   Diagnosis   • Essential hypertension   • Mixed hyperlipidemia   • ASCVD with 50% stenosis in RCA in 2013   • PVD (peripheral vascular disease) (HCC)   • Precordial chest pain   • Tobacco abuse   • Palpitations   • Emphysema   • Multiple pulmonary nodules   • GAYTAN (dyspnea on exertion)   • History of colon polyps   • Diastasis recti   • Umbilical hernia without obstruction and without gangrene   • Gastroesophageal reflux disease without esophagitis   • Acute pancreatitis without infection or necrosis, unspecified pancreatitis type   • Fatty liver   • Lumbar degenerative disc disease   • Left inguinal hernia   • Abnormal CT of the abdomen   • Obesity due to excess calories   • Diverticulitis       Dear Rita Orr PA:    Subjective     History of Present Illness:    Chief Complaint   Patient presents with   • Follow-up     ROUTINE   • Shortness of Breath     INCREASING       Bryson Baker is a pleasant 72 y.o. male with a past medical history significant for nonobstructive CAD with 50% stenosis seen in the RCA in 2014, tobacco abuse, essential hypertension.  He comes in today for cardiology follow-up.    Clinically Mr. Baker denies any chest pains, shortness of breath worsening from baseline, palpitations, dizziness, or syncope.    No Known Allergies:      Current Outpatient Medications:   •  albuterol sulfate  (90 Base) MCG/ACT inhaler, Inhale 2 puffs Every 4 (Four) Hours As Needed for Wheezing., Disp: 18 g, Rfl: 5  •  aspirin 81 MG EC tablet, Take 81 mg by mouth 2 (Two) Times a Day., Disp: , Rfl:   •  atorvastatin (LIPITOR) 40 MG tablet, Take 20 mg by mouth Daily., Disp: , Rfl:   •  celecoxib (CeleBREX) 200 MG capsule, Take 1 capsule by mouth Daily., Disp: 90 capsule, Rfl: 3  •  Fluticasone-Salmeterol (ADVAIR/WIXELA) 250-50 MCG/ACT DISKUS, Inhale 1 puff 2 (Two) Times a Day., Disp: , Rfl:   •  gabapentin  "(NEURONTIN) 400 MG capsule, Take 400 mg by mouth 3 (Three) Times a Day., Disp: , Rfl:   •  HYDROcodone-acetaminophen (NORCO)  MG per tablet, Take 1 tablet by mouth 3 (Three) Times a Day., Disp: , Rfl:   •  losartan (Cozaar) 25 MG tablet, Take 1 tablet by mouth Daily., Disp: 30 tablet, Rfl: 3  •  pantoprazole (PROTONIX) 40 MG EC tablet, Take 40 mg by mouth 2 (Two) Times a Day., Disp: , Rfl:   •  ranolazine (RANEXA) 500 MG 12 hr tablet, Take 1 tablet by mouth 2 (Two) Times a Day., Disp: 180 tablet, Rfl: 1  •  tiotropium bromide monohydrate (SPIRIVA RESPIMAT) 2.5 MCG/ACT aerosol solution inhaler, Inhale 1 puff Daily., Disp: 1 inhaler, Rfl: 5    The following portions of the patient's history were reviewed and updated as appropriate: allergies, current medications, past family history, past medical history, past social history, past surgical history and problem list.    Social History     Tobacco Use   • Smoking status: Every Day     Packs/day: 1.00     Years: 5.00     Pack years: 5.00     Types: Cigarettes   • Smokeless tobacco: Never   • Tobacco comments:     had quit but started back today 10/23/18   Vaping Use   • Vaping Use: Never used   Substance Use Topics   • Alcohol use: No   • Drug use: No         Objective   Vitals:    02/22/23 1242   BP: 139/78   Pulse: 88   SpO2: 94%   Weight: 92 kg (202 lb 12.8 oz)   Height: 180.3 cm (71\")     Body mass index is 28.28 kg/m².    Constitutional:       General: Not in acute distress.     Appearance: Healthy appearance. Well-developed and not in distress. Not diaphoretic.   Eyes:      Conjunctiva/sclera: Conjunctivae normal.      Pupils: Pupils are equal, round, and reactive to light.   HENT:      Head: Normocephalic and atraumatic.   Neck:      Vascular: No carotid bruit or JVD.   Pulmonary:      Effort: Pulmonary effort is normal. No respiratory distress.      Breath sounds: Normal breath sounds.   Cardiovascular:      Normal rate. Regular rhythm.   Skin:     General: " Skin is cool.   Neurological:      Mental Status: Alert, oriented to person, place, and time and oriented to person, place and time.         Lab Results   Component Value Date     12/01/2022    K 3.8 12/01/2022     12/01/2022    CO2 21.2 (L) 12/01/2022    BUN 12 12/01/2022    CREATININE 1.10 01/04/2023    GLUCOSE 84 12/01/2022    CALCIUM 8.3 (L) 12/01/2022    AST 10 12/01/2022    ALT 12 12/01/2022    ALKPHOS 96 12/01/2022    LABIL2 1.7 10/17/2022     Lab Results   Component Value Date    CKTOTAL 103 10/17/2022     Lab Results   Component Value Date    WBC 6.80 12/01/2022    HGB 14.2 12/01/2022    HCT 42.3 12/01/2022     12/01/2022     Lab Results   Component Value Date    INR 1.14 (H) 08/30/2018     Lab Results   Component Value Date    MG 2.0 11/30/2022     Lab Results   Component Value Date    TSH 1.610 10/18/2022    CHLPL 116 10/10/2022    TRIG 126 11/30/2022    HDL 30 (L) 11/30/2022    LDL 55 11/30/2022      Lab Results   Component Value Date    BNP 43.0 09/07/2018       During this visit the following were done:  Labs Reviewed []    Labs Ordered []    Radiology Reports Reviewed []    Radiology Ordered []    PCP Records Reviewed []    Referring Provider Records Reviewed []    ER Records Reviewed []    Hospital Records Reviewed []    History Obtained From Family []    Radiology Images Reviewed []    Other Reviewed []    Records Requested []       Procedures    Assessment & Plan    Diagnosis Plan   1. ASCVD with 50% stenosis in RCA in 2013        2. Mixed hyperlipidemia        3. Essential hypertension                 Recommendations:  1. ASCVD  a. Denies any recent anginal symptoms.  Continue with GDMT for now.  I will continue aspirin, Lipitor, losartan, and Ranexa.    No follow-ups on file.    As always, I appreciate very much the opportunity to participate in the cardiovascular care of your patients.      With Best Regards,    Danny Oneal PA-C

## 2023-02-27 ENCOUNTER — OFFICE VISIT (OUTPATIENT)
Dept: PULMONOLOGY | Facility: CLINIC | Age: 73
End: 2023-02-27
Payer: OTHER GOVERNMENT

## 2023-02-27 VITALS
HEIGHT: 71 IN | OXYGEN SATURATION: 95 % | SYSTOLIC BLOOD PRESSURE: 142 MMHG | DIASTOLIC BLOOD PRESSURE: 82 MMHG | BODY MASS INDEX: 28.28 KG/M2 | TEMPERATURE: 97.5 F | HEART RATE: 75 BPM | WEIGHT: 202 LBS

## 2023-02-27 DIAGNOSIS — E66.3 OVERWEIGHT: ICD-10-CM

## 2023-02-27 DIAGNOSIS — J44.9 COPD MIXED TYPE: Primary | ICD-10-CM

## 2023-02-27 DIAGNOSIS — R05.3 CHRONIC COUGH: ICD-10-CM

## 2023-02-27 DIAGNOSIS — Z72.0 TOBACCO ABUSE: ICD-10-CM

## 2023-02-27 PROCEDURE — 99214 OFFICE O/P EST MOD 30 MIN: CPT | Performed by: NURSE PRACTITIONER

## 2023-02-27 RX ORDER — GUAIFENESIN 200 MG/10ML
200 LIQUID ORAL 3 TIMES DAILY PRN
Qty: 1000 ML | Refills: 3 | Status: SHIPPED | OUTPATIENT
Start: 2023-02-27

## 2023-02-27 NOTE — PROGRESS NOTES
"Chief Complaint  Emphysema    Subjective        Bryson Baker presents to [facility]MON[facility]ARE MEDICINE  History of Present Illness     Mr. Baker is a 72 year old male with a medical history significant for anemia, anxiety, asthma, CHF, COPD, CAD, diabetes, GERD, hypertension, and sleep apnea.    He presents today for follow up on emphysema.  He states that he has been doing well since his last visit.  He reports that his shortness of breath is at baseline.  He does tell me that he feels that his cough is worse at night.  He states that he is suppose to use a cpap at night but was unable to use it because it dries his mouth out.  He is currently taking Symbicort BID, Spiriva once daily and albuterol as needed.  He does continue to smoke about 1 ppd.            Objective   Vital Signs:  /82   Pulse 75   Temp 97.5 °F (36.4 °C) (Temporal)   Ht 180.3 cm (71\")   Wt 91.6 kg (202 lb)   SpO2 95%   BMI 28.17 kg/m²   Estimated body mass index is 28.17 kg/m² as calculated from the following:    Height as of this encounter: 180.3 cm (71\").    Weight as of this encounter: 91.6 kg (202 lb).       BMI is >= 25 and <30. (Overweight) The following options were offered after discussion;: exercise counseling/recommendations and nutrition counseling/recommendations      Physical Exam     GENERAL APPEARANCE: Well developed, well nourished, alert and cooperative, and appears to be in no acute distress.    HEAD: normocephalic. Atraumatic.    EYES: PERRL, EOMI. Vision is grossly intact.    THROAT: Oral cavity and pharynx normal. No inflammation, swelling, exudate, or lesions.     NECK: Neck supple.  No thyromegaly.    CARDIAC: Normal S1 and S2. No S3, S4 or murmurs. Rhythm is regular.     RESPIRATORY:Bilateral air entry positive. Bilateral diminished breath sounds. No wheezing, crackles or rhonchi noted.    GI: Positive bowel sounds. Soft, nondistended, nontender.     MUSCULOSKELETAL: No " significant deformity or joint abnormality. No edema. Peripheral pulses intact. No varicosities.    NEUROLOGICAL: Strength and sensation symmetric and intact throughout.     PSYCHIATRIC: The mental examination revealed the patient was oriented to person, place, and time.     Result Review :  The following data was reviewed by: ANGELIC Michaels on 02/27/2023:  Common labs    Common Labs 11/30/22 11/30/22 11/30/22 12/1/22 12/1/22 1/4/23    1117 1117 1324 0116 0116    Glucose  101 (A)  84     BUN  15  12     Creatinine  1.02  0.89  1.10   Sodium  136  136     Potassium  4.4  3.8     Chloride  101  105     Calcium  8.8  8.3 (A)     Albumin  3.78  3.26 (A)     Total Bilirubin  0.9  1.0     Alkaline Phosphatase  111  96     AST (SGOT)  13  10     ALT (SGPT)  14  12     WBC 8.12    6.80    Hemoglobin 16.1    14.2    Hematocrit 48.1    42.3    Platelets 316    261    Total Cholesterol   108      Triglycerides   126      HDL Cholesterol   30 (A)      LDL Cholesterol    55      (A) Abnormal value       Comments are available for some flowsheets but are not being displayed.                Assessment and Plan   Diagnoses and all orders for this visit:    1. COPD mixed type (HCC) (Primary)  -     Overnight Sleep Oximetry Study; Future    2. Tobacco abuse    3. Overweight    4. Chronic cough    Other orders  -     guaifenesin (ROBITUSSIN) 100 MG/5ML liquid; Take 10 mL by mouth 3 (Three) Times a Day As Needed for Cough.  Dispense: 1000 mL; Refill: 3          CT imaging was reviewed.  Bilateral pulmonary nodule were noted to be stable.  We will plan to repeat CT chest at a 6 month interval.    Continue Symbicort BID.  Continue Spiriva once daily.  Continue albuterol every 4 hours as needed.       Bryson Baker  reports that he has been smoking cigarettes. He has been smoking an average of 1 pack per day. He has never used smokeless tobacco.. I have educated him on the risk of diseases from using tobacco products such as  cancer, COPD and heart disease.     I advised him to quit and he is not willing to quit.    Has been unable to use cpap in the past.  Will order an overnight pulse oximetry study to evaluate oxygen saturation during sleep.    Sent in cough medicine for cough.        Follow Up   Return in about 5 months (around 7/19/2023).  Patient was given instructions and counseling regarding his condition or for health maintenance advice. Please see specific information pulled into the AVS if appropriate.

## 2023-06-14 ENCOUNTER — HOSPITAL ENCOUNTER (OUTPATIENT)
Dept: CT IMAGING | Facility: HOSPITAL | Age: 73
Discharge: HOME | End: 2023-06-14
Admitting: NURSE PRACTITIONER
Payer: OTHER GOVERNMENT

## 2023-06-14 DIAGNOSIS — R91.8 MULTIPLE PULMONARY NODULES: ICD-10-CM

## 2023-06-14 PROCEDURE — 71250 CT THORAX DX C-: CPT

## 2023-06-14 PROCEDURE — 71250 CT THORAX DX C-: CPT | Performed by: RADIOLOGY

## 2023-08-03 ENCOUNTER — TELEPHONE (OUTPATIENT)
Dept: PULMONOLOGY | Facility: CLINIC | Age: 73
End: 2023-08-03
Payer: OTHER GOVERNMENT

## 2023-08-03 DIAGNOSIS — J44.9 COPD WITH HYPOXIA: Primary | ICD-10-CM

## 2023-08-03 DIAGNOSIS — R09.02 COPD WITH HYPOXIA: Primary | ICD-10-CM

## 2023-08-10 ENCOUNTER — OFFICE VISIT (OUTPATIENT)
Dept: FAMILY MEDICINE CLINIC | Facility: CLINIC | Age: 73
End: 2023-08-10
Payer: OTHER GOVERNMENT

## 2023-08-10 VITALS
BODY MASS INDEX: 28.14 KG/M2 | HEART RATE: 68 BPM | OXYGEN SATURATION: 97 % | SYSTOLIC BLOOD PRESSURE: 138 MMHG | HEIGHT: 71 IN | DIASTOLIC BLOOD PRESSURE: 74 MMHG | TEMPERATURE: 98.8 F | WEIGHT: 201 LBS

## 2023-08-10 DIAGNOSIS — E78.2 MIXED HYPERLIPIDEMIA: Chronic | ICD-10-CM

## 2023-08-10 DIAGNOSIS — J44.9 COPD MIXED TYPE: Chronic | ICD-10-CM

## 2023-08-10 DIAGNOSIS — I10 ESSENTIAL HYPERTENSION: ICD-10-CM

## 2023-08-10 DIAGNOSIS — K21.9 GASTROESOPHAGEAL REFLUX DISEASE WITHOUT ESOPHAGITIS: Chronic | ICD-10-CM

## 2023-08-10 DIAGNOSIS — I10 ESSENTIAL HYPERTENSION: Primary | Chronic | ICD-10-CM

## 2023-08-10 PROCEDURE — 99214 OFFICE O/P EST MOD 30 MIN: CPT | Performed by: PHYSICIAN ASSISTANT

## 2023-08-10 RX ORDER — AMLODIPINE BESYLATE 5 MG/1
5 TABLET ORAL DAILY
Qty: 90 TABLET | Refills: 3 | Status: SHIPPED | OUTPATIENT
Start: 2023-08-10

## 2023-08-10 RX ORDER — LOSARTAN POTASSIUM 100 MG/1
100 TABLET ORAL DAILY
Qty: 90 TABLET | Refills: 3 | Status: SHIPPED | OUTPATIENT
Start: 2023-08-10

## 2023-08-10 NOTE — PROGRESS NOTES
"Subjective   Bryson Baker is a 73 y.o. male.       Chief Complaint -hypertension    History of Present Illness -       Hypertension-  Not at goal with losartan 100 mg daily.  Patient reports that blood pressure has been running 140-150s systolic at home.    Hyperlipidemia-  Stable with atorvastatin and low-cholesterol diet    Gastroesophageal reflux disease-  Stable with pantoprazole and dietary modification    Emphysema-  Stable with Spiriva, Advair and albuterol    The following portions of the patient's history were reviewed and updated as appropriate: allergies, current medications, past family history, past medical history, past social history, past surgical history and problem list.        Objective  Vital signs:  /74   Pulse 68   Temp 98.8 øF (37.1 øC) (Temporal)   Ht 180.3 cm (71\")   Wt 91.2 kg (201 lb)   SpO2 97%   BMI 28.03 kg/mý     Physical Exam  Vitals and nursing note reviewed.   Constitutional:       Appearance: Normal appearance. He is well-developed.   Eyes:      Extraocular Movements: Extraocular movements intact.      Conjunctiva/sclera: Conjunctivae normal.   Cardiovascular:      Rate and Rhythm: Normal rate and regular rhythm.      Heart sounds: Normal heart sounds. No murmur heard.  Pulmonary:      Effort: Pulmonary effort is normal. No respiratory distress.      Breath sounds: Normal breath sounds. No wheezing.   Musculoskeletal:         General: No tenderness.   Skin:     General: Skin is warm and dry.      Findings: No rash.   Neurological:      Mental Status: He is alert and oriented to person, place, and time.   Psychiatric:         Mood and Affect: Mood normal.         Behavior: Behavior normal.         Thought Content: Thought content normal.       The following data was reviewed by Rita Orr PA-C:         Lab Results   Component Value Date    BUN 12 12/01/2022    CREATININE 1.10 01/04/2023    EGFR 91.6 12/01/2022    ALT 12 12/01/2022    AST 10 12/01/2022    WBC 6.80 " 12/01/2022    HGB 14.2 12/01/2022    HCT 42.3 12/01/2022     12/01/2022    CHOL 108 11/30/2022    TRIG 126 11/30/2022    HDL 30 (L) 11/30/2022    LDL 55 11/30/2022    TSH 1.610 10/18/2022    HGBA1C 5.40 10/18/2022           Assessment & Plan     Diagnoses and all orders for this visit:    1. Essential hypertension (Primary)  Comments:  Continue losartan  Start amlodipine 5 mg daily  Advise daily BP and pulse log  Orders:  -     amLODIPine (NORVASC) 5 MG tablet; Take 1 tablet by mouth Daily.  Dispense: 90 tablet; Refill: 3  -     losartan (COZAAR) 100 MG tablet; Take 1 tablet by mouth Daily.  Dispense: 90 tablet; Refill: 3    2. Essential hypertension  Comments:  Advised 5 mg amlodipine with 100 mg losartan daily for the next 2 weeks then if blood pressure greater than 140/90 increase amlodipine 10 mg daily.  Orders:  -     amLODIPine (NORVASC) 5 MG tablet; Take 1 tablet by mouth Daily.  Dispense: 90 tablet; Refill: 3  -     losartan (COZAAR) 100 MG tablet; Take 1 tablet by mouth Daily.  Dispense: 90 tablet; Refill: 3    3. Mixed hyperlipidemia  Comments:  Continue atorvastatin  Advise low-cholesterol diet    4. Gastroesophageal reflux disease without esophagitis  Comments:  Continue pantoprazole  Advised to avoid known trigger foods    5. Emphysema  Comments:  Continue Advair and Spiriva with albuterol use as needed                   Patient was given instructions and counseling regarding his condition or for health maintenance advice. Please see specific information pulled into the AVS if appropriate      This document has been electronically signed by:  Rita Orr PA-C

## 2023-08-10 NOTE — PATIENT INSTRUCTIONS
"Fat and Cholesterol Restricted Eating Plan  Getting too much fat and cholesterol in your diet may cause health problems. Choosing the right foods helps keep your fat and cholesterol at normal levels. This can keep you from getting certain diseases.  Your doctor may recommend an eating plan that includes:  Total fat: ______% or less of total calories a day. This is ______g of fat a day.  Saturated fat: ______% or less of total calories a day. This is ______g of saturated fat a day.  Cholesterol: less than _________mg a day.  Fiber: ______g a day.  What are tips for following this plan?  General tips  Work with your doctor to lose weight if you need to.  Avoid:  Foods with added sugar.  Fried foods.  Foods with trans fat or partially hydrogenated oils. This includes some margarines and baked goods.  If you drink alcohol:  Limit how much you have to:  0-1 drink a day for women who are not pregnant.  0-2 drinks a day for men.  Know how much alcohol is in a drink. In the [place] one drink equals one 12 oz bottle of beer (355 mL), one 5 oz glass of wine (148 mL), or one 1« oz glass of hard liquor (44 mL).  Reading food labels  Check food labels for:  Trans fats.  Partially hydrogenated oils.  Saturated fat (g) in each serving.  Cholesterol (mg) in each serving.  Fiber (g) in each serving.  Choose foods with healthy fats, such as:  Monounsaturated fats and polyunsaturated fats. These include olive and canola oil, flaxseeds, walnuts, almonds, and seeds.  Omega-3 fats. These are found in certain fish, flaxseed oil, and ground flaxseeds.  Choose grain products that have whole grains. Look for the word \"whole\" as the first word in the ingredient list.  Cooking  Cook foods using low-fat methods. These include baking, boiling, grilling, and broiling.  Eat more home-cooked foods. Eat at restaurants and buffets less often. Eat less fast food.  Avoid cooking using saturated fats, such as butter, cream, palm oil, palm kernel oil, and " coconut oil.  Meal planning    At meals, divide your plate into four equal parts:  Fill one-half of your plate with vegetables, green salads, and fruit.  Fill one-fourth of your plate with whole grains.  Fill one-fourth of your plate with low-fat (lean) protein foods.  Eat fish that is high in omega-3 fats at least two times a week. This includes mackerel, tuna, sardines, and salmon.  Eat foods that are high in fiber, such as whole grains, beans, apples, pears, berries, broccoli, carrots, peas, and barley.  What foods should I eat?  Fruits  All fresh, canned (in natural juice), or frozen fruits.  Vegetables  Fresh or frozen vegetables (raw, steamed, roasted, or grilled). Green salads.  Grains  Whole grains, such as whole wheat or whole grain breads, crackers, cereals, and pasta. Unsweetened oatmeal, bulgur, barley, quinoa, or brown rice. Corn or whole wheat flour tortillas.  Meats and other protein foods  Ground beef (85% or leaner), grass-fed beef, or beef trimmed of fat. Skinless chicken or turkey. Ground chicken or turkey. Pork trimmed of fat. All fish and seafood. Egg whites. Dried beans, peas, or lentils. Unsalted nuts or seeds. Unsalted canned beans. Nut butters without added sugar or oil.  Dairy  Low-fat or nonfat dairy products, such as skim or 1% milk, 2% or reduced-fat cheeses, low-fat and fat-free ricotta or cottage cheese, or plain low-fat and nonfat yogurt.  Fats and oils  Tub margarine without trans fats. Light or reduced-fat mayonnaise and salad dressings. Avocado. Olive, canola, sesame, or safflower oils.  The items listed above may not be a complete list of foods and beverages you can eat. Contact a dietitian for more information.  What foods should I avoid?  Fruits  Canned fruit in heavy syrup. Fruit in cream or butter sauce. Fried fruit.  Vegetables  Vegetables cooked in cheese, cream, or butter sauce. Fried vegetables.  Grains  White bread. White pasta. White rice. Cornbread. Bagels, pastries,  and croissants. Crackers and snack foods that contain trans fat and hydrogenated oils.  Meats and other protein foods  Fatty cuts of meat. Ribs, chicken wings, griffin, sausage, bologna, salami, chitterlings, fatback, hot dogs, bratwurst, and packaged lunch meats. Liver and organ meats. Whole eggs and egg yolks. Chicken and turkey with skin. Fried meat.  Dairy  Whole or 2% milk, cream, half-and-half, and cream cheese. Whole milk cheeses. Whole-fat or sweetened yogurt. Full-fat cheeses. Nondairy creamers and whipped toppings. Processed cheese, cheese spreads, and cheese curds.  Fats and oils  Butter, stick margarine, lard, shortening, ghee, or griffin fat. Coconut, palm kernel, and palm oils.  Beverages  Alcohol. Sugar-sweetened drinks such as sodas, lemonade, and fruit drinks.  Sweets and desserts  Corn syrup, sugars, honey, and molasses. Candy. Jam and jelly. Syrup. Sweetened cereals. Cookies, pies, cakes, donuts, muffins, and ice cream.  The items listed above may not be a complete list of foods and beverages you should avoid. Contact a dietitian for more information.  Summary  Choosing the right foods helps keep your fat and cholesterol at normal levels. This can keep you from getting certain diseases.  At meals, fill one-half of your plate with vegetables, green salads, and fruits.  Eat high fiber foods, like whole grains, beans, apples, pears, berries, carrots, peas, and barley.  Limit added sugar, saturated fats, alcohol, and fried foods.  This information is not intended to replace advice given to you by your health care provider. Make sure you discuss any questions you have with your health care provider.  Document Revised: 04/29/2022 Document Reviewed: 04/29/2022  Elsevier Patient Education c 2023 [employer]

## 2023-08-22 ENCOUNTER — OFFICE VISIT (OUTPATIENT)
Dept: CARDIOLOGY | Facility: CLINIC | Age: 73
End: 2023-08-22
Payer: OTHER GOVERNMENT

## 2023-08-22 VITALS
HEART RATE: 58 BPM | RESPIRATION RATE: 18 BRPM | HEIGHT: 71 IN | OXYGEN SATURATION: 95 % | BODY MASS INDEX: 28.28 KG/M2 | DIASTOLIC BLOOD PRESSURE: 68 MMHG | WEIGHT: 202 LBS | SYSTOLIC BLOOD PRESSURE: 114 MMHG

## 2023-08-22 DIAGNOSIS — I10 ESSENTIAL HYPERTENSION: ICD-10-CM

## 2023-08-22 DIAGNOSIS — I25.10 ASCVD (ARTERIOSCLEROTIC CARDIOVASCULAR DISEASE): Primary | ICD-10-CM

## 2023-08-22 DIAGNOSIS — E78.2 MIXED HYPERLIPIDEMIA: ICD-10-CM

## 2023-08-22 PROCEDURE — 99214 OFFICE O/P EST MOD 30 MIN: CPT | Performed by: PHYSICIAN ASSISTANT

## 2023-08-22 PROCEDURE — 93000 ELECTROCARDIOGRAM COMPLETE: CPT | Performed by: PHYSICIAN ASSISTANT

## 2023-08-22 RX ORDER — RANOLAZINE 1000 MG/1
1000 TABLET, EXTENDED RELEASE ORAL 2 TIMES DAILY
Qty: 60 TABLET | Refills: 3 | Status: SHIPPED | OUTPATIENT
Start: 2023-08-22 | End: 2023-08-22 | Stop reason: SDUPTHER

## 2023-08-22 RX ORDER — RANOLAZINE 1000 MG/1
1000 TABLET, EXTENDED RELEASE ORAL 2 TIMES DAILY
Qty: 60 TABLET | Refills: 3 | Status: SHIPPED | OUTPATIENT
Start: 2023-08-22

## 2023-08-22 NOTE — PROGRESS NOTES
"Rita Orr PA  Bryson Baker  1950 08/22/2023    Patient Active Problem List   Diagnosis    Essential hypertension    Mixed hyperlipidemia    ASCVD with 50% stenosis in RCA in 2013    PVD (peripheral vascular disease)    Precordial chest pain    Tobacco abuse    Palpitations    Emphysema    Multiple pulmonary nodules    GAYTAN (dyspnea on exertion)    History of colon polyps    Diastasis recti    Umbilical hernia without obstruction and without gangrene    Gastroesophageal reflux disease without esophagitis    Acute pancreatitis without infection or necrosis, unspecified pancreatitis type    Fatty liver    Lumbar degenerative disc disease    Left inguinal hernia    Abnormal CT of the abdomen    Obesity due to excess calories    Diverticulitis       Dear Rita Orr PA:    Subjective     History of Present Illness:    Chief Complaint   Patient presents with    Follow-up     ROUTINE       Bryson Baker is a pleasant 73 y.o. male with a past medical history significant for  nonobstructive CAD with 50% stenosis seen in the RCA in 2014, tobacco abuse, essential hypertension.  He comes in today for cardiology follow-up.     Mr. Baker comes in today denying any issues with open questions.  Upon further questioning he does admit to some chest pains that he states occurs once every couple weeks that would last for 2 to 3 minutes.  He reports these typically only come on at rest and improve after he belches.  He does describe this pain as sharp like a \"knife is poking\" He denies being able to reproduce this pain on physical exertion or Beil to make it worse with walking.  He does state he gets short of breath on exertion particularly when he walks up an incline but also reports this has been stable over many months.  He did recently have CT of the chest which did show some cardiomegaly which has been seen on previous scans as well.    No Known Allergies:      Current Outpatient Medications:     " albuterol sulfate  (90 Base) MCG/ACT inhaler, Inhale 2 puffs Every 4 (Four) Hours As Needed for Wheezing., Disp: 18 g, Rfl: 5    amLODIPine (NORVASC) 5 MG tablet, Take 1 tablet by mouth Daily., Disp: 90 tablet, Rfl: 3    aspirin 81 MG EC tablet, Take 1 tablet by mouth 2 (Two) Times a Day., Disp: , Rfl:     atorvastatin (LIPITOR) 40 MG tablet, Take 0.5 tablets by mouth Daily., Disp: , Rfl:     celecoxib (CeleBREX) 200 MG capsule, Take 1 capsule by mouth Daily., Disp: 90 capsule, Rfl: 3    Fluticasone-Salmeterol (ADVAIR/WIXELA) 250-50 MCG/ACT DISKUS, Inhale 1 puff 2 (Two) Times a Day., Disp: , Rfl:     gabapentin (NEURONTIN) 400 MG capsule, Take 1 capsule by mouth 3 (Three) Times a Day., Disp: , Rfl:     HYDROcodone-acetaminophen (NORCO)  MG per tablet, Take 1 tablet by mouth 3 (Three) Times a Day., Disp: , Rfl:     losartan (COZAAR) 100 MG tablet, Take 1 tablet by mouth Daily., Disp: 90 tablet, Rfl: 3    pantoprazole (PROTONIX) 40 MG EC tablet, Take 1 tablet by mouth 2 (Two) Times a Day., Disp: , Rfl:     ranolazine (Ranexa) 1000 MG 12 hr tablet, Take 1 tablet by mouth 2 (Two) Times a Day., Disp: 60 tablet, Rfl: 3    tiotropium bromide monohydrate (SPIRIVA RESPIMAT) 2.5 MCG/ACT aerosol solution inhaler, Inhale 1 puff Daily., Disp: 1 inhaler, Rfl: 5    The following portions of the patient's history were reviewed and updated as appropriate: allergies, current medications, past family history, past medical history, past social history, past surgical history and problem list.    Social History     Tobacco Use    Smoking status: Every Day     Packs/day: 1.00     Years: 15.00     Pack years: 15.00     Types: Cigarettes     Passive exposure: Current    Smokeless tobacco: Never    Tobacco comments:     had quit but started back today 10/23/18   Vaping Use    Vaping Use: Never used   Substance Use Topics    Alcohol use: No    Drug use: No         Objective   Vitals:    08/22/23 1337   BP: 114/68   BP Location:  "Left arm   Patient Position: Sitting   Cuff Size: Adult   Pulse: 58   Resp: 18   SpO2: 95%   Weight: 91.6 kg (202 lb)   Height: 180.3 cm (71\")     Body mass index is 28.17 kg/mý.    Constitutional:       General: Not in acute distress.     Appearance: Healthy appearance. Well-developed and not in distress. Not diaphoretic.   Eyes:      Conjunctiva/sclera: Conjunctivae normal.      Pupils: Pupils are equal, round, and reactive to light.   HENT:      Head: Normocephalic and atraumatic.   Neck:      Vascular: No carotid bruit or JVD.   Pulmonary:      Effort: Pulmonary effort is normal. No respiratory distress.      Breath sounds: Normal breath sounds.   Cardiovascular:      Normal rate. Regular rhythm.   Edema:     Peripheral edema absent.   Skin:     General: Skin is cool.   Neurological:      Mental Status: Alert, oriented to person, place, and time and oriented to person, place and time.       Lab Results   Component Value Date     12/01/2022    K 3.8 12/01/2022     12/01/2022    CO2 21.2 (L) 12/01/2022    BUN 12 12/01/2022    CREATININE 1.10 01/04/2023    GLUCOSE 84 12/01/2022    CALCIUM 8.3 (L) 12/01/2022    AST 10 12/01/2022    ALT 12 12/01/2022    ALKPHOS 96 12/01/2022    LABIL2 1.7 10/17/2022     Lab Results   Component Value Date    CKTOTAL 103 10/17/2022     Lab Results   Component Value Date    WBC 6.80 12/01/2022    HGB 14.2 12/01/2022    HCT 42.3 12/01/2022     12/01/2022     Lab Results   Component Value Date    INR 1.14 (H) 08/30/2018     Lab Results   Component Value Date    MG 2.0 11/30/2022     Lab Results   Component Value Date    TSH 1.610 10/18/2022    CHLPL 116 10/10/2022    TRIG 126 11/30/2022    HDL 30 (L) 11/30/2022    LDL 55 11/30/2022      Lab Results   Component Value Date    BNP 43.0 09/07/2018       During this visit the following were done:  Labs Reviewed []    Labs Ordered []    Radiology Reports Reviewed []    Radiology Ordered []    PCP Records Reviewed []  "   Referring Provider Records Reviewed []    ER Records Reviewed []    Hospital Records Reviewed []    History Obtained From Family []    Radiology Images Reviewed []    Other Reviewed []    Records Requested []         ECG 12 Lead    Date/Time: 8/22/2023 2:07 PM  Performed by: Danny Oneal PA-C  Authorized by: Danny Oneal PA-C   Comparison: compared with previous ECG   Similar to previous ECG  Rhythm: sinus rhythm  Conduction: conduction normal  ST Segments: ST segments normal        Assessment & Plan    Diagnosis Plan   1. ASCVD with 50% stenosis in RCA in 2013        2. Mixed hyperlipidemia        3. Essential hypertension                 Recommendations:  Precordial pain  I did recommend further GDMT today given his chest pains although they do have some atypical features.  I will increase Ranexa to 1000 mg.  He has been intolerant to Imdur in the past.  Continue aspirin, Lipitor, losartan, and amlodipine.  I did explain to him that if he continues to have symptoms may consider left heart catheterization given known 50% stenosis in the RCA in 2013.  He expressed understanding.  Essential hypertension  Excellently controlled in the office he does still report occasional highs I asked him to bring in a detailed blood pressure log at next visit.    Return in about 4 weeks (around 9/19/2023).    As always, I appreciate very much the opportunity to participate in the cardiovascular care of your patients.      With Best Regards,    Danny Oneal PA-C

## 2023-09-11 ENCOUNTER — OFFICE VISIT (OUTPATIENT)
Dept: FAMILY MEDICINE CLINIC | Facility: CLINIC | Age: 73
End: 2023-09-11
Payer: OTHER GOVERNMENT

## 2023-09-11 VITALS
WEIGHT: 202 LBS | TEMPERATURE: 98.6 F | SYSTOLIC BLOOD PRESSURE: 122 MMHG | HEIGHT: 71 IN | DIASTOLIC BLOOD PRESSURE: 76 MMHG | HEART RATE: 73 BPM | OXYGEN SATURATION: 94 % | BODY MASS INDEX: 28.28 KG/M2

## 2023-09-11 DIAGNOSIS — K76.0 FATTY LIVER: Chronic | ICD-10-CM

## 2023-09-11 DIAGNOSIS — E78.2 MIXED HYPERLIPIDEMIA: Primary | Chronic | ICD-10-CM

## 2023-09-11 DIAGNOSIS — I73.9 PVD (PERIPHERAL VASCULAR DISEASE): Chronic | ICD-10-CM

## 2023-09-11 DIAGNOSIS — Z23 IMMUNIZATION DUE: ICD-10-CM

## 2023-09-11 DIAGNOSIS — I10 ESSENTIAL HYPERTENSION: Chronic | ICD-10-CM

## 2023-09-11 PROCEDURE — 99214 OFFICE O/P EST MOD 30 MIN: CPT | Performed by: PHYSICIAN ASSISTANT

## 2023-09-11 PROCEDURE — 90472 IMMUNIZATION ADMIN EACH ADD: CPT | Performed by: PHYSICIAN ASSISTANT

## 2023-09-11 PROCEDURE — 90677 PCV20 VACCINE IM: CPT | Performed by: PHYSICIAN ASSISTANT

## 2023-09-11 PROCEDURE — 90471 IMMUNIZATION ADMIN: CPT | Performed by: PHYSICIAN ASSISTANT

## 2023-09-11 PROCEDURE — 90662 IIV NO PRSV INCREASED AG IM: CPT | Performed by: PHYSICIAN ASSISTANT

## 2023-09-11 NOTE — PATIENT INSTRUCTIONS
"Fat and Cholesterol Restricted Eating Plan  Getting too much fat and cholesterol in your diet may cause health problems. Choosing the right foods helps keep your fat and cholesterol at normal levels. This can keep you from getting certain diseases.  Your doctor may recommend an eating plan that includes:  Total fat: ______% or less of total calories a day. This is ______g of fat a day.  Saturated fat: ______% or less of total calories a day. This is ______g of saturated fat a day.  Cholesterol: less than _________mg a day.  Fiber: ______g a day.  What are tips for following this plan?  General tips  Work with your doctor to lose weight if you need to.  Avoid:  Foods with added sugar.  Fried foods.  Foods with trans fat or partially hydrogenated oils. This includes some margarines and baked goods.  If you drink alcohol:  Limit how much you have to:  0-1 drink a day for women who are not pregnant.  0-2 drinks a day for men.  Know how much alcohol is in a drink. In the [place] one drink equals one 12 oz bottle of beer (355 mL), one 5 oz glass of wine (148 mL), or one 1½ oz glass of hard liquor (44 mL).  Reading food labels  Check food labels for:  Trans fats.  Partially hydrogenated oils.  Saturated fat (g) in each serving.  Cholesterol (mg) in each serving.  Fiber (g) in each serving.  Choose foods with healthy fats, such as:  Monounsaturated fats and polyunsaturated fats. These include olive and canola oil, flaxseeds, walnuts, almonds, and seeds.  Omega-3 fats. These are found in certain fish, flaxseed oil, and ground flaxseeds.  Choose grain products that have whole grains. Look for the word \"whole\" as the first word in the ingredient list.  Cooking  Cook foods using low-fat methods. These include baking, boiling, grilling, and broiling.  Eat more home-cooked foods. Eat at restaurants and buffets less often. Eat less fast food.  Avoid cooking using saturated fats, such as butter, cream, palm oil, palm kernel oil, and " coconut oil.  Meal planning    At meals, divide your plate into four equal parts:  Fill one-half of your plate with vegetables, green salads, and fruit.  Fill one-fourth of your plate with whole grains.  Fill one-fourth of your plate with low-fat (lean) protein foods.  Eat fish that is high in omega-3 fats at least two times a week. This includes mackerel, tuna, sardines, and salmon.  Eat foods that are high in fiber, such as whole grains, beans, apples, pears, berries, broccoli, carrots, peas, and barley.  What foods should I eat?  Fruits  All fresh, canned (in natural juice), or frozen fruits.  Vegetables  Fresh or frozen vegetables (raw, steamed, roasted, or grilled). Green salads.  Grains  Whole grains, such as whole wheat or whole grain breads, crackers, cereals, and pasta. Unsweetened oatmeal, bulgur, barley, quinoa, or brown rice. Corn or whole wheat flour tortillas.  Meats and other protein foods  Ground beef (85% or leaner), grass-fed beef, or beef trimmed of fat. Skinless chicken or turkey. Ground chicken or turkey. Pork trimmed of fat. All fish and seafood. Egg whites. Dried beans, peas, or lentils. Unsalted nuts or seeds. Unsalted canned beans. Nut butters without added sugar or oil.  Dairy  Low-fat or nonfat dairy products, such as skim or 1% milk, 2% or reduced-fat cheeses, low-fat and fat-free ricotta or cottage cheese, or plain low-fat and nonfat yogurt.  Fats and oils  Tub margarine without trans fats. Light or reduced-fat mayonnaise and salad dressings. Avocado. Olive, canola, sesame, or safflower oils.  The items listed above may not be a complete list of foods and beverages you can eat. Contact a dietitian for more information.  What foods should I avoid?  Fruits  Canned fruit in heavy syrup. Fruit in cream or butter sauce. Fried fruit.  Vegetables  Vegetables cooked in cheese, cream, or butter sauce. Fried vegetables.  Grains  White bread. White pasta. White rice. Cornbread. Bagels, pastries,  and croissants. Crackers and snack foods that contain trans fat and hydrogenated oils.  Meats and other protein foods  Fatty cuts of meat. Ribs, chicken wings, griffin, sausage, bologna, salami, chitterlings, fatback, hot dogs, bratwurst, and packaged lunch meats. Liver and organ meats. Whole eggs and egg yolks. Chicken and turkey with skin. Fried meat.  Dairy  Whole or 2% milk, cream, half-and-half, and cream cheese. Whole milk cheeses. Whole-fat or sweetened yogurt. Full-fat cheeses. Nondairy creamers and whipped toppings. Processed cheese, cheese spreads, and cheese curds.  Fats and oils  Butter, stick margarine, lard, shortening, ghee, or griffin fat. Coconut, palm kernel, and palm oils.  Beverages  Alcohol. Sugar-sweetened drinks such as sodas, lemonade, and fruit drinks.  Sweets and desserts  Corn syrup, sugars, honey, and molasses. Candy. Jam and jelly. Syrup. Sweetened cereals. Cookies, pies, cakes, donuts, muffins, and ice cream.  The items listed above may not be a complete list of foods and beverages you should avoid. Contact a dietitian for more information.  Summary  Choosing the right foods helps keep your fat and cholesterol at normal levels. This can keep you from getting certain diseases.  At meals, fill one-half of your plate with vegetables, green salads, and fruits.  Eat high fiber foods, like whole grains, beans, apples, pears, berries, carrots, peas, and barley.  Limit added sugar, saturated fats, alcohol, and fried foods.  This information is not intended to replace advice given to you by your health care provider. Make sure you discuss any questions you have with your health care provider.  Document Revised: 04/29/2022 Document Reviewed: 04/29/2022  Elsevier Patient Education © 2023 [employer]

## 2023-09-11 NOTE — PROGRESS NOTES
"Subjective   Bryson Baker is a 73 y.o. male.       Chief Complaint -hyperlipidemia    History of Present Illness -       Hyperlipidemia-  Stable with atorvastatin and low-cholesterol diet    Hypertension-  Controlled with amlodipine and losartan.  Patient reports blood pressure at home is usually less than 130/80    Fatty liver disease-  Stable but lab work is due to be repeated    Peripheral vascular disease-  Stable with risk factor modification including Ranexa and aspirin    The following portions of the patient's history were reviewed and updated as appropriate: allergies, current medications, past family history, past medical history, past social history, past surgical history and problem list.        Objective  Vital signs:  /76 (BP Location: Right arm) Comment: rechecked  Pulse 73   Temp 98.6 °F (37 °C) (Temporal)   Ht 180.3 cm (71\")   Wt 91.6 kg (202 lb)   SpO2 94%   BMI 28.17 kg/m²     Physical Exam  Vitals and nursing note reviewed.   Constitutional:       Appearance: Normal appearance. He is well-developed.   Eyes:      Extraocular Movements: Extraocular movements intact.      Conjunctiva/sclera: Conjunctivae normal.   Cardiovascular:      Rate and Rhythm: Normal rate and regular rhythm.      Heart sounds: Normal heart sounds. No murmur heard.  Pulmonary:      Effort: Pulmonary effort is normal. No respiratory distress.      Breath sounds: Normal breath sounds. No wheezing.   Musculoskeletal:         General: No tenderness.   Skin:     General: Skin is warm and dry.      Findings: No rash.   Neurological:      Mental Status: He is alert and oriented to person, place, and time.   Psychiatric:         Mood and Affect: Mood normal.         Behavior: Behavior normal.         Thought Content: Thought content normal.       The following data was reviewed by Rita Orr PA-C:         Lab Results   Component Value Date    BUN 12 12/01/2022    CREATININE 1.10 01/04/2023    EGFR 91.6 12/01/2022    " ALT 12 12/01/2022    AST 10 12/01/2022    WBC 6.80 12/01/2022    HGB 14.2 12/01/2022    HCT 42.3 12/01/2022     12/01/2022    CHOL 108 11/30/2022    TRIG 126 11/30/2022    HDL 30 (L) 11/30/2022    LDL 55 11/30/2022    TSH 1.610 10/18/2022    HGBA1C 5.40 10/18/2022           Assessment & Plan     Diagnoses and all orders for this visit:    1. Mixed hyperlipidemia (Primary)  Comments:  Continue atorvastatin  Advised low-cholesterol diet  Orders:  -     Lipid Panel; Future  -     CBC & Differential; Future    2. Essential hypertension  Comments:  Continue amlodipine and losartan  Advise daily BP and pulse monitoring daily  Orders:  -     Comprehensive Metabolic Panel; Future  -     CBC & Differential; Future    3. Fatty liver  Comments:  Advised low-fat diet and avoidance of alcohol and Tylenol  Orders:  -     Comprehensive Metabolic Panel; Future    4. Immunization due  -     Fluzone High-Dose 65+yrs ([id number])  -     Pneumococcal Conjugate Vaccine 20-Valent (PCV20)    5. PVD (peripheral vascular disease)  Comments:  Advised risk factor modification including Ranexa aspirin and atorvastatin                   Patient was given instructions and counseling regarding his condition or for health maintenance advice. Please see specific information pulled into the AVS if appropriate      This document has been electronically signed by:  Rita Orr PA-C

## 2023-09-25 ENCOUNTER — OFFICE VISIT (OUTPATIENT)
Dept: CARDIOLOGY | Facility: CLINIC | Age: 73
End: 2023-09-25
Payer: OTHER GOVERNMENT

## 2023-09-25 ENCOUNTER — TELEPHONE (OUTPATIENT)
Dept: CARDIOLOGY | Facility: CLINIC | Age: 73
End: 2023-09-25
Payer: OTHER GOVERNMENT

## 2023-09-25 VITALS
WEIGHT: 202 LBS | BODY MASS INDEX: 28.28 KG/M2 | OXYGEN SATURATION: 99 % | SYSTOLIC BLOOD PRESSURE: 124 MMHG | HEIGHT: 71 IN | HEART RATE: 71 BPM | DIASTOLIC BLOOD PRESSURE: 72 MMHG

## 2023-09-25 DIAGNOSIS — I10 ESSENTIAL HYPERTENSION: ICD-10-CM

## 2023-09-25 DIAGNOSIS — I25.10 ASCVD (ARTERIOSCLEROTIC CARDIOVASCULAR DISEASE): Primary | ICD-10-CM

## 2023-09-25 DIAGNOSIS — E78.2 MIXED HYPERLIPIDEMIA: ICD-10-CM

## 2023-09-25 PROCEDURE — 99214 OFFICE O/P EST MOD 30 MIN: CPT | Performed by: PHYSICIAN ASSISTANT

## 2023-09-25 RX ORDER — RANOLAZINE 1000 MG/1
1000 TABLET, EXTENDED RELEASE ORAL 2 TIMES DAILY
Qty: 60 TABLET | Refills: 3 | Status: SHIPPED | OUTPATIENT
Start: 2023-09-25

## 2023-09-25 NOTE — Clinical Note
Can we check to see if he has ever had any issues with metoprolol or other beta blockers?? I would rather him be on metoprolol than ranexa if he hasn't.

## 2023-09-25 NOTE — TELEPHONE ENCOUNTER
----- Message from Danny Oneal PA-C sent at 9/25/2023  2:33 PM EDT -----  Can we check to see if he has ever had any issues with metoprolol or other beta blockers?? I would rather him be on metoprolol than ranexa if he hasn't.

## 2023-09-25 NOTE — PROGRESS NOTES
Rita Orr PA  Bryson Baker  1950 09/25/2023    Patient Active Problem List   Diagnosis    Essential hypertension    Mixed hyperlipidemia    ASCVD with 50% stenosis in RCA in 2013    PVD (peripheral vascular disease)    Precordial chest pain    Tobacco abuse    Palpitations    Emphysema    Multiple pulmonary nodules    GAYTAN (dyspnea on exertion)    History of colon polyps    Diastasis recti    Umbilical hernia without obstruction and without gangrene    Gastroesophageal reflux disease without esophagitis    Acute pancreatitis without infection or necrosis, unspecified pancreatitis type    Fatty liver    Lumbar degenerative disc disease    Left inguinal hernia    Abnormal CT of the abdomen    Obesity due to excess calories    Diverticulitis       Dear Rita Orr PA:    Subjective     History of Present Illness:    Chief Complaint   Patient presents with    Med Management       Bryson Baker is a pleasant 73 y.o. male with a past medical history significant for nonobstructive CAD with 50% stenosis seen in the RCA in 2014, tobacco abuse, essential hypertension.  He comes in today for cardiology follow-up.       Mr. Baker reports that he has been doing well since he was last seen since medication adjustment and further GDMT he reports his chest pains have resolved.  He does report he is able to form ADLs without any significant chest pains.  He does complain of right upper extremity edema and pain as well as erythema.  He does report he was tending to his chickens when he had one of them scratch his forearm he reports that the edema and erythema has been improving the last couple days.    No Known Allergies:      Current Outpatient Medications:     albuterol sulfate  (90 Base) MCG/ACT inhaler, Inhale 2 puffs Every 4 (Four) Hours As Needed for Wheezing., Disp: 18 g, Rfl: 5    amLODIPine (NORVASC) 5 MG tablet, Take 1 tablet by mouth Daily., Disp: 90 tablet, Rfl: 3    aspirin 81 MG EC  tablet, Take 1 tablet by mouth 2 (Two) Times a Day., Disp: , Rfl:     atorvastatin (LIPITOR) 40 MG tablet, Take 0.5 tablets by mouth Daily., Disp: , Rfl:     celecoxib (CeleBREX) 200 MG capsule, Take 1 capsule by mouth Daily., Disp: 90 capsule, Rfl: 3    Fluticasone-Salmeterol (ADVAIR/WIXELA) 250-50 MCG/ACT DISKUS, Inhale 1 puff 2 (Two) Times a Day., Disp: , Rfl:     gabapentin (NEURONTIN) 400 MG capsule, Take 1 capsule by mouth 3 (Three) Times a Day., Disp: , Rfl:     HYDROcodone-acetaminophen (NORCO)  MG per tablet, Take 1 tablet by mouth 3 (Three) Times a Day., Disp: , Rfl:     losartan (COZAAR) 100 MG tablet, Take 1 tablet by mouth Daily., Disp: 90 tablet, Rfl: 3    pantoprazole (PROTONIX) 40 MG EC tablet, Take 1 tablet by mouth 2 (Two) Times a Day., Disp: , Rfl:     ranolazine (Ranexa) 1000 MG 12 hr tablet, Take 1 tablet by mouth 2 (Two) Times a Day., Disp: 60 tablet, Rfl: 3    tiotropium bromide monohydrate (SPIRIVA RESPIMAT) 2.5 MCG/ACT aerosol solution inhaler, Inhale 1 puff Daily., Disp: 1 inhaler, Rfl: 5    metoprolol tartrate (LOPRESSOR) 25 MG tablet, Take 1 tablet by mouth 2 (Two) Times a Day., Disp: 60 tablet, Rfl: 11    sulfamethoxazole-trimethoprim (Bactrim DS) 800-160 MG per tablet, Take 1 tablet by mouth 2 (Two) Times a Day., Disp: 20 tablet, Rfl: 0    The following portions of the patient's history were reviewed and updated as appropriate: allergies, current medications, past family history, past medical history, past social history, past surgical history and problem list.    Social History     Tobacco Use    Smoking status: Every Day     Packs/day: 1.00     Years: 15.00     Pack years: 15.00     Types: Cigarettes     Passive exposure: Current    Smokeless tobacco: Never    Tobacco comments:     had quit but started back today 10/23/18   Vaping Use    Vaping Use: Never used   Substance Use Topics    Alcohol use: No    Drug use: No         Objective   Vitals:    09/25/23 1404   BP: 124/72   BP  "Location: Left arm   Patient Position: Sitting   Cuff Size: Adult   Pulse: 71   SpO2: 99%   Weight: 91.6 kg (202 lb)   Height: 180.3 cm (71\")     Body mass index is 28.17 kg/m².    Constitutional:       General: Not in acute distress.     Appearance: Healthy appearance. Well-developed and not in distress. Not diaphoretic.   Eyes:      Conjunctiva/sclera: Conjunctivae normal.      Pupils: Pupils are equal, round, and reactive to light.   HENT:      Head: Normocephalic and atraumatic.   Neck:      Vascular: No carotid bruit or JVD.   Pulmonary:      Effort: Pulmonary effort is normal. No respiratory distress.      Breath sounds: Normal breath sounds.   Cardiovascular:      Normal rate. Regular rhythm.   Edema:     Peripheral edema absent.   Musculoskeletal:      Comments: Right upper extremity edematous and erythematous Skin:     General: Skin is cool.   Neurological:      Mental Status: Alert, oriented to person, place, and time and oriented to person, place and time.       Lab Results   Component Value Date     12/01/2022    K 3.8 12/01/2022     12/01/2022    CO2 21.2 (L) 12/01/2022    BUN 12 12/01/2022    CREATININE 1.10 01/04/2023    GLUCOSE 84 12/01/2022    CALCIUM 8.3 (L) 12/01/2022    AST 10 12/01/2022    ALT 12 12/01/2022    ALKPHOS 96 12/01/2022    LABIL2 1.7 10/17/2022     Lab Results   Component Value Date    CKTOTAL 103 10/17/2022     Lab Results   Component Value Date    WBC 6.80 12/01/2022    HGB 14.2 12/01/2022    HCT 42.3 12/01/2022     12/01/2022     Lab Results   Component Value Date    INR 1.14 (H) 08/30/2018     Lab Results   Component Value Date    MG 2.0 11/30/2022     Lab Results   Component Value Date    TSH 1.610 10/18/2022    CHLPL 116 10/10/2022    TRIG 126 11/30/2022    HDL 30 (L) 11/30/2022    LDL 55 11/30/2022      Lab Results   Component Value Date    BNP 43.0 09/07/2018       During this visit the following were done:  Labs Reviewed []    Labs Ordered []    Radiology " Reports Reviewed []    Radiology Ordered []    PCP Records Reviewed []    Referring Provider Records Reviewed []    ER Records Reviewed []    Hospital Records Reviewed []    History Obtained From Family []    Radiology Images Reviewed []    Other Reviewed []    Records Requested []       Procedures    Assessment & Plan    Diagnosis Plan   1. ASCVD with 50% stenosis in RCA in 2013        2. Essential hypertension        3. Mixed hyperlipidemia                 Recommendations:  Coronary artery disease  No recent anginal chest pain since he was last seen and had GDMT adjusted.  For now we will continue this regimen with aspirin, Lipitor, losartan, Ranexa 1000 mg, and amlodipine.  Appears he has stopped metoprolol he reports he still has the medication at home denies any previous issues with this.  We will restart metoprolol tartrate 25 mg daily    No follow-ups on file.    As always, I appreciate very much the opportunity to participate in the cardiovascular care of your patients.      With Best Regards,    Danny Oneal PA-C

## 2023-09-26 DIAGNOSIS — L03.113 CELLULITIS OF RIGHT ARM: Primary | ICD-10-CM

## 2023-09-26 RX ORDER — SULFAMETHOXAZOLE AND TRIMETHOPRIM 800; 160 MG/1; MG/1
1 TABLET ORAL 2 TIMES DAILY
Qty: 20 TABLET | Refills: 0 | Status: SHIPPED | OUTPATIENT
Start: 2023-09-26

## 2023-10-03 ENCOUNTER — TELEPHONE (OUTPATIENT)
Dept: CARDIOLOGY | Facility: CLINIC | Age: 73
End: 2023-10-03
Payer: OTHER GOVERNMENT

## 2023-10-03 NOTE — TELEPHONE ENCOUNTER
----- Message from Danny Oneal PA-C sent at 10/2/2023 10:25 AM EDT -----  Can we let him know that I want him to restart metoprolol tartrate 25 mg daily.

## 2024-01-25 ENCOUNTER — OFFICE VISIT (OUTPATIENT)
Dept: CARDIOLOGY | Facility: CLINIC | Age: 74
End: 2024-01-25
Payer: OTHER GOVERNMENT

## 2024-01-25 VITALS
HEART RATE: 80 BPM | BODY MASS INDEX: 29.68 KG/M2 | OXYGEN SATURATION: 98 % | HEIGHT: 71 IN | WEIGHT: 212 LBS | DIASTOLIC BLOOD PRESSURE: 86 MMHG | SYSTOLIC BLOOD PRESSURE: 151 MMHG | RESPIRATION RATE: 16 BRPM

## 2024-01-25 DIAGNOSIS — I25.10 ASCVD (ARTERIOSCLEROTIC CARDIOVASCULAR DISEASE): Primary | ICD-10-CM

## 2024-01-25 DIAGNOSIS — I10 ESSENTIAL HYPERTENSION: ICD-10-CM

## 2024-01-25 DIAGNOSIS — E78.2 MIXED HYPERLIPIDEMIA: ICD-10-CM

## 2024-01-25 PROCEDURE — 99214 OFFICE O/P EST MOD 30 MIN: CPT | Performed by: PHYSICIAN ASSISTANT

## 2024-01-25 NOTE — PROGRESS NOTES
Rita Orr PA  Bryson Baker  1950 01/25/2024    Patient Active Problem List   Diagnosis    Essential hypertension    Mixed hyperlipidemia    ASCVD with 50% stenosis in RCA in 2013    PVD (peripheral vascular disease)    Precordial chest pain    Tobacco abuse    Palpitations    Emphysema    Multiple pulmonary nodules    GAYTAN (dyspnea on exertion)    History of colon polyps    Diastasis recti    Umbilical hernia without obstruction and without gangrene    Gastroesophageal reflux disease without esophagitis    Acute pancreatitis without infection or necrosis, unspecified pancreatitis type    Fatty liver    Lumbar degenerative disc disease    Left inguinal hernia    Abnormal CT of the abdomen    Obesity due to excess calories    Diverticulitis       Dear Rita Orr PA:    Subjective     History of Present Illness:    Chief Complaint   Patient presents with    Coronary Artery Disease     4 mos follow    Med Management     verbal    Shortness of Breath     Mild exertion    Cough     Non productive       Bryson Baker is a pleasant 73 y.o. male with a past medical history significant for nonobstructive CAD with 50% stenosis seen in the RCA in 2014, tobacco abuse, essential hypertension.  He comes in today for cardiology follow-up.        Mr. Baker reports that he has still been having some abdominal pain that he describes as sharp that lasts jut for a few seconds a couple times a week. He denies any pain when he exerts. He has had pancreatitis in the past and he states its similar to this pain but not nearly as severe. He denies any worsening shortness of breath baseline although he admits he has some chronic dyspnea. His blood pressure is elevated today but he does tell me he checks his BP at home daily and on average his systolic BP is 135 mmHg.       No Known Allergies:      Current Outpatient Medications:     albuterol sulfate  (90 Base) MCG/ACT inhaler, Inhale 2 puffs Every 4 (Four)  Hours As Needed for Wheezing., Disp: 18 g, Rfl: 5    amLODIPine (NORVASC) 5 MG tablet, Take 1 tablet by mouth Daily., Disp: 90 tablet, Rfl: 3    aspirin 81 MG EC tablet, Take 1 tablet by mouth 2 (Two) Times a Day., Disp: , Rfl:     atorvastatin (LIPITOR) 40 MG tablet, Take 0.5 tablets by mouth Daily., Disp: , Rfl:     celecoxib (CeleBREX) 200 MG capsule, Take 1 capsule by mouth Daily., Disp: 90 capsule, Rfl: 3    Fluticasone-Salmeterol (ADVAIR/WIXELA) 250-50 MCG/ACT DISKUS, Inhale 1 puff 2 (Two) Times a Day., Disp: , Rfl:     gabapentin (NEURONTIN) 400 MG capsule, Take 1 capsule by mouth 3 (Three) Times a Day., Disp: , Rfl:     HYDROcodone-acetaminophen (NORCO)  MG per tablet, Take 1 tablet by mouth 3 (Three) Times a Day., Disp: , Rfl:     losartan (COZAAR) 100 MG tablet, Take 1 tablet by mouth Daily., Disp: 90 tablet, Rfl: 3    metoprolol tartrate (LOPRESSOR) 25 MG tablet, Take 1 tablet by mouth 2 (Two) Times a Day., Disp: 60 tablet, Rfl: 11    pantoprazole (PROTONIX) 40 MG EC tablet, Take 1 tablet by mouth 2 (Two) Times a Day., Disp: , Rfl:     ranolazine (Ranexa) 1000 MG 12 hr tablet, Take 1 tablet by mouth 2 (Two) Times a Day., Disp: 60 tablet, Rfl: 3    sulfamethoxazole-trimethoprim (Bactrim DS) 800-160 MG per tablet, Take 1 tablet by mouth 2 (Two) Times a Day., Disp: 20 tablet, Rfl: 0    tiotropium bromide monohydrate (SPIRIVA RESPIMAT) 2.5 MCG/ACT aerosol solution inhaler, Inhale 1 puff Daily., Disp: 1 inhaler, Rfl: 5    The following portions of the patient's history were reviewed and updated as appropriate: allergies, current medications, past family history, past medical history, past social history, past surgical history and problem list.    Social History     Tobacco Use    Smoking status: Every Day     Packs/day: 1.00     Years: 15.00     Additional pack years: 0.00     Total pack years: 15.00     Types: Cigarettes     Passive exposure: Current    Smokeless tobacco: Never    Tobacco comments:      "had quit but started back today 10/23/18   Vaping Use    Vaping Use: Never used   Substance Use Topics    Alcohol use: No    Drug use: No         Objective   Vitals:    01/25/24 1331   BP: 151/86   Pulse: 80   Resp: 16   SpO2: 98%   Weight: 96.2 kg (212 lb)   Height: 180.3 cm (71\")     Body mass index is 29.57 kg/m².    ROS    Constitutional:       General: Not in acute distress.     Appearance: Healthy appearance. Well-developed and not in distress. Not diaphoretic.   Eyes:      Conjunctiva/sclera: Conjunctivae normal.      Pupils: Pupils are equal, round, and reactive to light.   HENT:      Head: Normocephalic and atraumatic.   Neck:      Vascular: No carotid bruit or JVD.   Pulmonary:      Effort: Pulmonary effort is normal. No respiratory distress.      Breath sounds: Normal breath sounds.   Cardiovascular:      Normal rate. Regular rhythm.   Edema:     Peripheral edema absent.   Skin:     General: Skin is cool.   Neurological:      Mental Status: Alert, oriented to person, place, and time and oriented to person, place and time.         Lab Results   Component Value Date     12/01/2022    K 3.8 12/01/2022     12/01/2022    CO2 21.2 (L) 12/01/2022    BUN 12 12/01/2022    CREATININE 1.10 01/04/2023    GLUCOSE 84 12/01/2022    CALCIUM 8.3 (L) 12/01/2022    AST 10 12/01/2022    ALT 12 12/01/2022    ALKPHOS 96 12/01/2022    LABIL2 1.7 10/17/2022     Lab Results   Component Value Date    CKTOTAL 103 10/17/2022     Lab Results   Component Value Date    WBC 6.80 12/01/2022    HGB 14.2 12/01/2022    HCT 42.3 12/01/2022     12/01/2022     Lab Results   Component Value Date    INR 1.14 (H) 08/30/2018     Lab Results   Component Value Date    MG 2.0 11/30/2022     Lab Results   Component Value Date    TSH 1.610 10/18/2022    CHLPL 116 10/10/2022    TRIG 126 11/30/2022    HDL 30 (L) 11/30/2022    LDL 55 11/30/2022      Lab Results   Component Value Date    BNP 43.0 09/07/2018       During this visit the " following were done:  Labs Reviewed []    Labs Ordered []    Radiology Reports Reviewed []    Radiology Ordered []    PCP Records Reviewed []    Referring Provider Records Reviewed []    ER Records Reviewed []    Hospital Records Reviewed []    History Obtained From Family []    Radiology Images Reviewed []    Other Reviewed []    Records Requested []       Procedures    Assessment & Plan    Diagnosis Plan   1. ASCVD with 50% stenosis in RCA in 2013  Comprehensive Metabolic Panel    Lipid Panel    CBC (No Diff)      2. Essential hypertension        3. Mixed hyperlipidemia                 Recommendations:  Abdominal pain  Atypical for angina, he admits the symptoms are similar to the symptoms he had in the past with pancreatitis. I encouraged him to speak with pcp for this. I advised him that if symptoms become more persistent or recurrent to contact our office.   ASCVD   I will continue aspirin, lipitor, losartan, lopressor, and ranexa.   Essential hypertension  Will continue current regimen. He reports blood pressure is controlled at home.     No follow-ups on file.    As always, I appreciate very much the opportunity to participate in the cardiovascular care of your patients.      With Best Regards,    Danny Oneal PA-C

## 2024-01-29 ENCOUNTER — OFFICE VISIT (OUTPATIENT)
Dept: PULMONOLOGY | Facility: CLINIC | Age: 74
End: 2024-01-29
Payer: OTHER GOVERNMENT

## 2024-01-29 VITALS
SYSTOLIC BLOOD PRESSURE: 140 MMHG | TEMPERATURE: 98.1 F | HEART RATE: 84 BPM | HEIGHT: 71 IN | DIASTOLIC BLOOD PRESSURE: 86 MMHG | BODY MASS INDEX: 29.68 KG/M2 | WEIGHT: 212 LBS | OXYGEN SATURATION: 92 %

## 2024-01-29 DIAGNOSIS — R91.8 MULTIPLE PULMONARY NODULES: ICD-10-CM

## 2024-01-29 DIAGNOSIS — E66.3 OVERWEIGHT: ICD-10-CM

## 2024-01-29 DIAGNOSIS — F17.211 CIGARETTE NICOTINE DEPENDENCE IN REMISSION: ICD-10-CM

## 2024-01-29 DIAGNOSIS — Z12.2 ENCOUNTER FOR SCREENING FOR LUNG CANCER: ICD-10-CM

## 2024-01-29 DIAGNOSIS — R05.1 ACUTE COUGH: ICD-10-CM

## 2024-01-29 DIAGNOSIS — J44.9 CHRONIC OBSTRUCTIVE PULMONARY DISEASE, UNSPECIFIED COPD TYPE: Primary | ICD-10-CM

## 2024-01-29 PROCEDURE — 94618 PULMONARY STRESS TESTING: CPT | Performed by: NURSE PRACTITIONER

## 2024-01-29 PROCEDURE — 99214 OFFICE O/P EST MOD 30 MIN: CPT | Performed by: NURSE PRACTITIONER

## 2024-01-29 RX ORDER — FLUTICASONE PROPIONATE 220 UG/1
2 AEROSOL, METERED RESPIRATORY (INHALATION)
Qty: 12 G | Refills: 11 | Status: SHIPPED | OUTPATIENT
Start: 2024-01-29

## 2024-01-29 RX ORDER — CODEINE PHOSPHATE/GUAIFENESIN 10-100MG/5
5 LIQUID (ML) ORAL NIGHTLY PRN
Qty: 237 ML | Refills: 0 | Status: SHIPPED | OUTPATIENT
Start: 2024-01-29

## 2024-01-29 NOTE — PROGRESS NOTES
"Chief Complaint  COPD mixed type    Subjective        Bryson Bakre presents to [facility]  History of Present Illness    Mr. Baker is a 73 year old male with a medical history significant for hypertension, hyperlipidemia, GERD, and COPD.    He presents today for follow up on COPD.  He states that he feels that his breathing has been getting much worse.  He reports increased shortness of breath with exertion.  He is taking Wixela BID and spiriva once daily.  He states that these inhalers don't seem to be helping much anymore. He also complains of a cough that has started in the last few weeks that is keeping him awake at night.        Objective   Vital Signs:  /86   Pulse 84   Temp 98.1 °F (36.7 °C)   Ht 180.3 cm (70.98\")   Wt 96.2 kg (212 lb)   SpO2 92%   BMI 29.58 kg/m²   Estimated body mass index is 29.58 kg/m² as calculated from the following:    Height as of this encounter: 180.3 cm (70.98\").    Weight as of this encounter: 96.2 kg (212 lb).               Physical Exam     GENERAL APPEARANCE: Well developed, well nourished, alert and cooperative, and appears to be in no acute distress.    HEAD: normocephalic. Atraumatic.    EYES: PERRL, EOMI. Vision is grossly intact.    THROAT: Oral cavity and pharynx normal. No inflammation, swelling, exudate, or lesions.     NECK: Neck supple.  No thyromegaly.    CARDIAC: Normal S1 and S2. No S3, S4 or murmurs. Rhythm is regular.     RESPIRATORY:Bilateral air entry positive. Bilateral diminished breath sounds. No wheezing, crackles or rhonchi noted.    GI: Positive bowel sounds. Soft, nondistended, nontender.     MUSCULOSKELETAL: No significant deformity or joint abnormality. No edema. Peripheral pulses intact. No varicosities.    NEUROLOGICAL: Strength and sensation symmetric and intact throughout.     PSYCHIATRIC: The mental examination revealed the patient was oriented to person, place, and time. "     Result Review :    The following data was reviewed by: ANGELIC Michaels on 01/29/2024:             Assessment and Plan     Diagnoses and all orders for this visit:    1. Chronic obstructive pulmonary disease, unspecified COPD type (Primary)  -     Oxygen Therapy    2. Acute cough  -     guaiFENesin-codeine (GUAIFENESIN AC) 100-10 MG/5ML liquid; Take 5 mL by mouth At Night As Needed for Cough.  Dispense: 237 mL; Refill: 0    3. Overweight    4. Multiple pulmonary nodules    5. Cigarette nicotine dependence in remission  -     CT Chest Low Dose Wo; Future    6. Encounter for screening for lung cancer  -     CT Chest Low Dose Wo; Future    Other orders  -     tiotropium bromide-olodaterol (STIOLTO RESPIMAT) 2.5-2.5 MCG/ACT aerosol solution inhaler; Inhale 2 puffs Daily.  Dispense: 4 g; Refill: 5  -     fluticasone (FLOVENT HFA) 220 MCG/ACT inhaler; Inhale 2 puffs 2 (Two) Times a Day.  Dispense: 12 g; Refill: 11          CT chest was completed in June 2023.  No change in bilateral pulmonary nodules.  We will plan to repeat CT chest in June 2024.    Will change inhalers to stiolto once daily and Flovent BID.  Continue albuterol inhaler and neb treatments as needed.    He has been unable to tolerate cpap in the past.    6 MWT was completed in office.  Oxygen saturation was noted to drop to 88% during ambulation.    Placed an order for supplemental oxygen at night and as needed with ambulation.     Sent in cough syrup for acute cough.      Follow Up     Return in about 6 months (around 7/29/2024).  Patient was given instructions and counseling regarding his condition or for health maintenance advice. Please see specific information pulled into the AVS if appropriate.

## 2024-03-18 ENCOUNTER — OFFICE VISIT (OUTPATIENT)
Dept: FAMILY MEDICINE CLINIC | Facility: CLINIC | Age: 74
End: 2024-03-18
Payer: OTHER GOVERNMENT

## 2024-03-18 VITALS
WEIGHT: 211 LBS | OXYGEN SATURATION: 99 % | SYSTOLIC BLOOD PRESSURE: 130 MMHG | TEMPERATURE: 97.4 F | HEIGHT: 71 IN | DIASTOLIC BLOOD PRESSURE: 80 MMHG | BODY MASS INDEX: 29.54 KG/M2 | HEART RATE: 65 BPM

## 2024-03-18 DIAGNOSIS — J30.1 SEASONAL ALLERGIC RHINITIS DUE TO POLLEN: Chronic | ICD-10-CM

## 2024-03-18 DIAGNOSIS — E78.2 MIXED HYPERLIPIDEMIA: Chronic | ICD-10-CM

## 2024-03-18 DIAGNOSIS — I25.10 ASCVD (ARTERIOSCLEROTIC CARDIOVASCULAR DISEASE): Chronic | ICD-10-CM

## 2024-03-18 DIAGNOSIS — I10 ESSENTIAL HYPERTENSION: Primary | Chronic | ICD-10-CM

## 2024-03-18 DIAGNOSIS — M47.25 OSTEOARTHRITIS OF SPINE WITH RADICULOPATHY, THORACOLUMBAR REGION: Chronic | ICD-10-CM

## 2024-03-18 PROCEDURE — 80061 LIPID PANEL: CPT | Performed by: PHYSICIAN ASSISTANT

## 2024-03-18 PROCEDURE — 36415 COLL VENOUS BLD VENIPUNCTURE: CPT | Performed by: PHYSICIAN ASSISTANT

## 2024-03-18 PROCEDURE — 99214 OFFICE O/P EST MOD 30 MIN: CPT | Performed by: PHYSICIAN ASSISTANT

## 2024-03-18 PROCEDURE — 80053 COMPREHEN METABOLIC PANEL: CPT | Performed by: PHYSICIAN ASSISTANT

## 2024-03-18 PROCEDURE — 85027 COMPLETE CBC AUTOMATED: CPT | Performed by: PHYSICIAN ASSISTANT

## 2024-03-18 RX ORDER — CELECOXIB 200 MG/1
200 CAPSULE ORAL DAILY
Qty: 90 CAPSULE | Refills: 3 | Status: SHIPPED | OUTPATIENT
Start: 2024-03-18

## 2024-03-18 RX ORDER — FLUTICASONE PROPIONATE 220 UG/1
2 AEROSOL, METERED RESPIRATORY (INHALATION)
Qty: 36 G | Refills: 3 | Status: SHIPPED | OUTPATIENT
Start: 2024-03-18

## 2024-03-18 NOTE — PROGRESS NOTES
"Chief Complaint -hypertension    History of Present Illness -     Bryson Baker is a 73 y.o. male.     Hypertension-  Controlled with amlodipine, metoprolol and losartan    Osteoarthritis-  Stable with Celebrex    Allergic rhinitis-  Stable with Flonase    Cardiovascular disease-  Stable with medical management.  He is noted to have 50% stenosis in RCA in 2013.  Patient currently on risk factor modification including aspirin, atorvastatin, and Ranexa.  He denies any chest pain shortness of breath or near syncope today    Hyperlipidemia-  Stable with atorvastatin and low-cholesterol diet      The following portions of the patient's history were reviewed and updated as appropriate: allergies, current medications, past family history, past medical history, past social history, past surgical history and problem list.        Objective  Vital signs:  /80 (BP Location: Left arm, Patient Position: Sitting, Cuff Size: Adult)   Pulse 65   Temp 97.4 °F (36.3 °C) (Temporal)   Ht 180.3 cm (70.98\")   Wt 95.7 kg (211 lb)   SpO2 99%   BMI 29.44 kg/m²     Physical Exam  Vitals and nursing note reviewed.   Constitutional:       Appearance: Normal appearance. He is well-developed.   Eyes:      Extraocular Movements: Extraocular movements intact.      Conjunctiva/sclera: Conjunctivae normal.   Cardiovascular:      Rate and Rhythm: Normal rate and regular rhythm.      Heart sounds: Normal heart sounds. No murmur heard.  Pulmonary:      Effort: Pulmonary effort is normal. No respiratory distress.      Breath sounds: Normal breath sounds. No wheezing.   Musculoskeletal:         General: No tenderness.   Skin:     General: Skin is warm and dry.      Findings: No rash.   Neurological:      Mental Status: He is alert and oriented to person, place, and time.   Psychiatric:         Mood and Affect: Mood normal.         Behavior: Behavior normal.         Thought Content: Thought content normal.         The following data was " reviewed by Rita Orr PA-C:         Lab Results   Component Value Date    BUN 12 12/01/2022    CREATININE 1.10 01/04/2023    EGFR 91.6 12/01/2022    ALT 12 12/01/2022    AST 10 12/01/2022    WBC 6.80 12/01/2022    HGB 14.2 12/01/2022    HCT 42.3 12/01/2022     12/01/2022    CHOL 108 11/30/2022    TRIG 126 11/30/2022    HDL 30 (L) 11/30/2022    LDL 55 11/30/2022    TSH 1.610 10/18/2022    HGBA1C 5.40 10/18/2022           Assessment & Plan     Diagnoses and all orders for this visit:    1. Essential hypertension (Primary)  Comments:  Continue amlodipine, metoprolol and losartan  Advise daily BP and pulse log    2. Osteoarthritis of spine with radiculopathy, thoracolumbar region  Comments:  Continue Celebrex  Advised conservative measures and daily stretching  Orders:  -     celecoxib (CeleBREX) 200 MG capsule; Take 1 capsule by mouth Daily.  Dispense: 90 capsule; Refill: 3    3. Seasonal allergic rhinitis due to pollen  Comments:  Continue Flonase  Advised to avoid known environmental allergens when possible  Orders:  -     fluticasone (FLOVENT HFA) 220 MCG/ACT inhaler; Inhale 2 puffs 2 (Two) Times a Day.  Dispense: 36 g; Refill: 3    4. ASCVD with 50% stenosis in RCA in 2013  Comments:  Continue risk factor reduction with aspirin, atorvastatin and Ranexa  Orders:  -     Comprehensive Metabolic Panel  -     Lipid Panel  -     CBC (No Diff)    5. Mixed hyperlipidemia  Comments:  Continue atorvastatin  Advised low-cholesterol diet        BMI is >= 25 and <30. (Overweight) The following options were offered after discussion;: exercise counseling/recommendations and nutrition counseling/recommendations          Patient was given instructions and counseling regarding his condition or for health maintenance advice. Please see specific information pulled into the AVS if appropriate      This document has been electronically signed by:  Rita Orr PA-C

## 2024-03-19 LAB
ALBUMIN SERPL-MCNC: 3.8 G/DL (ref 3.5–5.2)
ALBUMIN/GLOB SERPL: 1.7 G/DL
ALP SERPL-CCNC: 103 U/L (ref 39–117)
ALT SERPL W P-5'-P-CCNC: 17 U/L (ref 1–41)
ANION GAP SERPL CALCULATED.3IONS-SCNC: 9 MMOL/L (ref 5–15)
AST SERPL-CCNC: 14 U/L (ref 1–40)
BILIRUB SERPL-MCNC: 0.7 MG/DL (ref 0–1.2)
BUN SERPL-MCNC: 11 MG/DL (ref 8–23)
BUN/CREAT SERPL: 10.7 (ref 7–25)
CALCIUM SPEC-SCNC: 8.8 MG/DL (ref 8.6–10.5)
CHLORIDE SERPL-SCNC: 99 MMOL/L (ref 98–107)
CHOLEST SERPL-MCNC: 117 MG/DL (ref 0–200)
CO2 SERPL-SCNC: 25 MMOL/L (ref 22–29)
CREAT SERPL-MCNC: 1.03 MG/DL (ref 0.76–1.27)
DEPRECATED RDW RBC AUTO: 42.8 FL (ref 37–54)
EGFRCR SERPLBLD CKD-EPI 2021: 77.2 ML/MIN/1.73
ERYTHROCYTE [DISTWIDTH] IN BLOOD BY AUTOMATED COUNT: 13.2 % (ref 12.3–15.4)
GLOBULIN UR ELPH-MCNC: 2.2 GM/DL
GLUCOSE SERPL-MCNC: 91 MG/DL (ref 65–99)
HCT VFR BLD AUTO: 40.1 % (ref 37.5–51)
HDLC SERPL-MCNC: 40 MG/DL (ref 40–60)
HGB BLD-MCNC: 13.7 G/DL (ref 13–17.7)
LDLC SERPL CALC-MCNC: 61 MG/DL (ref 0–100)
LDLC/HDLC SERPL: 1.54 {RATIO}
MCH RBC QN AUTO: 30.4 PG (ref 26.6–33)
MCHC RBC AUTO-ENTMCNC: 34.2 G/DL (ref 31.5–35.7)
MCV RBC AUTO: 88.9 FL (ref 79–97)
PLATELET # BLD AUTO: 314 10*3/MM3 (ref 140–450)
PMV BLD AUTO: 9.5 FL (ref 6–12)
POTASSIUM SERPL-SCNC: 4.5 MMOL/L (ref 3.5–5.2)
PROT SERPL-MCNC: 6 G/DL (ref 6–8.5)
RBC # BLD AUTO: 4.51 10*6/MM3 (ref 4.14–5.8)
SODIUM SERPL-SCNC: 133 MMOL/L (ref 136–145)
TRIGL SERPL-MCNC: 78 MG/DL (ref 0–150)
VLDLC SERPL-MCNC: 16 MG/DL (ref 5–40)
WBC NRBC COR # BLD AUTO: 8.58 10*3/MM3 (ref 3.4–10.8)

## 2024-03-20 RX ORDER — RANOLAZINE 1000 MG/1
1000 TABLET, EXTENDED RELEASE ORAL 2 TIMES DAILY
Qty: 60 TABLET | Refills: 3 | Status: SHIPPED | OUTPATIENT
Start: 2024-03-20

## 2024-07-23 ENCOUNTER — OFFICE VISIT (OUTPATIENT)
Dept: CARDIOLOGY | Facility: CLINIC | Age: 74
End: 2024-07-23
Payer: OTHER GOVERNMENT

## 2024-07-23 VITALS
SYSTOLIC BLOOD PRESSURE: 127 MMHG | BODY MASS INDEX: 29.46 KG/M2 | OXYGEN SATURATION: 94 % | HEART RATE: 57 BPM | DIASTOLIC BLOOD PRESSURE: 77 MMHG | HEIGHT: 71 IN | WEIGHT: 210.4 LBS

## 2024-07-23 DIAGNOSIS — I25.10 ASCVD (ARTERIOSCLEROTIC CARDIOVASCULAR DISEASE): ICD-10-CM

## 2024-07-23 DIAGNOSIS — I10 ESSENTIAL HYPERTENSION: Primary | ICD-10-CM

## 2024-07-23 DIAGNOSIS — E78.2 MIXED HYPERLIPIDEMIA: ICD-10-CM

## 2024-07-23 PROCEDURE — 99214 OFFICE O/P EST MOD 30 MIN: CPT | Performed by: PHYSICIAN ASSISTANT

## 2024-07-23 PROCEDURE — 93000 ELECTROCARDIOGRAM COMPLETE: CPT | Performed by: PHYSICIAN ASSISTANT

## 2024-07-23 NOTE — PROGRESS NOTES
Rita Orr PA  Bryson Baker  1950 07/23/2024    Patient Active Problem List   Diagnosis    Essential hypertension    Mixed hyperlipidemia    ASCVD with 50% stenosis in RCA in 2013    PVD (peripheral vascular disease)    Precordial chest pain    Tobacco abuse    Palpitations    Emphysema    Multiple pulmonary nodules    GAYTAN (dyspnea on exertion)    History of colon polyps    Diastasis recti    Umbilical hernia without obstruction and without gangrene    Gastroesophageal reflux disease without esophagitis    Acute pancreatitis without infection or necrosis, unspecified pancreatitis type    Fatty liver    Lumbar degenerative disc disease    Left inguinal hernia    Abnormal CT of the abdomen    Obesity due to excess calories    Diverticulitis       Dear Rita Orr PA:    Subjective     Follow-up  :    Chief Complaint   Patient presents with    Follow-up     ROUTINE       Bryson Baker is a pleasant 73 y.o. male with a past medical history significant for nonobstructive CAD with 50% stenosis seen in the RCA in 2014, tobacco abuse, essential hypertension.  He comes in today for cardiology follow-up     Mr. Baker reports he has been doing well has no open complaints Patient denies any chest pain, shortness of breath, palpitations, dizziness, syncope or near syncope. Blood pressure has been well controlled as well.     No Known Allergies:      Current Outpatient Medications:     albuterol sulfate  (90 Base) MCG/ACT inhaler, Inhale 2 puffs Every 4 (Four) Hours As Needed for Wheezing., Disp: 18 g, Rfl: 5    amLODIPine (NORVASC) 5 MG tablet, Take 1 tablet by mouth Daily., Disp: 90 tablet, Rfl: 3    aspirin 81 MG EC tablet, Take 1 tablet by mouth 2 (Two) Times a Day., Disp: , Rfl:     atorvastatin (LIPITOR) 40 MG tablet, Take 0.5 tablets by mouth Daily., Disp: , Rfl:     celecoxib (CeleBREX) 200 MG capsule, Take 1 capsule by mouth Daily., Disp: 90 capsule, Rfl: 3    fluticasone (FLOVENT HFA)  "220 MCG/ACT inhaler, Inhale 2 puffs 2 (Two) Times a Day., Disp: 36 g, Rfl: 3    gabapentin (NEURONTIN) 400 MG capsule, Take 1 capsule by mouth 3 (Three) Times a Day., Disp: , Rfl:     HYDROcodone-acetaminophen (NORCO)  MG per tablet, Take 1 tablet by mouth 3 (Three) Times a Day., Disp: , Rfl:     losartan (COZAAR) 100 MG tablet, Take 1 tablet by mouth Daily., Disp: 90 tablet, Rfl: 3    metoprolol tartrate (LOPRESSOR) 25 MG tablet, Take 1 tablet by mouth 2 (Two) Times a Day., Disp: 60 tablet, Rfl: 11    pantoprazole (PROTONIX) 40 MG EC tablet, Take 1 tablet by mouth 2 (Two) Times a Day., Disp: , Rfl:     ranolazine (Ranexa) 1000 MG 12 hr tablet, Take 1 tablet by mouth 2 (Two) Times a Day., Disp: 60 tablet, Rfl: 3    tiotropium bromide monohydrate (SPIRIVA RESPIMAT) 2.5 MCG/ACT aerosol solution inhaler, Inhale 1 puff Daily., Disp: 1 inhaler, Rfl: 5    tiotropium bromide-olodaterol (STIOLTO RESPIMAT) 2.5-2.5 MCG/ACT aerosol solution inhaler, Inhale 2 puffs Daily., Disp: 4 g, Rfl: 5    The following portions of the patient's history were reviewed and updated as appropriate: allergies, current medications, past family history, past medical history, past social history, past surgical history and problem list.    Social History     Tobacco Use    Smoking status: Every Day     Current packs/day: 1.00     Average packs/day: 1 pack/day for 15.0 years (15.0 ttl pk-yrs)     Types: Cigarettes     Passive exposure: Current    Smokeless tobacco: Never    Tobacco comments:     had quit but started back today 10/23/18   Vaping Use    Vaping status: Never Used   Substance Use Topics    Alcohol use: No    Drug use: No         Objective   Vitals:    07/23/24 1256   BP: 127/77   Pulse: 57   SpO2: 94%   Weight: 95.4 kg (210 lb 6.4 oz)   Height: 180.3 cm (71\")     Body mass index is 29.34 kg/m².    ROS    Constitutional:       General: Not in acute distress.     Appearance: Healthy appearance. Well-developed and not in distress. Not " diaphoretic.   Eyes:      Conjunctiva/sclera: Conjunctivae normal.      Pupils: Pupils are equal, round, and reactive to light.   HENT:      Head: Normocephalic and atraumatic.   Neck:      Vascular: No carotid bruit or JVD.   Pulmonary:      Effort: Pulmonary effort is normal. No respiratory distress.      Breath sounds: Normal breath sounds.   Cardiovascular:      Normal rate. Regular rhythm.   Edema:     Peripheral edema absent.   Skin:     General: Skin is cool.   Neurological:      Mental Status: Alert, oriented to person, place, and time and oriented to person, place and time.         Lab Results   Component Value Date     (L) 03/18/2024    K 4.5 03/18/2024    CL 99 03/18/2024    CO2 25.0 03/18/2024    BUN 11 03/18/2024    CREATININE 1.03 03/18/2024    GLUCOSE 91 03/18/2024    CALCIUM 8.8 03/18/2024    AST 14 03/18/2024    ALT 17 03/18/2024    ALKPHOS 103 03/18/2024    LABIL2 1.7 10/17/2022     Lab Results   Component Value Date    CKTOTAL 103 10/17/2022     Lab Results   Component Value Date    WBC 8.58 03/18/2024    HGB 13.7 03/18/2024    HCT 40.1 03/18/2024     03/18/2024     Lab Results   Component Value Date    INR 1.14 (H) 08/30/2018     Lab Results   Component Value Date    MG 2.0 11/30/2022     Lab Results   Component Value Date    TSH 1.610 10/18/2022    CHLPL 116 10/10/2022    TRIG 78 03/18/2024    HDL 40 03/18/2024    LDL 61 03/18/2024      Lab Results   Component Value Date    BNP 43.0 09/07/2018       During this visit the following were done:  Labs Reviewed []    Labs Ordered []    Radiology Reports Reviewed []    Radiology Ordered []    PCP Records Reviewed []    Referring Provider Records Reviewed []    ER Records Reviewed []    Hospital Records Reviewed []    History Obtained From Family []    Radiology Images Reviewed []    Other Reviewed []    Records Requested []         ECG 12 Lead    Date/Time: 7/24/2024 4:56 PM  Performed by: Danny Oneal PA-C    Authorized by: Jm  Danny LZAAR PA-C  Comparison: compared with previous ECG   Similar to previous ECG  Rhythm: sinus rhythm    Clinical impression: normal ECG  Comments: Normal sinus rhythm rate of 55 bpm QTc 440 QRS 92          Assessment & Plan    Diagnosis Plan   1. Essential hypertension        2. ASCVD with 50% stenosis in RCA in 2013        3. Mixed hyperlipidemia                 Recommendations:  ASCVD  Denies any anginal symptoms continue aspirin, Lipitor, metoprolol, losartan, Ranexa.  Last lipid panel showed adequately controlled LDL of 61  Essential hypertension  Well-controlled.    No follow-ups on file.    As always, I appreciate very much the opportunity to participate in the cardiovascular care of your patients.      With Best Regards,    Danny Oneal PA-C

## 2024-07-24 PROCEDURE — 93000 ELECTROCARDIOGRAM COMPLETE: CPT | Performed by: PHYSICIAN ASSISTANT

## 2024-09-12 ENCOUNTER — OFFICE VISIT (OUTPATIENT)
Dept: SURGERY | Facility: CLINIC | Age: 74
End: 2024-09-12
Payer: OTHER GOVERNMENT

## 2024-09-12 VITALS
SYSTOLIC BLOOD PRESSURE: 132 MMHG | HEIGHT: 71 IN | BODY MASS INDEX: 28.28 KG/M2 | DIASTOLIC BLOOD PRESSURE: 64 MMHG | WEIGHT: 202 LBS

## 2024-09-12 DIAGNOSIS — K40.90 LEFT INGUINAL HERNIA: Primary | ICD-10-CM

## 2024-09-12 RX ORDER — SODIUM CHLORIDE 0.9 % (FLUSH) 0.9 %
10 SYRINGE (ML) INJECTION AS NEEDED
OUTPATIENT
Start: 2024-09-12

## 2024-09-12 RX ORDER — SODIUM CHLORIDE 0.9 % (FLUSH) 0.9 %
3 SYRINGE (ML) INJECTION EVERY 12 HOURS SCHEDULED
OUTPATIENT
Start: 2024-09-12

## 2024-09-12 RX ORDER — SODIUM CHLORIDE 9 MG/ML
40 INJECTION, SOLUTION INTRAVENOUS AS NEEDED
OUTPATIENT
Start: 2024-09-12
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
## 2024-09-19 ENCOUNTER — OFFICE VISIT (OUTPATIENT)
Dept: FAMILY MEDICINE CLINIC | Facility: CLINIC | Age: 74
End: 2024-09-19
Payer: OTHER GOVERNMENT

## 2024-09-19 VITALS
HEART RATE: 82 BPM | TEMPERATURE: 97.9 F | DIASTOLIC BLOOD PRESSURE: 60 MMHG | SYSTOLIC BLOOD PRESSURE: 138 MMHG | HEIGHT: 71 IN | RESPIRATION RATE: 14 BRPM | OXYGEN SATURATION: 97 % | BODY MASS INDEX: 28.14 KG/M2 | WEIGHT: 201 LBS

## 2024-09-19 DIAGNOSIS — N52.9 ERECTILE DYSFUNCTION, UNSPECIFIED ERECTILE DYSFUNCTION TYPE: ICD-10-CM

## 2024-09-19 DIAGNOSIS — J44.9 COPD MIXED TYPE: ICD-10-CM

## 2024-09-19 DIAGNOSIS — I10 ESSENTIAL HYPERTENSION: Chronic | ICD-10-CM

## 2024-09-19 DIAGNOSIS — I10 ESSENTIAL HYPERTENSION: Primary | Chronic | ICD-10-CM

## 2024-09-19 DIAGNOSIS — E78.2 MIXED HYPERLIPIDEMIA: Chronic | ICD-10-CM

## 2024-09-19 PROBLEM — K86.1 CHRONIC PANCREATITIS: Status: ACTIVE | Noted: 2024-09-19

## 2024-09-19 PROCEDURE — 90471 IMMUNIZATION ADMIN: CPT | Performed by: PHYSICIAN ASSISTANT

## 2024-09-19 PROCEDURE — 99214 OFFICE O/P EST MOD 30 MIN: CPT | Performed by: PHYSICIAN ASSISTANT

## 2024-09-19 PROCEDURE — 90662 IIV NO PRSV INCREASED AG IM: CPT | Performed by: PHYSICIAN ASSISTANT

## 2024-09-19 RX ORDER — FAMOTIDINE 20 MG/1
20 TABLET, FILM COATED ORAL
COMMUNITY
Start: 2024-09-09

## 2024-09-19 RX ORDER — SILDENAFIL 100 MG/1
100 TABLET, FILM COATED ORAL DAILY PRN
Start: 2024-09-19

## 2024-09-19 RX ORDER — AMLODIPINE BESYLATE 5 MG/1
5 TABLET ORAL DAILY
Qty: 90 TABLET | Refills: 3 | Status: SHIPPED | OUTPATIENT
Start: 2024-09-19

## 2024-09-19 RX ORDER — LOSARTAN POTASSIUM 100 MG/1
100 TABLET ORAL DAILY
Qty: 90 TABLET | Refills: 3 | Status: SHIPPED | OUTPATIENT
Start: 2024-09-19

## 2024-09-19 RX ORDER — ALBUTEROL SULFATE 90 UG/1
2 AEROSOL, METERED RESPIRATORY (INHALATION) EVERY 4 HOURS PRN
Qty: 54 G | Refills: 3 | Status: SHIPPED | OUTPATIENT
Start: 2024-09-19

## 2024-10-02 ENCOUNTER — TELEPHONE (OUTPATIENT)
Dept: SURGERY | Facility: CLINIC | Age: 74
End: 2024-10-02
Payer: OTHER GOVERNMENT

## 2024-10-02 NOTE — TELEPHONE ENCOUNTER
Patient cancelled LIH repair for now.  Has new pancreatic diagnosis as of 2 weeks ago.  He will call back when able to reschedule

## 2024-12-10 RX ORDER — RANOLAZINE 1000 MG/1
1000 TABLET, EXTENDED RELEASE ORAL 2 TIMES DAILY
Qty: 60 TABLET | Refills: 3 | Status: SHIPPED | OUTPATIENT
Start: 2024-12-10

## 2024-12-19 ENCOUNTER — OFFICE VISIT (OUTPATIENT)
Dept: SURGERY | Facility: CLINIC | Age: 74
End: 2024-12-19
Payer: OTHER GOVERNMENT

## 2024-12-19 VITALS
SYSTOLIC BLOOD PRESSURE: 140 MMHG | DIASTOLIC BLOOD PRESSURE: 82 MMHG | HEIGHT: 71 IN | BODY MASS INDEX: 28.28 KG/M2 | WEIGHT: 202 LBS

## 2024-12-19 DIAGNOSIS — K40.90 LEFT INGUINAL HERNIA: Primary | ICD-10-CM

## 2024-12-19 PROCEDURE — 99213 OFFICE O/P EST LOW 20 MIN: CPT | Performed by: SURGERY
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
## 2025-01-14 ENCOUNTER — APPOINTMENT (OUTPATIENT)
Dept: GENERAL RADIOLOGY | Facility: HOSPITAL | Age: 75
End: 2025-01-14
Payer: OTHER GOVERNMENT

## 2025-01-14 ENCOUNTER — APPOINTMENT (OUTPATIENT)
Dept: CT IMAGING | Facility: HOSPITAL | Age: 75
End: 2025-01-14
Payer: OTHER GOVERNMENT

## 2025-01-14 ENCOUNTER — HOSPITAL ENCOUNTER (EMERGENCY)
Facility: HOSPITAL | Age: 75
Discharge: HOME | End: 2025-01-14
Attending: STUDENT IN AN ORGANIZED HEALTH CARE EDUCATION/TRAINING PROGRAM | Admitting: STUDENT IN AN ORGANIZED HEALTH CARE EDUCATION/TRAINING PROGRAM
Payer: OTHER GOVERNMENT

## 2025-01-14 VITALS
HEART RATE: 53 BPM | SYSTOLIC BLOOD PRESSURE: 150 MMHG | RESPIRATION RATE: 20 BRPM | WEIGHT: 200 LBS | HEIGHT: 71 IN | BODY MASS INDEX: 28 KG/M2 | DIASTOLIC BLOOD PRESSURE: 91 MMHG | OXYGEN SATURATION: 92 % | TEMPERATURE: 97.6 F

## 2025-01-14 DIAGNOSIS — K85.80 OTHER ACUTE PANCREATITIS WITHOUT INFECTION OR NECROSIS: Primary | ICD-10-CM

## 2025-01-14 LAB
ALBUMIN SERPL-MCNC: 3.5 G/DL (ref 3.5–5.2)
ALBUMIN/GLOB SERPL: 1.2 G/DL
ALP SERPL-CCNC: 120 U/L (ref 39–117)
ALT SERPL W P-5'-P-CCNC: 10 U/L (ref 1–41)
ANION GAP SERPL CALCULATED.3IONS-SCNC: 9.5 MMOL/L (ref 5–15)
AST SERPL-CCNC: 9 U/L (ref 1–40)
BASOPHILS # BLD AUTO: 0.08 10*3/MM3 (ref 0–0.2)
BASOPHILS NFR BLD AUTO: 1.1 % (ref 0–1.5)
BILIRUB SERPL-MCNC: 0.5 MG/DL (ref 0–1.2)
BUN SERPL-MCNC: 19 MG/DL (ref 8–23)
BUN/CREAT SERPL: 24.1 (ref 7–25)
CALCIUM SPEC-SCNC: 8.7 MG/DL (ref 8.6–10.5)
CHLORIDE SERPL-SCNC: 103 MMOL/L (ref 98–107)
CO2 SERPL-SCNC: 22.5 MMOL/L (ref 22–29)
CREAT SERPL-MCNC: 0.79 MG/DL (ref 0.76–1.27)
DEPRECATED RDW RBC AUTO: 45.2 FL (ref 37–54)
EGFRCR SERPLBLD CKD-EPI 2021: 93.8 ML/MIN/1.73
EOSINOPHIL # BLD AUTO: 0.52 10*3/MM3 (ref 0–0.4)
EOSINOPHIL NFR BLD AUTO: 6.9 % (ref 0.3–6.2)
ERYTHROCYTE [DISTWIDTH] IN BLOOD BY AUTOMATED COUNT: 13.8 % (ref 12.3–15.4)
GEN 5 1HR TROPONIN T REFLEX: 14 NG/L
GLOBULIN UR ELPH-MCNC: 2.9 GM/DL
GLUCOSE SERPL-MCNC: 107 MG/DL (ref 65–99)
HCT VFR BLD AUTO: 44.1 % (ref 37.5–51)
HGB BLD-MCNC: 14.7 G/DL (ref 13–17.7)
HOLD SPECIMEN: NORMAL
HOLD SPECIMEN: NORMAL
IMM GRANULOCYTES # BLD AUTO: 0.03 10*3/MM3 (ref 0–0.05)
IMM GRANULOCYTES NFR BLD AUTO: 0.4 % (ref 0–0.5)
LIPASE SERPL-CCNC: 38 U/L (ref 13–60)
LYMPHOCYTES # BLD AUTO: 2.52 10*3/MM3 (ref 0.7–3.1)
LYMPHOCYTES NFR BLD AUTO: 33.3 % (ref 19.6–45.3)
MCH RBC QN AUTO: 29.9 PG (ref 26.6–33)
MCHC RBC AUTO-ENTMCNC: 33.3 G/DL (ref 31.5–35.7)
MCV RBC AUTO: 89.8 FL (ref 79–97)
MONOCYTES # BLD AUTO: 0.67 10*3/MM3 (ref 0.1–0.9)
MONOCYTES NFR BLD AUTO: 8.9 % (ref 5–12)
NEUTROPHILS NFR BLD AUTO: 3.74 10*3/MM3 (ref 1.7–7)
NEUTROPHILS NFR BLD AUTO: 49.4 % (ref 42.7–76)
NRBC BLD AUTO-RTO: 0 /100 WBC (ref 0–0.2)
PLATELET # BLD AUTO: 281 10*3/MM3 (ref 140–450)
PMV BLD AUTO: 8.6 FL (ref 6–12)
POTASSIUM SERPL-SCNC: 4.2 MMOL/L (ref 3.5–5.2)
PROT SERPL-MCNC: 6.4 G/DL (ref 6–8.5)
RBC # BLD AUTO: 4.91 10*6/MM3 (ref 4.14–5.8)
SODIUM SERPL-SCNC: 135 MMOL/L (ref 136–145)
TROPONIN T NUMERIC DELTA: 3 NG/L
TROPONIN T SERPL HS-MCNC: 11 NG/L
WBC NRBC COR # BLD AUTO: 7.56 10*3/MM3 (ref 3.4–10.8)
WHOLE BLOOD HOLD COAG: NORMAL
WHOLE BLOOD HOLD SPECIMEN: NORMAL

## 2025-01-14 PROCEDURE — 93005 ELECTROCARDIOGRAM TRACING: CPT | Performed by: NURSE PRACTITIONER

## 2025-01-14 PROCEDURE — 83690 ASSAY OF LIPASE: CPT | Performed by: STUDENT IN AN ORGANIZED HEALTH CARE EDUCATION/TRAINING PROGRAM

## 2025-01-14 PROCEDURE — 99285 EMERGENCY DEPT VISIT HI MDM: CPT

## 2025-01-14 PROCEDURE — 84484 ASSAY OF TROPONIN QUANT: CPT | Performed by: NURSE PRACTITIONER

## 2025-01-14 PROCEDURE — 71045 X-RAY EXAM CHEST 1 VIEW: CPT | Performed by: RADIOLOGY

## 2025-01-14 PROCEDURE — 96374 THER/PROPH/DIAG INJ IV PUSH: CPT

## 2025-01-14 PROCEDURE — 96375 TX/PRO/DX INJ NEW DRUG ADDON: CPT

## 2025-01-14 PROCEDURE — 25510000001 IOPAMIDOL 61 % SOLUTION: Performed by: STUDENT IN AN ORGANIZED HEALTH CARE EDUCATION/TRAINING PROGRAM

## 2025-01-14 PROCEDURE — 25010000002 ONDANSETRON PER 1 MG: Performed by: STUDENT IN AN ORGANIZED HEALTH CARE EDUCATION/TRAINING PROGRAM

## 2025-01-14 PROCEDURE — 74177 CT ABD & PELVIS W/CONTRAST: CPT

## 2025-01-14 PROCEDURE — 36415 COLL VENOUS BLD VENIPUNCTURE: CPT | Performed by: NURSE PRACTITIONER

## 2025-01-14 PROCEDURE — 85025 COMPLETE CBC W/AUTO DIFF WBC: CPT | Performed by: NURSE PRACTITIONER

## 2025-01-14 PROCEDURE — 25010000002 MORPHINE PER 10 MG: Performed by: STUDENT IN AN ORGANIZED HEALTH CARE EDUCATION/TRAINING PROGRAM

## 2025-01-14 PROCEDURE — 80053 COMPREHEN METABOLIC PANEL: CPT | Performed by: NURSE PRACTITIONER

## 2025-01-14 PROCEDURE — 36415 COLL VENOUS BLD VENIPUNCTURE: CPT

## 2025-01-14 PROCEDURE — 93010 ELECTROCARDIOGRAM REPORT: CPT | Performed by: STUDENT IN AN ORGANIZED HEALTH CARE EDUCATION/TRAINING PROGRAM

## 2025-01-14 PROCEDURE — 71045 X-RAY EXAM CHEST 1 VIEW: CPT

## 2025-01-14 PROCEDURE — 74177 CT ABD & PELVIS W/CONTRAST: CPT | Performed by: RADIOLOGY

## 2025-01-14 RX ORDER — ORPHENADRINE CITRATE 30 MG/ML
60 INJECTION INTRAMUSCULAR; INTRAVENOUS ONCE
Status: DISCONTINUED | OUTPATIENT
Start: 2025-01-14 | End: 2025-01-14 | Stop reason: HOSPADM

## 2025-01-14 RX ORDER — ASPIRIN 81 MG/1
324 TABLET, CHEWABLE ORAL ONCE
Status: DISCONTINUED | OUTPATIENT
Start: 2025-01-14 | End: 2025-01-14 | Stop reason: HOSPADM

## 2025-01-14 RX ORDER — ONDANSETRON 2 MG/ML
4 INJECTION INTRAMUSCULAR; INTRAVENOUS ONCE
Status: COMPLETED | OUTPATIENT
Start: 2025-01-14 | End: 2025-01-14

## 2025-01-14 RX ORDER — IOPAMIDOL 612 MG/ML
100 INJECTION, SOLUTION INTRAVASCULAR
Status: COMPLETED | OUTPATIENT
Start: 2025-01-14 | End: 2025-01-14

## 2025-01-14 RX ORDER — KETOROLAC TROMETHAMINE 30 MG/ML
15 INJECTION, SOLUTION INTRAMUSCULAR; INTRAVENOUS ONCE
Status: DISCONTINUED | OUTPATIENT
Start: 2025-01-14 | End: 2025-01-14 | Stop reason: HOSPADM

## 2025-01-14 RX ADMIN — MORPHINE SULFATE 4 MG: 4 INJECTION, SOLUTION INTRAMUSCULAR; INTRAVENOUS at 20:29

## 2025-01-14 RX ADMIN — ONDANSETRON 4 MG: 2 INJECTION INTRAMUSCULAR; INTRAVENOUS at 20:29

## 2025-01-14 RX ADMIN — IOPAMIDOL 100 ML: 612 INJECTION, SOLUTION INTRAVENOUS at 19:56
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
## 2025-01-14 NOTE — ED NOTES
MEDICAL SCREENING:    Reason for Visit: Chest pain      Patient initially seen in triage.  The patient was advised further evaluation and diagnostic testing will be needed, some of the treatment and testing will be initiated in the lobby in order to begin the process.  The patient will be returned to the waiting area for the time being and possibly be re-assessed by a subsequent ED provider.  The patient will be brought back to the treatment area in as timely manner as possible.      Johnnie Aguilar, APRN  01/14/25 1734

## 2025-01-15 LAB
QT INTERVAL: 380 MS
QTC INTERVAL: 433 MS

## 2025-01-15 RX ORDER — METOPROLOL TARTRATE 25 MG/1
25 TABLET, FILM COATED ORAL 2 TIMES DAILY
Qty: 60 TABLET | Refills: 11 | Status: SHIPPED | OUTPATIENT
Start: 2025-01-15

## 2025-01-15 NOTE — ED NOTES
Went to draw troponin and patient and his family were questioning the need. Also wanted to know who to talk to about having his pancreas checked. Dr Sheridan made aware.

## 2025-01-30 ENCOUNTER — OFFICE VISIT (OUTPATIENT)
Dept: CARDIOLOGY | Facility: CLINIC | Age: 75
End: 2025-01-30
Payer: OTHER GOVERNMENT

## 2025-01-30 VITALS
DIASTOLIC BLOOD PRESSURE: 85 MMHG | BODY MASS INDEX: 28.84 KG/M2 | OXYGEN SATURATION: 98 % | HEART RATE: 74 BPM | WEIGHT: 206 LBS | SYSTOLIC BLOOD PRESSURE: 150 MMHG | HEIGHT: 71 IN

## 2025-01-30 DIAGNOSIS — I10 ESSENTIAL HYPERTENSION: ICD-10-CM

## 2025-01-30 DIAGNOSIS — I25.10 ASCVD (ARTERIOSCLEROTIC CARDIOVASCULAR DISEASE): ICD-10-CM

## 2025-01-30 DIAGNOSIS — E78.2 MIXED HYPERLIPIDEMIA: Primary | ICD-10-CM

## 2025-01-30 PROCEDURE — 99214 OFFICE O/P EST MOD 30 MIN: CPT | Performed by: PHYSICIAN ASSISTANT

## 2025-01-30 NOTE — PROGRESS NOTES
Rita Orr PA  Bryson Baker  1950 01/30/2025    Patient Active Problem List   Diagnosis    Essential hypertension    Mixed hyperlipidemia    ASCVD with 50% stenosis in RCA in 2013    PVD (peripheral vascular disease)    Precordial chest pain    Tobacco abuse    Palpitations    Emphysema    Multiple pulmonary nodules    GAYTAN (dyspnea on exertion)    History of colon polyps    Diastasis recti    Umbilical hernia without obstruction and without gangrene    Gastroesophageal reflux disease without esophagitis    Acute pancreatitis without infection or necrosis, unspecified pancreatitis type    Fatty liver    Lumbar degenerative disc disease    Left inguinal hernia    Abnormal CT of the abdomen    Obesity due to excess calories    Diverticulitis    Chronic pancreatitis       Dear Rita Orr PA:    Subjective     History of Present Illness:    Chief Complaint   Patient presents with    Follow-up     ROUTINE       Bryson Baker is a pleasant 74 y.o. male with a past medical history significant for nonobstructive CAD with 50% stenosis seen in the RCA in 2014, tobacco abuse, essential hypertension.  He comes in today for cardiology follow-up     Mr. Baker has no open complaints he reports he is chest pain-free and denies any shortness of breath.  He also denies any palpitations, dizziness, or syncope.  Blood pressure is elevated today but he admits to some noncompliance and that he forgot all of his medications this morning      No Known Allergies:      Current Outpatient Medications:     albuterol sulfate  (90 Base) MCG/ACT inhaler, Inhale 2 puffs Every 4 (Four) Hours As Needed for Wheezing., Disp: 54 g, Rfl: 3    amLODIPine (NORVASC) 5 MG tablet, Take 1 tablet by mouth Daily., Disp: 90 tablet, Rfl: 3    aspirin 81 MG EC tablet, Take 1 tablet by mouth 2 (Two) Times a Day., Disp: , Rfl:     atorvastatin (LIPITOR) 40 MG tablet, Take 0.5 tablets by mouth Daily., Disp: , Rfl:     celecoxib  (CeleBREX) 200 MG capsule, Take 1 capsule by mouth Daily., Disp: 90 capsule, Rfl: 3    famotidine (PEPCID) 20 MG tablet, 1 tablet., Disp: , Rfl:     fluticasone (FLOVENT HFA) 220 MCG/ACT inhaler, Inhale 2 puffs 2 (Two) Times a Day., Disp: 36 g, Rfl: 3    gabapentin (NEURONTIN) 400 MG capsule, Take 600 mg by mouth 3 (Three) Times a Day., Disp: , Rfl:     HYDROcodone-acetaminophen (NORCO)  MG per tablet, Take 1 tablet by mouth 3 (Three) Times a Day., Disp: , Rfl:     losartan (COZAAR) 100 MG tablet, Take 1 tablet by mouth Daily., Disp: 90 tablet, Rfl: 3    metoprolol tartrate (LOPRESSOR) 25 MG tablet, Take 1 tablet by mouth 2 (Two) Times a Day., Disp: 60 tablet, Rfl: 11    nicotine polacrilex (COMMIT) 4 MG lozenge, Apply 1 lozenge to cheek., Disp: , Rfl:     pantoprazole (PROTONIX) 40 MG EC tablet, Take 1 tablet by mouth 2 (Two) Times a Day., Disp: , Rfl:     ranolazine (Ranexa) 1000 MG 12 hr tablet, Take 1 tablet by mouth 2 (Two) Times a Day., Disp: 60 tablet, Rfl: 3    sildenafil (VIAGRA) 100 MG tablet, Take 1 tablet by mouth Daily As Needed for Erectile Dysfunction., Disp: , Rfl:     tiotropium bromide monohydrate (SPIRIVA RESPIMAT) 2.5 MCG/ACT aerosol solution inhaler, Inhale 1 puff Daily., Disp: 1 inhaler, Rfl: 5    tiotropium bromide-olodaterol (STIOLTO RESPIMAT) 2.5-2.5 MCG/ACT aerosol solution inhaler, Inhale 2 puffs Daily., Disp: 4 g, Rfl: 5    The following portions of the patient's history were reviewed and updated as appropriate: allergies, current medications, past family history, past medical history, past social history, past surgical history and problem list.    Social History     Tobacco Use    Smoking status: Every Day     Current packs/day: 1.00     Average packs/day: 1 pack/day for 15.0 years (15.0 ttl pk-yrs)     Types: Cigarettes     Passive exposure: Current    Smokeless tobacco: Never    Tobacco comments:     had quit but started back today 10/23/18   Vaping Use    Vaping status: Never  "Used   Substance Use Topics    Alcohol use: No    Drug use: No         Objective   Vitals:    01/30/25 1446   BP: 150/85   Pulse: 74   SpO2: 98%   Weight: 93.4 kg (206 lb)   Height: 180.3 cm (71\")     Body mass index is 28.73 kg/m².    ROS    Constitutional:       General: Not in acute distress.     Appearance: Healthy appearance. Well-developed and not in distress. Not diaphoretic.   Eyes:      Conjunctiva/sclera: Conjunctivae normal.      Pupils: Pupils are equal, round, and reactive to light.   HENT:      Head: Normocephalic and atraumatic.   Neck:      Vascular: No carotid bruit or JVD.   Pulmonary:      Effort: Pulmonary effort is normal. No respiratory distress.      Breath sounds: Normal breath sounds.   Cardiovascular:      Normal rate. Regular rhythm.   Edema:     Peripheral edema absent.   Skin:     General: Skin is cool.   Neurological:      Mental Status: Alert, oriented to person, place, and time and oriented to person, place and time.         Lab Results   Component Value Date     (L) 01/14/2025    K 4.2 01/14/2025     01/14/2025    CO2 22.5 01/14/2025    BUN 19 01/14/2025    CREATININE 0.79 01/14/2025    GLUCOSE 107 (H) 01/14/2025    CALCIUM 8.7 01/14/2025    AST 9 01/14/2025    ALT 10 01/14/2025    ALKPHOS 120 (H) 01/14/2025    LABIL2 1.7 10/17/2022     Lab Results   Component Value Date    CKTOTAL 103 10/17/2022     Lab Results   Component Value Date    WBC 7.56 01/14/2025    HGB 14.7 01/14/2025    HCT 44.1 01/14/2025     01/14/2025     Lab Results   Component Value Date    INR 1.14 (H) 08/30/2018     Lab Results   Component Value Date    MG 2.0 11/30/2022     Lab Results   Component Value Date    TSH 1.610 10/18/2022    CHLPL 116 10/10/2022    TRIG 78 03/18/2024    HDL 40 03/18/2024    LDL 61 03/18/2024      Lab Results   Component Value Date    BNP 43.0 09/07/2018       During this visit the following were done:  Labs Reviewed []    Labs Ordered []    Radiology Reports Reviewed " []    Radiology Ordered []    PCP Records Reviewed []    Referring Provider Records Reviewed []    ER Records Reviewed []    Hospital Records Reviewed []    History Obtained From Family []    Radiology Images Reviewed []    Other Reviewed []    Records Requested []       Procedures    Assessment & Plan    Diagnosis Plan   1. Mixed hyperlipidemia        2. Essential hypertension        3. ASCVD with 50% stenosis in RCA in 2013                 Recommendations:  Essential hypertension  Elevated today he admits he is not checking blood pressure regularly also admits to some noncompliance has not taking any of his antihypertensives today.  I asked him to resume medications when he gets home and to monitor blood pressure regularly  CAD  Denies any anginal symptoms continue aspirin, Lipitor, losartan, Ranexa  Dyslipidemia  Continue Lipitor    No follow-ups on file.    As always, I appreciate very much the opportunity to participate in the cardiovascular care of your patients.      With Best Regards,    Danny Oneal PA-C

## 2025-03-13 ENCOUNTER — OFFICE VISIT (OUTPATIENT)
Dept: SURGERY | Facility: CLINIC | Age: 75
End: 2025-03-13
Payer: OTHER GOVERNMENT

## 2025-03-13 VITALS
WEIGHT: 199 LBS | HEIGHT: 71 IN | BODY MASS INDEX: 27.86 KG/M2 | DIASTOLIC BLOOD PRESSURE: 71 MMHG | SYSTOLIC BLOOD PRESSURE: 142 MMHG

## 2025-03-13 DIAGNOSIS — K40.90 LEFT INGUINAL HERNIA: ICD-10-CM

## 2025-03-13 DIAGNOSIS — K86.2 PANCREATIC CYST: Primary | ICD-10-CM

## 2025-03-13 PROCEDURE — 99213 OFFICE O/P EST LOW 20 MIN: CPT | Performed by: SURGERY
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
## 2025-03-20 ENCOUNTER — PATIENT ROUNDING (BHMG ONLY) (OUTPATIENT)
Dept: FAMILY MEDICINE CLINIC | Facility: CLINIC | Age: 75
End: 2025-03-20
Payer: OTHER GOVERNMENT

## 2025-03-20 ENCOUNTER — OFFICE VISIT (OUTPATIENT)
Dept: FAMILY MEDICINE CLINIC | Facility: CLINIC | Age: 75
End: 2025-03-20
Payer: OTHER GOVERNMENT

## 2025-03-20 VITALS
DIASTOLIC BLOOD PRESSURE: 70 MMHG | OXYGEN SATURATION: 91 % | RESPIRATION RATE: 20 BRPM | WEIGHT: 201.2 LBS | SYSTOLIC BLOOD PRESSURE: 140 MMHG | BODY MASS INDEX: 28.17 KG/M2 | TEMPERATURE: 98.7 F | HEIGHT: 71 IN | HEART RATE: 76 BPM

## 2025-03-20 DIAGNOSIS — F17.210 CIGARETTE NICOTINE DEPENDENCE WITHOUT COMPLICATION: Chronic | ICD-10-CM

## 2025-03-20 DIAGNOSIS — K86.1 CHRONIC PANCREATITIS, UNSPECIFIED PANCREATITIS TYPE: ICD-10-CM

## 2025-03-20 DIAGNOSIS — N52.9 ERECTILE DYSFUNCTION, UNSPECIFIED ERECTILE DYSFUNCTION TYPE: ICD-10-CM

## 2025-03-20 DIAGNOSIS — J44.1 COPD WITH EXACERBATION: Primary | ICD-10-CM

## 2025-03-20 DIAGNOSIS — M47.25 OSTEOARTHRITIS OF SPINE WITH RADICULOPATHY, THORACOLUMBAR REGION: Chronic | ICD-10-CM

## 2025-03-20 PROCEDURE — 99214 OFFICE O/P EST MOD 30 MIN: CPT | Performed by: PHYSICIAN ASSISTANT

## 2025-03-20 RX ORDER — SILDENAFIL 100 MG/1
100 TABLET, FILM COATED ORAL DAILY PRN
Qty: 30 TABLET | Refills: 3 | Status: SHIPPED | OUTPATIENT
Start: 2025-03-20

## 2025-03-20 RX ORDER — CELECOXIB 200 MG/1
200 CAPSULE ORAL DAILY
Qty: 90 CAPSULE | Refills: 3 | Status: SHIPPED | OUTPATIENT
Start: 2025-03-20

## 2025-03-20 RX ORDER — PREDNISONE 20 MG/1
40 TABLET ORAL DAILY
Qty: 10 TABLET | Refills: 0 | Status: SHIPPED | OUTPATIENT
Start: 2025-03-20

## 2025-03-20 RX ORDER — DOXYCYCLINE 100 MG/1
100 CAPSULE ORAL 2 TIMES DAILY
Qty: 20 CAPSULE | Refills: 0 | Status: SHIPPED | OUTPATIENT
Start: 2025-03-20 | End: 2025-03-30

## 2025-03-20 NOTE — PROGRESS NOTES
"Chief Complaint -cough    History of Present Illness -     Bryson Baker is a 74 y.o. male.     Cough-  He complains of productive cough with associated wheezing for the past week.  Minimal relief with albuterol, Spiriva and Stiolto.    COPD-  Not at goal due to continued smoking and acute symptoms as listed above.  Advised smoking cessation and patient declines help with smoking cessation at this time    Osteoarthritis-  Stable osteoarthritis of the spine with the use of celecoxib.    1/14/2025 CT abdomen and pelvis revealed  Mild acute pancreatitis involving the pancreatic body and tail.  Small size hiatal hernia.  Fatty filtration of the liver.  Normal appendix  Mild generalized colon diverticulosis  Constipation involving the right colon and transverse colon segments.  Chronic bilateral L4 and L5 pars defects with associated chronic grade 1  anterolisthesis of L4 on L5 and L5 on S1.  Moderate bilateral neural foraminal stenoses at the L4-5 and L5-S1  levels with contribution from the subluxations and posterior lateral  disc bulge components.  No free fluid or free air.  No abscess or hematoma    Chronic pancreatitis-  Patient has been seen by Dr. Burch with regards to the pancreatitis and irregular CT scan.  An MRI of the abdomen is pending.  Patient denies any fever but states he does have some abdominal pain today consistent with his previous pancreatitis pain    Erectile dysfunction-  Stable with sildenafil    The following portions of the patient's history were reviewed and updated as appropriate: allergies, current medications, past family history, past medical history, past social history, past surgical history and problem list.        Objective  Vital signs:  /70 (BP Location: Left arm, Patient Position: Sitting, Cuff Size: Adult)   Pulse 76   Temp 98.7 °F (37.1 °C) (Temporal)   Resp 20   Ht 180.3 cm (70.98\")   Wt 91.3 kg (201 lb 3.2 oz)   SpO2 91%   BMI 28.08 kg/m²     Physical " Exam  Vitals and nursing note reviewed.   Constitutional:       General: He is not in acute distress.     Appearance: Normal appearance. He is well-developed. He is not diaphoretic.   HENT:      Head: Normocephalic and atraumatic.      Right Ear: Tympanic membrane, ear canal and external ear normal.      Left Ear: Tympanic membrane, ear canal and external ear normal.      Nose: Nose normal.      Mouth/Throat:      Mouth: Mucous membranes are moist.      Pharynx: No oropharyngeal exudate or posterior oropharyngeal erythema.   Neck:      Thyroid: No thyromegaly.   Cardiovascular:      Rate and Rhythm: Normal rate and regular rhythm.      Heart sounds: Normal heart sounds. No murmur heard.  Pulmonary:      Effort: Pulmonary effort is normal.      Breath sounds: Normal breath sounds. No wheezing or rales.   Musculoskeletal:      Cervical back: Normal range of motion and neck supple.   Lymphadenopathy:      Cervical: No cervical adenopathy.   Skin:     General: Skin is warm and dry.      Findings: No rash.   Neurological:      Mental Status: He is alert and oriented to person, place, and time.   Psychiatric:         Mood and Affect: Mood normal.         Behavior: Behavior normal.         Thought Content: Thought content normal.         The following data was reviewed by Rita Orr PA-C:    Data reviewed : Radiologic studies CT of abdomen and Consultant notes surgical consult      Lab Results   Component Value Date    BUN 19 01/14/2025    CREATININE 0.79 01/14/2025    EGFR 93.8 01/14/2025    ALT 10 01/14/2025    AST 9 01/14/2025    WBC 7.56 01/14/2025    HGB 14.7 01/14/2025    HCT 44.1 01/14/2025     01/14/2025    CHOL 117 03/18/2024    TRIG 78 03/18/2024    HDL 40 03/18/2024    LDL 61 03/18/2024    TSH 1.610 10/18/2022    HGBA1C 5.40 10/18/2022           Assessment & Plan     Diagnoses and all orders for this visit:    1. COPD with exacerbation (Primary)  -     doxycycline (VIBRAMYCIN) 100 MG capsule; Take 1  capsule by mouth 2 (Two) Times a Day for 10 days.  Dispense: 20 capsule; Refill: 0  -     predniSONE (DELTASONE) 20 MG tablet; Take 2 tablets by mouth Daily. 2 daily  Dispense: 10 tablet; Refill: 0    2. Osteoarthritis of spine with radiculopathy, thoracolumbar region  Comments:  Continue Celebrex  Advised conservative measures and daily stretching  Orders:  -     celecoxib (CeleBREX) 200 MG capsule; Take 1 capsule by mouth Daily.  Dispense: 90 capsule; Refill: 3    3. Erectile dysfunction, unspecified erectile dysfunction type  -     sildenafil (VIAGRA) 100 MG tablet; Take 1 tablet by mouth Daily As Needed for Erectile Dysfunction.  Dispense: 30 tablet; Refill: 3    4. Cigarette nicotine dependence without complication  Comments:  Patient declines help with smoking cessation  Orders:  -     CT Chest Low Dose Wo; Future    5. Chronic pancreatitis, unspecified pancreatitis type  Comments:  MRI is pending  Advised patient to follow-up with Dr. Burch        BMI is >= 25 and <30. (Overweight) The following options were offered after discussion;: exercise counseling/recommendations and nutrition counseling/recommendations          Patient was given instructions and counseling regarding his condition or for health maintenance advice. Please see specific information pulled into the AVS if appropriate      This document has been electronically signed by:  Rita Orr PA-C

## 2025-03-20 NOTE — PROGRESS NOTES
March 20, 2025    Hello, may I speak with Bryson Baker?    My name is Sada Zekejaquelineperla     I am  with [facility]  [address] [place] [id number] [phone].    Before we get started may I verify your date of birth? 1950    I am calling to officially welcome you to our practice and ask about your recent visit. Is this a good time to talk? yes    Tell me about your visit with us. What things went well?  Had a good visit - spoke at check out       We're always looking for ways to make our patients' experiences even better. Do you have recommendations on ways we may improve?  no    Overall were you satisfied with your first visit to our practice? yes       I appreciate you taking the time to speak with me today. Is there anything else I can do for you? no      Thank you, and have a great day.

## 2025-04-07 ENCOUNTER — HOSPITAL ENCOUNTER (OUTPATIENT)
Dept: MRI IMAGING | Facility: HOSPITAL | Age: 75
Discharge: HOME | End: 2025-04-07
Payer: OTHER GOVERNMENT

## 2025-04-07 ENCOUNTER — HOSPITAL ENCOUNTER (OUTPATIENT)
Dept: CT IMAGING | Facility: HOSPITAL | Age: 75
Discharge: HOME | End: 2025-04-07
Payer: OTHER GOVERNMENT

## 2025-04-07 DIAGNOSIS — F17.210 CIGARETTE NICOTINE DEPENDENCE WITHOUT COMPLICATION: Chronic | ICD-10-CM

## 2025-04-07 DIAGNOSIS — K86.2 PANCREATIC CYST: ICD-10-CM

## 2025-04-07 LAB — CREAT BLDA-MCNC: 1.1 MG/DL (ref 0.6–1.3)

## 2025-04-07 PROCEDURE — 82565 ASSAY OF CREATININE: CPT

## 2025-04-07 PROCEDURE — 25510000002 GADOBENATE DIMEGLUMINE 529 MG/ML SOLUTION: Performed by: SURGERY

## 2025-04-07 PROCEDURE — 71271 CT THORAX LUNG CANCER SCR C-: CPT

## 2025-04-07 PROCEDURE — 71271 CT THORAX LUNG CANCER SCR C-: CPT | Performed by: RADIOLOGY

## 2025-04-07 PROCEDURE — 74183 MRI ABD W/O CNTR FLWD CNTR: CPT | Performed by: RADIOLOGY

## 2025-04-07 PROCEDURE — A9577 INJ MULTIHANCE: HCPCS | Performed by: SURGERY

## 2025-04-07 PROCEDURE — 74183 MRI ABD W/O CNTR FLWD CNTR: CPT

## 2025-04-07 RX ADMIN — GADOBENATE DIMEGLUMINE 18 ML: 529 INJECTION, SOLUTION INTRAVENOUS at 13:30

## 2025-04-17 ENCOUNTER — OFFICE VISIT (OUTPATIENT)
Dept: SURGERY | Facility: CLINIC | Age: 75
End: 2025-04-17
Payer: OTHER GOVERNMENT

## 2025-04-17 VITALS
HEIGHT: 71 IN | SYSTOLIC BLOOD PRESSURE: 140 MMHG | WEIGHT: 201 LBS | BODY MASS INDEX: 28.14 KG/M2 | DIASTOLIC BLOOD PRESSURE: 71 MMHG

## 2025-04-17 DIAGNOSIS — K86.2 PANCREATIC CYST: Primary | ICD-10-CM

## 2025-04-17 DIAGNOSIS — R10.12 LEFT UPPER QUADRANT ABDOMINAL PAIN: ICD-10-CM

## 2025-04-17 PROCEDURE — 99213 OFFICE O/P EST LOW 20 MIN: CPT | Performed by: SURGERY

## 2025-04-17 RX ORDER — SODIUM CHLORIDE 0.9 % (FLUSH) 0.9 %
10 SYRINGE (ML) INJECTION AS NEEDED
OUTPATIENT
Start: 2025-04-17

## 2025-04-17 RX ORDER — SODIUM CHLORIDE 9 MG/ML
40 INJECTION, SOLUTION INTRAVENOUS AS NEEDED
OUTPATIENT
Start: 2025-04-17

## 2025-04-17 RX ORDER — SODIUM CHLORIDE 0.9 % (FLUSH) 0.9 %
10 SYRINGE (ML) INJECTION EVERY 12 HOURS SCHEDULED
OUTPATIENT
Start: 2025-04-17
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
## 2025-05-27 RX ORDER — RANOLAZINE 1000 MG/1
1000 TABLET, EXTENDED RELEASE ORAL 2 TIMES DAILY
Qty: 60 TABLET | Refills: 3 | Status: SHIPPED | OUTPATIENT
Start: 2025-05-27

## 2025-05-27 RX ORDER — METOPROLOL TARTRATE 25 MG/1
25 TABLET, FILM COATED ORAL 2 TIMES DAILY
Qty: 60 TABLET | Refills: 11 | Status: SHIPPED | OUTPATIENT
Start: 2025-05-27

## 2025-05-27 NOTE — TELEPHONE ENCOUNTER
DELETE AFTER REVIEWING: Send the encounter HIGH priority, If patient has less than a 3 day supply. If the patient will run out of medication over the weekend add that information to the additional details line. Send to the appropriate pool. Check your call action grid or workflows.    Caller: Bryson Baker    Relationship: Self    Best call back number: [phone]      Requested Prescriptions:   Requested Prescriptions     Pending Prescriptions Disp Refills    ranolazine (Ranexa) 1000 MG 12 hr tablet 60 tablet 3     Sig: Take 1 tablet by mouth 2 (Two) Times a Day.    metoprolol tartrate (LOPRESSOR) 25 MG tablet 60 tablet 11     Sig: Take 1 tablet by mouth 2 (Two) Times a Day.        Pharmacy where request should be sent: [facility] - [address] - [phone]  - [phone] FX     Last office visit with prescribing clinician: 1/30/2025   Last telemedicine visit with prescribing clinician: Visit date not found   Next office visit with prescribing clinician: 7/30/2025     Additional details provided by patient:     Does the patient have less than a 3 day supply:  [] Yes  [x] No    Would you like a call back once the refill request has been completed: [] Yes [] No    If the office needs to give you a call back, can they leave a voicemail: [] Yes [] No    Amada Sigala Rep   05/27/25 13:37 EDT

## 2025-05-28 ENCOUNTER — TELEPHONE (OUTPATIENT)
Dept: SURGERY | Age: 75
End: 2025-05-28
Payer: OTHER GOVERNMENT

## 2025-05-28 NOTE — TELEPHONE ENCOUNTER
You are scheduled for a colonoscopy and EGD with Dr. Vladislav Burch on May 30 2025 at 12:30.   Dr. Burch's colonoscopy prep is a 2 day regimen.    Day 1: Clear liquid diet (items below) all day from the time you wake up until midnight. Between 1-3 pm Take 4 Dulcolax tabs with 8 oz of liquid.     Soft Drink Mt. Dew, Sprite, Ginger-Breana (Yellow to clear in color)   Broth Beef or Chicken   Jell-O No ORANGE, RED or PURPLE    Gatorade No ORANGE,RED or PURPLE    Popsicles No ORANGE, RED or PURPLE    Coffee/Tea Black ONLY (no cream or sugar for tea or coffee)   Water Tap or Bottled   Juice Apple or White Grape       Day 2:  Clear liquid diet (items below) all day from the time you wake up until midnight. Between 1-3 pm Take 4 Dulcolax tabs with 8 oz of liquid.   At 4 pm Mix 32 oz of Gatorade Zero (No RED,ORANGE,PURPLE) with a 238 gram bottle of Miralax (store brand is fine). Once thoroughly mixed, drink entire contents within 2 hours.  Follow up prep by drinking 32 oz of plain water.     Soft Drink Mt. Dew, Sprite, Ginger-Breana (Yellow to clear in color)   Broth Beef or Chicken   Jell-O No ORANGE, RED or PURPLE    Gatorade No ORANGE,RED or PURPLE    Popsicles No ORANGE, RED or PURPLE    Coffee/Tea Black ONLY (no cream or sugar for tea or coffee)   Water Tap or Bottled   Juice Apple or White Grape        All patients are to be NPO (no food or drink) after midnight the night before surgery.     All patients must have a  accompany them on the day of surgery. That person is REQUIRED to stay here on the hospital campus during your surgery! They cannot drop you off and come back to pick you up!

## 2025-05-30 ENCOUNTER — HOSPITAL ENCOUNTER (OUTPATIENT)
Facility: HOSPITAL | Age: 75
Setting detail: HOSPITAL OUTPATIENT SURGERY
Discharge: HOME | End: 2025-05-30
Attending: SURGERY | Admitting: SURGERY
Payer: OTHER GOVERNMENT

## 2025-05-30 ENCOUNTER — ANESTHESIA (OUTPATIENT)
Dept: PERIOP | Facility: HOSPITAL | Age: 75
End: 2025-05-30
Payer: OTHER GOVERNMENT

## 2025-05-30 ENCOUNTER — ANESTHESIA EVENT (OUTPATIENT)
Dept: PERIOP | Facility: HOSPITAL | Age: 75
End: 2025-05-30
Payer: OTHER GOVERNMENT

## 2025-05-30 VITALS
TEMPERATURE: 97.2 F | OXYGEN SATURATION: 95 % | DIASTOLIC BLOOD PRESSURE: 85 MMHG | BODY MASS INDEX: 25.7 KG/M2 | RESPIRATION RATE: 18 BRPM | HEART RATE: 52 BPM | HEIGHT: 71 IN | WEIGHT: 183.6 LBS | SYSTOLIC BLOOD PRESSURE: 115 MMHG

## 2025-05-30 DIAGNOSIS — R10.12 LEFT UPPER QUADRANT ABDOMINAL PAIN: Primary | ICD-10-CM

## 2025-05-30 DIAGNOSIS — R10.12 LEFT UPPER QUADRANT ABDOMINAL PAIN: ICD-10-CM

## 2025-05-30 PROCEDURE — 25010000002 PROPOFOL 10 MG/ML EMULSION: Performed by: NURSE ANESTHETIST, CERTIFIED REGISTERED

## 2025-05-30 PROCEDURE — S0260 H&P FOR SURGERY: HCPCS | Performed by: SURGERY

## 2025-05-30 PROCEDURE — 43239 EGD BIOPSY SINGLE/MULTIPLE: CPT | Performed by: SURGERY

## 2025-05-30 RX ORDER — SODIUM CHLORIDE 0.9 % (FLUSH) 0.9 %
10 SYRINGE (ML) INJECTION EVERY 12 HOURS SCHEDULED
Status: DISCONTINUED | OUTPATIENT
Start: 2025-05-30 | End: 2025-05-30 | Stop reason: HOSPADM

## 2025-05-30 RX ORDER — SODIUM CHLORIDE 9 MG/ML
40 INJECTION, SOLUTION INTRAVENOUS AS NEEDED
Status: DISCONTINUED | OUTPATIENT
Start: 2025-05-30 | End: 2025-05-30 | Stop reason: HOSPADM

## 2025-05-30 RX ORDER — SODIUM CHLORIDE, SODIUM LACTATE, POTASSIUM CHLORIDE, CALCIUM CHLORIDE 600; 310; 30; 20 MG/100ML; MG/100ML; MG/100ML; MG/100ML
125 INJECTION, SOLUTION INTRAVENOUS ONCE
Status: DISCONTINUED | OUTPATIENT
Start: 2025-05-30 | End: 2025-05-30 | Stop reason: HOSPADM

## 2025-05-30 RX ORDER — ONDANSETRON 2 MG/ML
4 INJECTION INTRAMUSCULAR; INTRAVENOUS AS NEEDED
Status: DISCONTINUED | OUTPATIENT
Start: 2025-05-30 | End: 2025-05-30 | Stop reason: HOSPADM

## 2025-05-30 RX ORDER — OXYCODONE AND ACETAMINOPHEN 5; 325 MG/1; MG/1
1 TABLET ORAL ONCE AS NEEDED
Status: DISCONTINUED | OUTPATIENT
Start: 2025-05-30 | End: 2025-05-30 | Stop reason: HOSPADM

## 2025-05-30 RX ORDER — SODIUM CHLORIDE 9 MG/ML
40 INJECTION, SOLUTION INTRAVENOUS AS NEEDED
OUTPATIENT
Start: 2025-05-30

## 2025-05-30 RX ORDER — MIDAZOLAM HYDROCHLORIDE 1 MG/ML
0.5 INJECTION, SOLUTION INTRAMUSCULAR; INTRAVENOUS
Status: DISCONTINUED | OUTPATIENT
Start: 2025-05-30 | End: 2025-05-30 | Stop reason: HOSPADM

## 2025-05-30 RX ORDER — SODIUM CHLORIDE, SODIUM LACTATE, POTASSIUM CHLORIDE, CALCIUM CHLORIDE 600; 310; 30; 20 MG/100ML; MG/100ML; MG/100ML; MG/100ML
100 INJECTION, SOLUTION INTRAVENOUS ONCE AS NEEDED
Status: DISCONTINUED | OUTPATIENT
Start: 2025-05-30 | End: 2025-05-30 | Stop reason: HOSPADM

## 2025-05-30 RX ORDER — SODIUM CHLORIDE 0.9 % (FLUSH) 0.9 %
10 SYRINGE (ML) INJECTION EVERY 12 HOURS SCHEDULED
OUTPATIENT
Start: 2025-05-30

## 2025-05-30 RX ORDER — SODIUM CHLORIDE 0.9 % (FLUSH) 0.9 %
10 SYRINGE (ML) INJECTION AS NEEDED
Status: DISCONTINUED | OUTPATIENT
Start: 2025-05-30 | End: 2025-05-30 | Stop reason: HOSPADM

## 2025-05-30 RX ORDER — IPRATROPIUM BROMIDE AND ALBUTEROL SULFATE 2.5; .5 MG/3ML; MG/3ML
3 SOLUTION RESPIRATORY (INHALATION) ONCE AS NEEDED
Status: DISCONTINUED | OUTPATIENT
Start: 2025-05-30 | End: 2025-05-30 | Stop reason: HOSPADM

## 2025-05-30 RX ORDER — PROPOFOL 10 MG/ML
VIAL (ML) INTRAVENOUS CONTINUOUS PRN
Status: DISCONTINUED | OUTPATIENT
Start: 2025-05-30 | End: 2025-05-30 | Stop reason: SURG

## 2025-05-30 RX ORDER — FENTANYL CITRATE 50 UG/ML
50 INJECTION, SOLUTION INTRAMUSCULAR; INTRAVENOUS
Status: DISCONTINUED | OUTPATIENT
Start: 2025-05-30 | End: 2025-05-30 | Stop reason: HOSPADM

## 2025-05-30 RX ORDER — POLYETHYLENE GLYCOL 3350 17 G/17G
17 POWDER, FOR SOLUTION ORAL DAILY
Qty: 30 EACH | Refills: 0 | Status: SHIPPED | OUTPATIENT
Start: 2025-05-30

## 2025-05-30 RX ORDER — MEPERIDINE HYDROCHLORIDE 25 MG/ML
12.5 INJECTION INTRAMUSCULAR; INTRAVENOUS; SUBCUTANEOUS
Status: DISCONTINUED | OUTPATIENT
Start: 2025-05-30 | End: 2025-05-30 | Stop reason: HOSPADM

## 2025-05-30 RX ORDER — SODIUM CHLORIDE 0.9 % (FLUSH) 0.9 %
10 SYRINGE (ML) INJECTION AS NEEDED
OUTPATIENT
Start: 2025-05-30

## 2025-05-30 RX ADMIN — PROPOFOL 200 MCG/KG/MIN: 10 INJECTION, EMULSION INTRAVENOUS at 13:54

## 2025-05-30 NOTE — ANESTHESIA POSTPROCEDURE EVALUATION
Patient: Bryson Baker    Procedure Summary       Date: 05/30/25 Room / Location: [facility] OR  /  [facility] OR    Anesthesia Start: 1350 Anesthesia Stop: 1402    Procedures:       COLONOSCOPY with possible biopsy      ESOPHAGOGASTRODUODENOSCOPY with biopsy (Esophagus) Diagnosis:       Left upper quadrant abdominal pain      (Left upper quadrant abdominal pain [R10.12])    Surgeons: Vladislav Burch MD Provider: Jad Rosenthal MD    Anesthesia Type: general ASA Status: 3            Anesthesia Type: general    Vitals  Vitals Value Taken Time   /85 05/30/25 14:32   Temp 97.2 °F (36.2 °C) 05/30/25 14:02   Pulse 52 05/30/25 14:32   Resp 18 05/30/25 14:32   SpO2 95 % 05/30/25 14:32           Post Anesthesia Care and Evaluation    Patient location during evaluation: PACU  Patient participation: complete - patient participated  Level of consciousness: awake  Pain score: 0  Pain management: satisfactory to patient    Airway patency: patent  Anesthetic complications: No anesthetic complications  PONV Status: none  Cardiovascular status: stable  Respiratory status: nasal cannula  Hydration status: acceptable

## 2025-05-30 NOTE — ANESTHESIA PREPROCEDURE EVALUATION
Anesthesia Evaluation     Patient summary reviewed and Nursing notes reviewed   no history of anesthetic complications:   NPO Solid Status: > 8 hours  NPO Liquid Status: > 8 hours           Airway   Mallampati: II  TM distance: >3 FB  Neck ROM: limited  Possible difficult intubation  Dental - normal exam   (+) poor dentition    Pulmonary - normal exam    breath sounds clear to auscultation  (+) a smoker Current, Smoked day of surgery, COPD, asthma,shortness of breath, sleep apnea  Cardiovascular - normal exam  Exercise tolerance: good (4-7 METS)    NYHA Classification: II  ECG reviewed  Patient on routine beta blocker and Beta blocker given within 24 hours of surgery  Rhythm: regular  Rate: normal    (+) hypertension, past MI , CAD, CHF , GAYTAN, PVD, hyperlipidemia    ROS comment: · A pharmacological stress test was performed using regadenoson without low-level exercise.  · Findings consistent with a normal ECG stress test.  · Myocardial perfusion imaging indicates a small-sized, mild degree of ischemia located in the inferior wall.  · Left ventricular ejection fraction is hyperdynamic (Calculated EF > 70%). .  · Impressions are consistent with an low to intermediate risk study.         Neuro/Psych  (+) TIA, psychiatric history Anxiety  GI/Hepatic/Renal/Endo    (+) obesity, morbid obesity, GERD, GI bleeding , liver disease fatty liver disease    Musculoskeletal     Abdominal  - normal exam    Abdomen: soft.   Substance History - negative use     OB/GYN negative ob/gyn ROS         Other   arthritis,     ROS/Med Hx Other: 5/11/22    Normal left ventricular cavity size and wall thickness noted. All left ventricular wall segments contract normally  Left ventricular ejection fraction appears to be 61 - 65%.  Left ventricular diastolic function is consistent with (grade I) impaired relaxation.  There is mild calcification of the aortic valve mainly affecting the non-coronary cusp(s). The aortic valve was poorly visualized  but appears trileaflet.  No aortic valve regurgitation or stenosis is present  The mitral valve is structurally normal with no regurgitation or significant stenosis present.  There is no evidence of pericardial effusion.                  Anesthesia Plan    ASA 3     general     intravenous induction     Anesthetic plan, risks, benefits, and alternatives have been provided, discussed and informed consent has been obtained with: patient.  Pre-procedure education provided  Use of blood products discussed with  Consented to blood products.    Plan discussed with CRNA.      CODE STATUS:
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
## 2025-06-02 LAB — REF LAB TEST METHOD: NORMAL

## 2025-06-20 ENCOUNTER — OFFICE VISIT (OUTPATIENT)
Dept: FAMILY MEDICINE CLINIC | Facility: CLINIC | Age: 75
End: 2025-06-20
Payer: OTHER GOVERNMENT

## 2025-06-20 VITALS
DIASTOLIC BLOOD PRESSURE: 86 MMHG | TEMPERATURE: 98 F | HEART RATE: 61 BPM | WEIGHT: 196.2 LBS | SYSTOLIC BLOOD PRESSURE: 134 MMHG | HEIGHT: 71 IN | OXYGEN SATURATION: 96 % | BODY MASS INDEX: 27.47 KG/M2

## 2025-06-20 DIAGNOSIS — E78.2 MIXED HYPERLIPIDEMIA: Chronic | ICD-10-CM

## 2025-06-20 DIAGNOSIS — Z23 IMMUNIZATION DUE: ICD-10-CM

## 2025-06-20 DIAGNOSIS — K86.1 CHRONIC PANCREATITIS, UNSPECIFIED PANCREATITIS TYPE: Chronic | ICD-10-CM

## 2025-06-20 DIAGNOSIS — I10 ESSENTIAL HYPERTENSION: Primary | Chronic | ICD-10-CM

## 2025-06-20 PROCEDURE — 99214 OFFICE O/P EST MOD 30 MIN: CPT | Performed by: PHYSICIAN ASSISTANT

## 2025-06-20 RX ORDER — ZOSTER VACCINE RECOMBINANT, ADJUVANTED 50 MCG/0.5
0.5 KIT INTRAMUSCULAR ONCE
Qty: 1 EACH | Refills: 1 | Status: SHIPPED | OUTPATIENT
Start: 2025-06-20 | End: 2025-06-20

## 2025-06-20 NOTE — PROGRESS NOTES
"Chief Complaint -hypertension    History of Present Illness -     Bryson Baker is a 74 y.o. male.     Hypertension-  Controlled with amlodipine, losartan and metoprolol along with diet    Chronic pancreatitis-  Stable with conservative measures.  He denies any abdominal pain or symptoms today    4/7/2025 MRI abdomen revealed  1.  Stable prominence of the distal main pancreatic duct with a beaded  appearance which can be seen with sequela of chronic pancreatitis.  Calcifications are also noted. Other considerations would include  intraductal IPMN. Given relative stability since the previous exam,  obstructing ductal mass is felt less likely.  2.  No focal enhancing pancreas parenchymal masses identified.  Relatively delayed enhancement in the region of ductal dilatation distal  pancreas body and tail is most consistent with delayed enhancement in  the setting of chronic pancreatitis indicating presence of fibrosis.  3.  Colonic diverticulosis without diverticulitis.    Hyperlipidemia-  Stable with atorvastatin and low-cholesterol diet    The following portions of the patient's history were reviewed and updated as appropriate: allergies, current medications, past family history, past medical history, past social history, past surgical history and problem list.        Objective  Vital signs:  /86   Pulse 61   Temp 98 °F (36.7 °C) (Temporal)   Ht 180.3 cm (71\")   Wt 89 kg (196 lb 3.2 oz)   SpO2 96%   BMI 27.36 kg/m²     Physical Exam  Vitals and nursing note reviewed.   Constitutional:       Appearance: Normal appearance. He is well-developed.   Eyes:      Extraocular Movements: Extraocular movements intact.      Conjunctiva/sclera: Conjunctivae normal.   Cardiovascular:      Rate and Rhythm: Normal rate and regular rhythm.      Heart sounds: Normal heart sounds. No murmur heard.  Pulmonary:      Effort: Pulmonary effort is normal. No respiratory distress.      Breath sounds: Normal breath sounds. No " wheezing.   Musculoskeletal:         General: No tenderness.   Skin:     General: Skin is warm and dry.      Findings: No rash.   Neurological:      Mental Status: He is alert and oriented to person, place, and time.   Psychiatric:         Mood and Affect: Mood normal.         Behavior: Behavior normal.         Thought Content: Thought content normal.         The following data was reviewed by Rita Orr PA-C:         Lab Results   Component Value Date    BUN 19 01/14/2025    CREATININE 1.10 04/07/2025    EGFR 93.8 01/14/2025    ALT 10 01/14/2025    AST 9 01/14/2025    WBC 7.56 01/14/2025    HGB 14.7 01/14/2025    HCT 44.1 01/14/2025     01/14/2025    CHOL 117 03/18/2024    TRIG 78 03/18/2024    HDL 40 03/18/2024    LDL 61 03/18/2024    TSH 1.610 10/18/2022    HGBA1C 5.40 10/18/2022           Assessment & Plan     Diagnoses and all orders for this visit:    1. Essential hypertension (Primary)  Comments:  Continue amlodipine losartan and metoprolol  Advised a low-sodium diet  Continue to monitor  Orders:  -     Comprehensive Metabolic Panel; Future  -     Hemoglobin A1c; Future    2. Immunization due  Comments:  rx written for shingles vaccine to be administered at the pharmacy  Orders:  -     Zoster Vac Recomb Adjuvanted (Shingrix) 50 MCG/0.5ML reconstituted suspension; Inject 0.5 mL into the appropriate muscle as directed by prescriber 1 (One) Time for 1 dose.  Dispense: 1 each; Refill: 1    3. Chronic pancreatitis, unspecified pancreatitis type  Comments:  Discussed MRI abdomen with patient  Counseled patient to avoid alcohol ingestion  Orders:  -     Amylase; Future  -     Lipase; Future    4. Mixed hyperlipidemia  Comments:  Continue atorvastatin  Advised a low-cholesterol diet  Orders:  -     Comprehensive Metabolic Panel; Future  -     Lipid Panel; Future  -     Hemoglobin A1c; Future                   Patient was given instructions and counseling regarding his condition or for health maintenance  advice. Please see specific information pulled into the AVS if appropriate      This document has been electronically signed by:  Rita Orr PA-C

## 2025-06-20 NOTE — PATIENT INSTRUCTIONS
"Fat and Cholesterol Restricted Eating Plan  Getting too much fat and cholesterol in your diet may cause health problems. Choosing the right foods helps keep your fat and cholesterol at normal levels. This can keep you from getting certain diseases.  Your doctor may recommend an eating plan that includes:  Total fat: ______% or less of total calories a day. This is ______g of fat a day.  Saturated fat: ______% or less of total calories a day. This is ______g of saturated fat a day.  Cholesterol: less than _________mg a day.  Fiber: ______g a day.  What are tips for following this plan?  General tips  Work with your doctor to lose weight if you need to.  Avoid:  Foods with added sugar.  Fried foods.  Foods with trans fat or partially hydrogenated oils. This includes some margarines and baked goods.  If you drink alcohol:  Limit how much you have to:  0-1 drink a day for women who are not pregnant.  0-2 drinks a day for men.  Know how much alcohol is in a drink. In the [place] one drink equals one 12 oz bottle of beer (355 mL), one 5 oz glass of wine (148 mL), or one 1½ oz glass of hard liquor (44 mL).  Reading food labels  Check food labels for:  Trans fats.  Partially hydrogenated oils.  Saturated fat (g) in each serving.  Cholesterol (mg) in each serving.  Fiber (g) in each serving.  Choose foods with healthy fats, such as:  Monounsaturated fats and polyunsaturated fats. These include olive and canola oil, flaxseeds, walnuts, almonds, and seeds.  Omega-3 fats. These are found in certain fish, flaxseed oil, and ground flaxseeds.  Choose grain products that have whole grains. Look for the word \"whole\" as the first word in the ingredient list.  Cooking  Cook foods using low-fat methods. These include baking, boiling, grilling, and broiling.  Eat more home-cooked foods. Eat at restaurants and buffets less often. Eat less fast food.  Avoid cooking using saturated fats, such as butter, cream, palm oil, palm kernel oil, and " coconut oil.  Meal planning    At meals, divide your plate into four equal parts:  Fill one-half of your plate with vegetables, green salads, and fruit.  Fill one-fourth of your plate with whole grains.  Fill one-fourth of your plate with low-fat (lean) protein foods.  Eat fish that is high in omega-3 fats at least two times a week. This includes mackerel, tuna, sardines, and salmon.  Eat foods that are high in fiber, such as whole grains, beans, apples, pears, berries, broccoli, carrots, peas, and barley.  What foods should I eat?  Fruits  All fresh, canned (in natural juice), or frozen fruits.  Vegetables  Fresh or frozen vegetables (raw, steamed, roasted, or grilled). Green salads.  Grains  Whole grains, such as whole wheat or whole grain breads, crackers, cereals, and pasta. Unsweetened oatmeal, bulgur, barley, quinoa, or brown rice. Corn or whole wheat flour tortillas.  Meats and other protein foods  Ground beef (85% or leaner), grass-fed beef, or beef trimmed of fat. Skinless chicken or turkey. Ground chicken or turkey. Pork trimmed of fat. All fish and seafood. Egg whites. Dried beans, peas, or lentils. Unsalted nuts or seeds. Unsalted canned beans. Nut butters without added sugar or oil.  Dairy  Low-fat or nonfat dairy products, such as skim or 1% milk, 2% or reduced-fat cheeses, low-fat and fat-free ricotta or cottage cheese, or plain low-fat and nonfat yogurt.  Fats and oils  Tub margarine without trans fats. Light or reduced-fat mayonnaise and salad dressings. Avocado. Olive, canola, sesame, or safflower oils.  The items listed above may not be a complete list of foods and beverages you can eat. Contact a dietitian for more information.  What foods should I avoid?  Fruits  Canned fruit in heavy syrup. Fruit in cream or butter sauce. Fried fruit.  Vegetables  Vegetables cooked in cheese, cream, or butter sauce. Fried vegetables.  Grains  White bread. White pasta. White rice. Cornbread. Bagels, pastries,  and croissants. Crackers and snack foods that contain trans fat and hydrogenated oils.  Meats and other protein foods  Fatty cuts of meat. Ribs, chicken wings, griffin, sausage, bologna, salami, chitterlings, fatback, hot dogs, bratwurst, and packaged lunch meats. Liver and organ meats. Whole eggs and egg yolks. Chicken and turkey with skin. Fried meat.  Dairy  Whole or 2% milk, cream, half-and-half, and cream cheese. Whole milk cheeses. Whole-fat or sweetened yogurt. Full-fat cheeses. Nondairy creamers and whipped toppings. Processed cheese, cheese spreads, and cheese curds.  Fats and oils  Butter, stick margarine, lard, shortening, ghee, or griffin fat. Coconut, palm kernel, and palm oils.  Beverages  Alcohol. Sugar-sweetened drinks such as sodas, lemonade, and fruit drinks.  Sweets and desserts  Corn syrup, sugars, honey, and molasses. Candy. Jam and jelly. Syrup. Sweetened cereals. Cookies, pies, cakes, donuts, muffins, and ice cream.  The items listed above may not be a complete list of foods and beverages you should avoid. Contact a dietitian for more information.  Summary  Choosing the right foods helps keep your fat and cholesterol at normal levels. This can keep you from getting certain diseases.  At meals, fill one-half of your plate with vegetables, green salads, and fruits.  Eat high fiber foods, like whole grains, beans, apples, pears, berries, carrots, peas, and barley.  Limit added sugar, saturated fats, alcohol, and fried foods.  This information is not intended to replace advice given to you by your health care provider. Make sure you discuss any questions you have with your health care provider.  Document Revised: 04/29/2022 Document Reviewed: 04/29/2022  Elsevier Patient Education © 2024 [employer]

## 2025-07-08 ENCOUNTER — TELEPHONE (OUTPATIENT)
Dept: SURGERY | Facility: CLINIC | Age: 75
End: 2025-07-08
Payer: OTHER GOVERNMENT

## 2025-07-08 NOTE — TELEPHONE ENCOUNTER
You are scheduled for a colonoscopy with Dr. Vladsilav Burch on July 11 2025 at 6:30am.   Dr. Burch's colonoscopy prep is a 2 day regimen.    Day 1: Clear liquid diet (items below) all day from the time you wake up until midnight. Between 1-3 pm Take 4 Dulcolax tabs with 8 oz of liquid.     Soft Drink Mt. Dew, Sprite, Ginger-Breana (Yellow to clear in color)   Broth Beef or Chicken   Jell-O No ORANGE, RED or PURPLE    Gatorade No ORANGE,RED or PURPLE    Popsicles No ORANGE, RED or PURPLE    Coffee/Tea Black ONLY (no cream or sugar for tea or coffee)   Water Tap or Bottled   Juice Apple or White Grape       Day 2:  Clear liquid diet (items below) all day from the time you wake up until midnight. Between 1-3 pm Take 4 Dulcolax tabs with 8 oz of liquid.   At 4 pm Mix 32 oz of Gatorade Zero (No RED,ORANGE,PURPLE) with a 238 gram bottle of Miralax (store brand is fine). Once thoroughly mixed, drink entire contents within 2 hours.  Follow up prep by drinking 32 oz of plain water.     Soft Drink Mt. Dew, Sprite, Ginger-Breana (Yellow to clear in color)   Broth Beef or Chicken   Jell-O No ORANGE, RED or PURPLE    Gatorade No ORANGE,RED or PURPLE    Popsicles No ORANGE, RED or PURPLE    Coffee/Tea Black ONLY (no cream or sugar for tea or coffee)   Water Tap or Bottled   Juice Apple or White Grape        All patients are to be NPO (no food or drink) after midnight the night before surgery.     All patients must have a  accompany them on the day of surgery. That person is REQUIRED to stay here on the hospital campus during your surgery! They cannot drop you off and come back to pick you up!

## 2025-07-30 ENCOUNTER — TELEPHONE (OUTPATIENT)
Dept: CARDIOLOGY | Facility: CLINIC | Age: 75
End: 2025-07-30
Payer: OTHER GOVERNMENT

## 2025-07-30 ENCOUNTER — OFFICE VISIT (OUTPATIENT)
Dept: CARDIOLOGY | Facility: CLINIC | Age: 75
End: 2025-07-30
Payer: OTHER GOVERNMENT

## 2025-07-30 VITALS
WEIGHT: 199.4 LBS | HEIGHT: 71 IN | OXYGEN SATURATION: 94 % | BODY MASS INDEX: 27.92 KG/M2 | HEART RATE: 64 BPM | DIASTOLIC BLOOD PRESSURE: 68 MMHG | SYSTOLIC BLOOD PRESSURE: 130 MMHG

## 2025-07-30 DIAGNOSIS — I10 ESSENTIAL HYPERTENSION: ICD-10-CM

## 2025-07-30 DIAGNOSIS — E78.2 MIXED HYPERLIPIDEMIA: ICD-10-CM

## 2025-07-30 DIAGNOSIS — I25.10 ASCVD (ARTERIOSCLEROTIC CARDIOVASCULAR DISEASE): Primary | ICD-10-CM

## 2025-07-30 PROCEDURE — 99214 OFFICE O/P EST MOD 30 MIN: CPT | Performed by: PHYSICIAN ASSISTANT

## 2025-07-30 RX ORDER — BUPRENORPHINE HYDROCHLORIDE AND NALOXONE HYDROCHLORIDE DIHYDRATE 8; 2 MG/1; MG/1
1 TABLET SUBLINGUAL DAILY
COMMUNITY

## 2025-07-30 NOTE — TELEPHONE ENCOUNTER
PCP is [facility] in [place]. Most recent labs are already in his chart.     ----- Message from Danny Oneal sent at 7/30/2025  2:28 PM EDT -----  CAN WE GET LABS FROM PCP

## 2025-07-30 NOTE — TELEPHONE ENCOUNTER
Called pt to see which clinic he went to. He stated he has a copy of his labs that he will bring by his PCP office to scan into his chart.

## (undated) DEVICE — THE BITE BLOCK MAXI, LATEX FREE STRAP IS USED TO PROTECT THE ENDOSCOPE INSERTION TUBE FROM BEING BITTEN BY THE PATIENT.

## (undated) DEVICE — CONN Y IRR DISP 1P/U

## (undated) DEVICE — FRCP BX RADJAW4 NDL 2.8 240CM LG OG BX40

## (undated) DEVICE — ENDOGATOR AUXILIARY WATER JET CONNECTOR: Brand: ENDOGATOR

## (undated) DEVICE — Device: Brand: DEFENDO AIR/WATER/SUCTION AND BIOPSY VALVE

## (undated) DEVICE — SYR LUERLOK 30CC

## (undated) DEVICE — SINGLE PORT MANIFOLD: Brand: NEPTUNE 2

## (undated) DEVICE — Device

## (undated) DEVICE — TUBING, SUCTION, 1/4" X 20', STRAIGHT: Brand: MEDLINE INDUSTRIES, INC.

## (undated) DEVICE — ENDOGATOR TUBING FOR ENDOGATOR EGP-100 IRRIGATION PUMP,OLYMPUS OFP PUMP, OLYMPUS AFU-100 PUMP AND ERBE EIP2 PUMP: Brand: ENDOGATOR

## (undated) DEVICE — DEFENDO AIR WATER SUCTION AND BIOPSY VALVE KIT FOR  OLYMPUS: Brand: DEFENDO AIR/WATER/SUCTION AND BIOPSY VALVE